# Patient Record
Sex: FEMALE | Race: WHITE | NOT HISPANIC OR LATINO
[De-identification: names, ages, dates, MRNs, and addresses within clinical notes are randomized per-mention and may not be internally consistent; named-entity substitution may affect disease eponyms.]

---

## 2018-08-16 ENCOUNTER — RESULT REVIEW (OUTPATIENT)
Age: 73
End: 2018-08-16

## 2018-08-29 ENCOUNTER — EMERGENCY (EMERGENCY)
Facility: HOSPITAL | Age: 73
LOS: 1 days | Discharge: HOME | End: 2018-08-29
Attending: EMERGENCY MEDICINE | Admitting: EMERGENCY MEDICINE
Payer: MEDICARE

## 2018-08-29 VITALS
TEMPERATURE: 98 F | SYSTOLIC BLOOD PRESSURE: 122 MMHG | RESPIRATION RATE: 18 BRPM | HEART RATE: 80 BPM | DIASTOLIC BLOOD PRESSURE: 62 MMHG | OXYGEN SATURATION: 97 %

## 2018-08-29 VITALS
WEIGHT: 149.91 LBS | HEART RATE: 97 BPM | SYSTOLIC BLOOD PRESSURE: 151 MMHG | RESPIRATION RATE: 20 BRPM | OXYGEN SATURATION: 98 % | HEIGHT: 63 IN | TEMPERATURE: 99 F | DIASTOLIC BLOOD PRESSURE: 87 MMHG

## 2018-08-29 DIAGNOSIS — F20.9 SCHIZOPHRENIA, UNSPECIFIED: ICD-10-CM

## 2018-08-29 LAB
ANION GAP SERPL CALC-SCNC: 10 MMOL/L — SIGNIFICANT CHANGE UP (ref 5–17)
APAP SERPL-MCNC: <1 UG/ML — LOW (ref 10–30)
BASOPHILS # BLD AUTO: 0.07 K/UL — SIGNIFICANT CHANGE UP (ref 0–0.2)
BASOPHILS NFR BLD AUTO: 0.7 % — SIGNIFICANT CHANGE UP (ref 0–2)
BUN SERPL-MCNC: 21 MG/DL — SIGNIFICANT CHANGE UP (ref 7–23)
CALCIUM SERPL-MCNC: 9 MG/DL — SIGNIFICANT CHANGE UP (ref 8.4–10.5)
CHLORIDE SERPL-SCNC: 104 MMOL/L — SIGNIFICANT CHANGE UP (ref 96–108)
CO2 SERPL-SCNC: 27 MMOL/L — SIGNIFICANT CHANGE UP (ref 22–31)
CREAT SERPL-MCNC: 0.99 MG/DL — SIGNIFICANT CHANGE UP (ref 0.5–1.3)
EOSINOPHIL # BLD AUTO: 0.04 K/UL — SIGNIFICANT CHANGE UP (ref 0–0.5)
EOSINOPHIL NFR BLD AUTO: 0.4 % — SIGNIFICANT CHANGE UP (ref 0–6)
ETHANOL SERPL-MCNC: <3 MG/DL — SIGNIFICANT CHANGE UP (ref 0–3)
GLUCOSE SERPL-MCNC: 104 MG/DL — HIGH (ref 70–99)
HCT VFR BLD CALC: 35.2 % — SIGNIFICANT CHANGE UP (ref 34.5–45)
HGB BLD-MCNC: 11.6 G/DL — SIGNIFICANT CHANGE UP (ref 11.5–15.5)
IMM GRANULOCYTES NFR BLD AUTO: 0.3 % — SIGNIFICANT CHANGE UP (ref 0–1.5)
LYMPHOCYTES # BLD AUTO: 3.06 K/UL — SIGNIFICANT CHANGE UP (ref 1–3.3)
LYMPHOCYTES # BLD AUTO: 31.8 % — SIGNIFICANT CHANGE UP (ref 13–44)
MCHC RBC-ENTMCNC: 30.4 PG — SIGNIFICANT CHANGE UP (ref 27–34)
MCHC RBC-ENTMCNC: 33 GM/DL — SIGNIFICANT CHANGE UP (ref 32–36)
MCV RBC AUTO: 92.4 FL — SIGNIFICANT CHANGE UP (ref 80–100)
MONOCYTES # BLD AUTO: 0.65 K/UL — SIGNIFICANT CHANGE UP (ref 0–0.9)
MONOCYTES NFR BLD AUTO: 6.8 % — SIGNIFICANT CHANGE UP (ref 2–14)
NEUTROPHILS # BLD AUTO: 5.77 K/UL — SIGNIFICANT CHANGE UP (ref 1.8–7.4)
NEUTROPHILS NFR BLD AUTO: 60 % — SIGNIFICANT CHANGE UP (ref 43–77)
PCP SPEC-MCNC: SIGNIFICANT CHANGE UP
PLATELET # BLD AUTO: 353 K/UL — SIGNIFICANT CHANGE UP (ref 150–400)
POTASSIUM SERPL-MCNC: 3.7 MMOL/L — SIGNIFICANT CHANGE UP (ref 3.5–5.3)
POTASSIUM SERPL-SCNC: 3.7 MMOL/L — SIGNIFICANT CHANGE UP (ref 3.5–5.3)
RBC # BLD: 3.81 M/UL — SIGNIFICANT CHANGE UP (ref 3.8–5.2)
RBC # FLD: 12.4 % — SIGNIFICANT CHANGE UP (ref 10.3–14.5)
SALICYLATES SERPL-MCNC: 1.2 MG/DL — LOW (ref 2.8–20)
SODIUM SERPL-SCNC: 141 MMOL/L — SIGNIFICANT CHANGE UP (ref 135–145)
WBC # BLD: 9.62 K/UL — SIGNIFICANT CHANGE UP (ref 3.8–10.5)
WBC # FLD AUTO: 9.62 K/UL — SIGNIFICANT CHANGE UP (ref 3.8–10.5)

## 2018-08-29 PROCEDURE — 99285 EMERGENCY DEPT VISIT HI MDM: CPT | Mod: 25

## 2018-08-29 PROCEDURE — 85027 COMPLETE CBC AUTOMATED: CPT

## 2018-08-29 PROCEDURE — 99285 EMERGENCY DEPT VISIT HI MDM: CPT

## 2018-08-29 PROCEDURE — 71045 X-RAY EXAM CHEST 1 VIEW: CPT | Mod: 26

## 2018-08-29 PROCEDURE — 36415 COLL VENOUS BLD VENIPUNCTURE: CPT

## 2018-08-29 PROCEDURE — 71045 X-RAY EXAM CHEST 1 VIEW: CPT

## 2018-08-29 PROCEDURE — 80307 DRUG TEST PRSMV CHEM ANLYZR: CPT

## 2018-08-29 PROCEDURE — 90792 PSYCH DIAG EVAL W/MED SRVCS: CPT | Mod: GT

## 2018-08-29 PROCEDURE — 80048 BASIC METABOLIC PNL TOTAL CA: CPT

## 2018-08-29 RX ADMIN — Medication 1 MILLIGRAM(S): at 19:45

## 2018-08-29 NOTE — ED PROVIDER NOTE - OBJECTIVE STATEMENT
72yo female who presents with denia for the last 2 weeks. pt was taken off her klonipin because her psychiatrist took her off her meds because he wanted her to sleep and over the last few days pt has been manic, unable to sleep no suicidal or homocidal thoughts, pt has no hallucinations
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
## 2018-08-29 NOTE — ED ADULT NURSE NOTE - OBJECTIVE STATEMENT
Patient brought in by family for increasing manic mood. Klonopin was discontinued 2 weeks ago by kenyon Kwok and as of about 2-3 days sister reports incheased agitation. Patient pacing in room. Cooperative

## 2018-08-29 NOTE — ED BEHAVIORAL HEALTH ASSESSMENT NOTE - DESCRIPTION
calm, cooperative recent medical admission for PNA at another facility living with sister and brother in law, states she can't afford to live alone. living there x 1 month since she just had a breakup of a relationship.  x 10 years. As per primary ED nurse, patient initially presented to ED with some restlessness and rapid speech but was able to interact appropriately with staff and family at bedside. Patient was not agitated or violent but made complaints of increased anxiety for past 2 days. Patient responded well to Ativan 1 mg po and cooperated with all labs and vitals. Patient appeared well dressed and groomed. living with sister and brother in law, states she can't afford to live alone. living there x 1 month since she just had a breakup of a relationship.  x 10 years. has three children.

## 2018-08-29 NOTE — ED ADULT NURSE NOTE - NSIMPLEMENTINTERV_GEN_ALL_ED
Implemented All Fall with Harm Risk Interventions:  Smith to call system. Call bell, personal items and telephone within reach. Instruct patient to call for assistance. Room bathroom lighting operational. Non-slip footwear when patient is off stretcher. Physically safe environment: no spills, clutter or unnecessary equipment. Stretcher in lowest position, wheels locked, appropriate side rails in place. Provide visual cue, wrist band, yellow gown, etc. Monitor gait and stability. Monitor for mental status changes and reorient to person, place, and time. Review medications for side effects contributing to fall risk. Reinforce activity limits and safety measures with patient and family. Provide visual clues: red socks.

## 2018-08-29 NOTE — ED BEHAVIORAL HEALTH ASSESSMENT NOTE - DETAILS
patient cites "allergy" but is not able to provide offending agent or nature of adverse reaction sister "manic depressive" family, psychiatrist

## 2018-08-29 NOTE — ED ADULT NURSE NOTE - HPI (INCLUDE ILLNESS QUALITY, SEVERITY, DURATION, TIMING, CONTEXT, MODIFYING FACTORS, ASSOCIATED SIGNS AND SYMPTOMS)
History of   Schizophrenia since young with multiple inpatient admissions over the years. Patient denies suicidal thoughts or homicidal ideations. No plan to hurt self or others. Daughter and sister both report patient has never verbalized suicidal thoughts or plan. No history of homicidal thoughts.

## 2018-08-29 NOTE — ED ADULT TRIAGE NOTE - CHIEF COMPLAINT QUOTE
" She had Pneumonia 2 weeks ago, ( July 17 ) She is very manic, talks a lot " Pt hx of Paranoia schizophrenia

## 2018-08-29 NOTE — ED ADULT NURSE REASSESSMENT NOTE - NS ED NURSE REASSESS COMMENT FT1
Patient no longer manic. Had interview with Tele psych. Calm, speech well paced and clear. 1:1 in place.

## 2018-08-29 NOTE — ED BEHAVIORAL HEALTH ASSESSMENT NOTE - OTHER PAST PSYCHIATRIC HISTORY (INCLUDE DETAILS REGARDING ONSET, COURSE OF ILLNESS, INPATIENT/OUTPATIENT TREATMENT)
has been hospitalized multiple times, none x 10 years. denies hx of suicide attempts. currently in outpatient tx.

## 2018-08-29 NOTE — ED BEHAVIORAL HEALTH ASSESSMENT NOTE - SUMMARY
Patient is a 72 y/o F with reported hx of schizophrenia  with prior hospitalizations, no prior suicide attempts, no known chronic medical issues, who was admitted to medicine last month for mgmt of PNA, brought in by her family because of concern over rapid speech.     Patient presents with normal speech volume, rate, and rhythm (though of note she has received one dose of bzd in ER). She denies symptoms of denia and of depression. Collateral from sister reveals main concern is rapid speech all day, but that patient is sleeping at least 10 hours per night, and has no history of dangerousness. Also patient and sister report that patient adheres to medications and to appointments and can follow up with Dr. Kwok (no currently scheduled appt but plan to call tomorrow, MD also called and left msg for Dr. Kwok).

## 2018-08-29 NOTE — ED PROVIDER NOTE - PROGRESS NOTE DETAILS
spoke with dr tate, pt is cleared to be d/c home follow up with outptpsych, return if any symtposm worsen

## 2018-09-28 PROBLEM — F32.9 MAJOR DEPRESSIVE DISORDER, SINGLE EPISODE, UNSPECIFIED: Chronic | Status: ACTIVE | Noted: 2018-08-29

## 2018-09-28 PROBLEM — F25.0 SCHIZOAFFECTIVE DISORDER, BIPOLAR TYPE: Chronic | Status: ACTIVE | Noted: 2018-08-29

## 2019-08-20 ENCOUNTER — APPOINTMENT (OUTPATIENT)
Dept: OBGYN | Facility: CLINIC | Age: 74
End: 2019-08-20
Payer: MEDICARE

## 2019-08-20 ENCOUNTER — RECORD ABSTRACTING (OUTPATIENT)
Age: 74
End: 2019-08-20

## 2019-08-20 VITALS
DIASTOLIC BLOOD PRESSURE: 70 MMHG | BODY MASS INDEX: 25.21 KG/M2 | WEIGHT: 137 LBS | SYSTOLIC BLOOD PRESSURE: 120 MMHG | HEIGHT: 62 IN

## 2019-08-20 DIAGNOSIS — Z83.3 FAMILY HISTORY OF DIABETES MELLITUS: ICD-10-CM

## 2019-08-20 DIAGNOSIS — Z12.39 ENCOUNTER FOR OTHER SCREENING FOR MALIGNANT NEOPLASM OF BREAST: ICD-10-CM

## 2019-08-20 DIAGNOSIS — Z63.4 DISAPPEARANCE AND DEATH OF FAMILY MEMBER: ICD-10-CM

## 2019-08-20 DIAGNOSIS — Z82.49 FAMILY HISTORY OF ISCHEMIC HEART DISEASE AND OTHER DISEASES OF THE CIRCULATORY SYSTEM: ICD-10-CM

## 2019-08-20 DIAGNOSIS — R35.0 FREQUENCY OF MICTURITION: ICD-10-CM

## 2019-08-20 DIAGNOSIS — Z12.11 ENCOUNTER FOR SCREENING FOR MALIGNANT NEOPLASM OF COLON: ICD-10-CM

## 2019-08-20 DIAGNOSIS — Z01.419 ENCOUNTER FOR GYNECOLOGICAL EXAMINATION (GENERAL) (ROUTINE) W/OUT ABNORMAL FINDINGS: ICD-10-CM

## 2019-08-20 DIAGNOSIS — Z72.3 LACK OF PHYSICAL EXERCISE: ICD-10-CM

## 2019-08-20 DIAGNOSIS — F20.9 SCHIZOPHRENIA, UNSPECIFIED: ICD-10-CM

## 2019-08-20 DIAGNOSIS — E78.5 HYPERLIPIDEMIA, UNSPECIFIED: ICD-10-CM

## 2019-08-20 DIAGNOSIS — F17.200 NICOTINE DEPENDENCE, UNSPECIFIED, UNCOMPLICATED: ICD-10-CM

## 2019-08-20 PROBLEM — Z00.00 ENCOUNTER FOR PREVENTIVE HEALTH EXAMINATION: Status: ACTIVE | Noted: 2019-08-20

## 2019-08-20 LAB
CYTOLOGY CVX/VAG DOC THIN PREP: NORMAL
DATE COLLECTED: NORMAL
HEMOCCULT SP1 STL QL: NEGATIVE
QUALITY CONTROL: YES

## 2019-08-20 PROCEDURE — 99397 PER PM REEVAL EST PAT 65+ YR: CPT | Mod: GY

## 2019-08-20 PROCEDURE — 82270 OCCULT BLOOD FECES: CPT

## 2019-08-20 PROCEDURE — G0101: CPT

## 2019-08-20 RX ORDER — HYDROXYZINE HYDROCHLORIDE 10 MG/5ML
10 SOLUTION ORAL
Refills: 0 | Status: DISCONTINUED | COMMUNITY
End: 2019-08-20

## 2019-08-20 RX ORDER — TRAZODONE HYDROCHLORIDE 150 MG/1
150 TABLET ORAL
Refills: 0 | Status: ACTIVE | COMMUNITY

## 2019-08-20 RX ORDER — PALIPERIDONE 9 MG/1
9 TABLET, EXTENDED RELEASE ORAL
Refills: 0 | Status: ACTIVE | COMMUNITY

## 2019-08-20 RX ORDER — DONEPEZIL HYDROCHLORIDE 10 MG/1
10 TABLET ORAL
Refills: 0 | Status: ACTIVE | COMMUNITY

## 2019-08-20 RX ORDER — CLONAZEPAM 2 MG/1
TABLET ORAL
Refills: 0 | Status: ACTIVE | COMMUNITY

## 2019-08-20 SDOH — SOCIAL STABILITY - SOCIAL INSECURITY: DISSAPEARANCE AND DEATH OF FAMILY MEMBER: Z63.4

## 2019-08-20 NOTE — PHYSICAL EXAM
[Awake] : awake [Alert] : alert [Examination Of The Breasts] : a normal appearance [No Discharge] : no discharge [No Masses] : no breast masses were palpable [Soft] : soft [Oriented x3] : oriented to person, place, and time [Labia Majora] : labia major [Labia Minora] : labia minora [Normal] : clitoris [Atrophy] : atrophy [Dry Mucosa] : dry mucosa [No Bleeding] : there was no active vaginal bleeding [Uterine Adnexae] : were not tender and not enlarged [No Tenderness] : no rectal tenderness [Nl Sphincter Tone] : normal sphincter tone [Acute Distress] : no acute distress [Mass] : no breast mass [Nipple Discharge] : no nipple discharge [Axillary LAD] : no axillary lymphadenopathy [Tender] : non tender [Depressed Mood] : not depressed [Flat Affect] : affect not flat [Occult Blood] : occult blood test from digital rectal exam was negative

## 2019-08-20 NOTE — END OF VISIT
[FreeTextEntry3] : I, Alisha Quinn, acted solely as a scribe for Dr. Sweeney on this date [8/20/19].\par \par All medical record entries made by the Scribe were at my, Dr. Sweeney's direction and personally dictated by me on [8/20/19]. I have reviewed the chart and agree that the record accurately reflects my personal performance of the history, physical exam, assessment and plan. I have also personally directed, reviewed, and agreed with the chart.\par

## 2019-08-20 NOTE — REVIEW OF SYSTEMS
[Chills] : chills [Sleep Disturbances] : sleep disturbances [Anxiety] : anxiety [Depression] : depression [Nl] : Integumentary

## 2019-08-20 NOTE — HISTORY OF PRESENT ILLNESS
[1 Year Ago] : 1 year ago [Good] : being in good health [Healthy Diet] : a healthy diet [Regular Exercise] : regular exercise [Last Bone Density ___] : Last bone density studies [unfilled] [Last Mammogram ___] : Last Mammogram was [unfilled] [Last Pap ___] : Last cervical pap smear was [unfilled] [unknown] : the patient is unsure of the date of her LMP [Menarche Age: ____] : age at menarche was [unfilled] [Postmenopausal] : is postmenopausal [FreeTextEntry8] : Vaginal Dryness [Sexually Active] : is not sexually active

## 2019-12-17 ENCOUNTER — TRANSCRIPTION ENCOUNTER (OUTPATIENT)
Age: 74
End: 2019-12-17

## 2019-12-18 ENCOUNTER — OUTPATIENT (OUTPATIENT)
Dept: OUTPATIENT SERVICES | Facility: HOSPITAL | Age: 74
LOS: 1 days | End: 2019-12-18
Payer: MEDICARE

## 2019-12-18 ENCOUNTER — RESULT REVIEW (OUTPATIENT)
Age: 74
End: 2019-12-18

## 2019-12-18 DIAGNOSIS — Z12.11 ENCOUNTER FOR SCREENING FOR MALIGNANT NEOPLASM OF COLON: ICD-10-CM

## 2019-12-18 DIAGNOSIS — D64.9 ANEMIA, UNSPECIFIED: ICD-10-CM

## 2019-12-18 PROCEDURE — G0121: CPT

## 2019-12-18 PROCEDURE — 88305 TISSUE EXAM BY PATHOLOGIST: CPT

## 2019-12-18 PROCEDURE — 88313 SPECIAL STAINS GROUP 2: CPT

## 2019-12-18 PROCEDURE — 88313 SPECIAL STAINS GROUP 2: CPT | Mod: 26

## 2019-12-18 PROCEDURE — 88312 SPECIAL STAINS GROUP 1: CPT

## 2019-12-18 PROCEDURE — 43239 EGD BIOPSY SINGLE/MULTIPLE: CPT

## 2019-12-18 PROCEDURE — 88312 SPECIAL STAINS GROUP 1: CPT | Mod: 26

## 2019-12-18 PROCEDURE — 88305 TISSUE EXAM BY PATHOLOGIST: CPT | Mod: 26

## 2019-12-20 LAB — SURGICAL PATHOLOGY STUDY: SIGNIFICANT CHANGE UP

## 2020-02-27 ENCOUNTER — EMERGENCY (EMERGENCY)
Facility: HOSPITAL | Age: 75
LOS: 1 days | Discharge: HOME | End: 2020-02-27
Attending: EMERGENCY MEDICINE | Admitting: EMERGENCY MEDICINE
Payer: MEDICARE

## 2020-02-27 VITALS
OXYGEN SATURATION: 99 % | SYSTOLIC BLOOD PRESSURE: 126 MMHG | WEIGHT: 130.07 LBS | RESPIRATION RATE: 18 BRPM | TEMPERATURE: 98 F | HEART RATE: 78 BPM | DIASTOLIC BLOOD PRESSURE: 72 MMHG | HEIGHT: 63 IN

## 2020-02-27 VITALS
TEMPERATURE: 98 F | DIASTOLIC BLOOD PRESSURE: 80 MMHG | OXYGEN SATURATION: 97 % | HEART RATE: 82 BPM | RESPIRATION RATE: 16 BRPM | SYSTOLIC BLOOD PRESSURE: 146 MMHG

## 2020-02-27 DIAGNOSIS — F25.0 SCHIZOAFFECTIVE DISORDER, BIPOLAR TYPE: ICD-10-CM

## 2020-02-27 LAB
ALBUMIN SERPL ELPH-MCNC: 3.6 G/DL — SIGNIFICANT CHANGE UP (ref 3.3–5)
ALP SERPL-CCNC: 85 U/L — SIGNIFICANT CHANGE UP (ref 30–120)
ALT FLD-CCNC: 24 U/L DA — SIGNIFICANT CHANGE UP (ref 10–60)
AMPHET UR-MCNC: NEGATIVE — SIGNIFICANT CHANGE UP
ANION GAP SERPL CALC-SCNC: 9 MMOL/L — SIGNIFICANT CHANGE UP (ref 5–17)
APAP SERPL-MCNC: <1 UG/ML — LOW (ref 10–30)
APPEARANCE UR: CLEAR — SIGNIFICANT CHANGE UP
AST SERPL-CCNC: 20 U/L — SIGNIFICANT CHANGE UP (ref 10–40)
BARBITURATES UR SCN-MCNC: NEGATIVE — SIGNIFICANT CHANGE UP
BASOPHILS # BLD AUTO: 0.04 K/UL — SIGNIFICANT CHANGE UP (ref 0–0.2)
BASOPHILS NFR BLD AUTO: 0.5 % — SIGNIFICANT CHANGE UP (ref 0–2)
BENZODIAZ UR-MCNC: NEGATIVE — SIGNIFICANT CHANGE UP
BILIRUB SERPL-MCNC: 0.2 MG/DL — SIGNIFICANT CHANGE UP (ref 0.2–1.2)
BILIRUB UR-MCNC: NEGATIVE — SIGNIFICANT CHANGE UP
BUN SERPL-MCNC: 18 MG/DL — SIGNIFICANT CHANGE UP (ref 7–23)
CALCIUM SERPL-MCNC: 9 MG/DL — SIGNIFICANT CHANGE UP (ref 8.4–10.5)
CHLORIDE SERPL-SCNC: 106 MMOL/L — SIGNIFICANT CHANGE UP (ref 96–108)
CO2 SERPL-SCNC: 28 MMOL/L — SIGNIFICANT CHANGE UP (ref 22–31)
COCAINE METAB.OTHER UR-MCNC: NEGATIVE — SIGNIFICANT CHANGE UP
COLOR SPEC: YELLOW — SIGNIFICANT CHANGE UP
CREAT SERPL-MCNC: 1.01 MG/DL — SIGNIFICANT CHANGE UP (ref 0.5–1.3)
DIFF PNL FLD: NEGATIVE — SIGNIFICANT CHANGE UP
EOSINOPHIL # BLD AUTO: 0.07 K/UL — SIGNIFICANT CHANGE UP (ref 0–0.5)
EOSINOPHIL NFR BLD AUTO: 0.9 % — SIGNIFICANT CHANGE UP (ref 0–6)
ETHANOL SERPL-MCNC: <3 MG/DL — SIGNIFICANT CHANGE UP (ref 0–3)
GLUCOSE SERPL-MCNC: 93 MG/DL — SIGNIFICANT CHANGE UP (ref 70–99)
GLUCOSE UR QL: NEGATIVE MG/DL — SIGNIFICANT CHANGE UP
HCT VFR BLD CALC: 34.1 % — LOW (ref 34.5–45)
HGB BLD-MCNC: 11.1 G/DL — LOW (ref 11.5–15.5)
IMM GRANULOCYTES NFR BLD AUTO: 0.4 % — SIGNIFICANT CHANGE UP (ref 0–1.5)
KETONES UR-MCNC: NEGATIVE — SIGNIFICANT CHANGE UP
LEUKOCYTE ESTERASE UR-ACNC: ABNORMAL
LYMPHOCYTES # BLD AUTO: 2.1 K/UL — SIGNIFICANT CHANGE UP (ref 1–3.3)
LYMPHOCYTES # BLD AUTO: 26.7 % — SIGNIFICANT CHANGE UP (ref 13–44)
MCHC RBC-ENTMCNC: 30.7 PG — SIGNIFICANT CHANGE UP (ref 27–34)
MCHC RBC-ENTMCNC: 32.6 GM/DL — SIGNIFICANT CHANGE UP (ref 32–36)
MCV RBC AUTO: 94.5 FL — SIGNIFICANT CHANGE UP (ref 80–100)
METHADONE UR-MCNC: NEGATIVE — SIGNIFICANT CHANGE UP
MONOCYTES # BLD AUTO: 0.57 K/UL — SIGNIFICANT CHANGE UP (ref 0–0.9)
MONOCYTES NFR BLD AUTO: 7.3 % — SIGNIFICANT CHANGE UP (ref 2–14)
NEUTROPHILS # BLD AUTO: 5.05 K/UL — SIGNIFICANT CHANGE UP (ref 1.8–7.4)
NEUTROPHILS NFR BLD AUTO: 64.2 % — SIGNIFICANT CHANGE UP (ref 43–77)
NITRITE UR-MCNC: NEGATIVE — SIGNIFICANT CHANGE UP
NRBC # BLD: 0 /100 WBCS — SIGNIFICANT CHANGE UP (ref 0–0)
OPIATES UR-MCNC: NEGATIVE — SIGNIFICANT CHANGE UP
PCP SPEC-MCNC: SIGNIFICANT CHANGE UP
PCP UR-MCNC: NEGATIVE — SIGNIFICANT CHANGE UP
PH UR: 8 — SIGNIFICANT CHANGE UP (ref 5–8)
PLATELET # BLD AUTO: 283 K/UL — SIGNIFICANT CHANGE UP (ref 150–400)
POTASSIUM SERPL-MCNC: 4.1 MMOL/L — SIGNIFICANT CHANGE UP (ref 3.5–5.3)
POTASSIUM SERPL-SCNC: 4.1 MMOL/L — SIGNIFICANT CHANGE UP (ref 3.5–5.3)
PROT SERPL-MCNC: 6.9 G/DL — SIGNIFICANT CHANGE UP (ref 6–8.3)
PROT UR-MCNC: NEGATIVE MG/DL — SIGNIFICANT CHANGE UP
RBC # BLD: 3.61 M/UL — LOW (ref 3.8–5.2)
RBC # FLD: 12.3 % — SIGNIFICANT CHANGE UP (ref 10.3–14.5)
SALICYLATES SERPL-MCNC: 0.7 MG/DL — LOW (ref 2.8–20)
SODIUM SERPL-SCNC: 143 MMOL/L — SIGNIFICANT CHANGE UP (ref 135–145)
SP GR SPEC: 1.01 — SIGNIFICANT CHANGE UP (ref 1.01–1.02)
THC UR QL: NEGATIVE — SIGNIFICANT CHANGE UP
UROBILINOGEN FLD QL: NEGATIVE MG/DL — SIGNIFICANT CHANGE UP
WBC # BLD: 7.86 K/UL — SIGNIFICANT CHANGE UP (ref 3.8–10.5)
WBC # FLD AUTO: 7.86 K/UL — SIGNIFICANT CHANGE UP (ref 3.8–10.5)

## 2020-02-27 PROCEDURE — 81001 URINALYSIS AUTO W/SCOPE: CPT

## 2020-02-27 PROCEDURE — 36415 COLL VENOUS BLD VENIPUNCTURE: CPT

## 2020-02-27 PROCEDURE — 99285 EMERGENCY DEPT VISIT HI MDM: CPT | Mod: 25

## 2020-02-27 PROCEDURE — 93010 ELECTROCARDIOGRAM REPORT: CPT

## 2020-02-27 PROCEDURE — 80307 DRUG TEST PRSMV CHEM ANLYZR: CPT

## 2020-02-27 PROCEDURE — 85027 COMPLETE CBC AUTOMATED: CPT

## 2020-02-27 PROCEDURE — 80053 COMPREHEN METABOLIC PANEL: CPT

## 2020-02-27 PROCEDURE — 90792 PSYCH DIAG EVAL W/MED SRVCS: CPT | Mod: GT

## 2020-02-27 PROCEDURE — 93005 ELECTROCARDIOGRAM TRACING: CPT

## 2020-02-27 PROCEDURE — 99285 EMERGENCY DEPT VISIT HI MDM: CPT

## 2020-02-27 RX ORDER — ESCITALOPRAM OXALATE 10 MG/1
1 TABLET, FILM COATED ORAL
Qty: 0 | Refills: 0 | DISCHARGE

## 2020-02-27 RX ORDER — PALIPERIDONE 1.5 MG/1
1 TABLET, EXTENDED RELEASE ORAL
Qty: 0 | Refills: 0 | DISCHARGE

## 2020-02-27 RX ORDER — BENZTROPINE MESYLATE 1 MG
1 TABLET ORAL
Qty: 0 | Refills: 0 | DISCHARGE

## 2020-02-27 RX ORDER — FERROUS SULFATE 325(65) MG
1 TABLET ORAL
Qty: 0 | Refills: 0 | DISCHARGE

## 2020-02-27 RX ORDER — HYDROXYZINE HCL 10 MG
1 TABLET ORAL
Qty: 0 | Refills: 0 | DISCHARGE

## 2020-02-27 NOTE — ED PROVIDER NOTE - NSFOLLOWUPINSTRUCTIONS_ED_ALL_ED_FT
Followup with your psychiatrist and primary care doctor. Return for any worsening condition. RETURN TO ER FOR ANY SUICIDAL THOUGHTS OR INTENT TO HARM. Continue your psych medications.

## 2020-02-27 NOTE — ED BEHAVIORAL HEALTH ASSESSMENT NOTE - CURRENT MEDICATION
patient says she doesn't know. Per medical ED note on Lexapro and Invega. Patient relays taking Invega, Klonopin, Lexapro and Trazodone. Does not recall doses

## 2020-02-27 NOTE — ED ADULT NURSE NOTE - OBJECTIVE STATEMENT
pt went to md with sister to get an Abilify injection but md not there pt states mandated by  due to non compliance also sees  for DUI arrest in past but denies drinking now lives with sister and her  and states sleeps well eating well seems a little hyper claims to have slept with sister's  in past but not now and claims sister aware denies wanting to hurt self or others
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
## 2020-02-27 NOTE — ED ADULT TRIAGE NOTE - NS_BH TRG QUESTION7_ED_ALL_ED
Depression (without Suicidality or Psychosis)/Celestina (includes Bipolar Disorder)/Anxiety (includes Panic, OCD)/Other

## 2020-02-27 NOTE — ED PROVIDER NOTE - PHYSICAL EXAMINATION
Constitutional: Awake, Alert, non-toxic. NAD. Well appearing, well nourished.   HEAD: Normocephalic, atraumatic.   EYES: EOM intact, conjunctiva and sclera are clear bilaterally. No raccoon eyes.   ENT: No rhinorrhea, mucous membranes pink/moist  NECK: Supple, non-tender; no cervical LAD, no JVD, no goiter.  CARDIOVASCULAR: Normal S1, S2; regular rate and rhythm.  RESPIRATORY: Normal respiratory effort; breath sounds CTAB, no wheezes, rhonchi, or rales. Speaking in full sentences. No accessory muscle use.   ABDOMEN: Soft; non-tender, non-distended.   EXTREMITIES: Full passive and active ROM in all extremities; non-tender to palpation; distal pulses palpable and symmetric, no edema, no crepitus or step off  SKIN: Warm, dry; good skin turgor, no apparent lesions or rashes, no ecchymosis, brisk capillary refill.  NEURO: A&O x3. Sensory and motor functions are grossly intact. Speech is normal. Appearance and judgement seem appropriate for gender and age. No neurological deficits. Neurovascular sensation intact motor function 5/5 of upper and lower extremities, CN II-XII grossly intact, intact cerebellar function. Speech clear, without articulation or word-finding difficulties. Eyes- PERRL bilaterally. EOMs in tact. No nystagmus. No facial droop. Constitutional: Awake, Alert, non-toxic. NAD. Well appearing, well nourished. (+) Dramatic, manic appearing.  HEAD: Normocephalic, atraumatic.   EYES: EOM intact, conjunctiva and sclera are clear bilaterally. No raccoon eyes.   ENT: No rhinorrhea, mucous membranes pink/moist  NECK: Supple, non-tender; no cervical LAD, no JVD, no goiter.  CARDIOVASCULAR: Normal S1, S2; regular rate and rhythm.  RESPIRATORY: Normal respiratory effort; breath sounds CTAB, no wheezes, rhonchi, or rales. Speaking in full sentences. No accessory muscle use.   ABDOMEN: Soft; non-tender, non-distended.   EXTREMITIES: Full passive and active ROM in all extremities; non-tender to palpation; distal pulses palpable and symmetric, no edema, no crepitus or step off  SKIN: Warm, dry; good skin turgor, no apparent lesions or rashes, no ecchymosis, brisk capillary refill.  NEURO: A&O x3. Sensory and motor functions are grossly intact. Speech is normal. Appearance and judgement seem appropriate for gender and age. No neurological deficits. Neurovascular sensation intact motor function 5/5 of upper and lower extremities, CN II-XII grossly intact, intact cerebellar function. Speech clear, without articulation or word-finding difficulties. Eyes- PERRL bilaterally. EOMs in tact. No nystagmus. No facial droop.
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
## 2020-02-27 NOTE — ED BEHAVIORAL HEALTH ASSESSMENT NOTE - DETAILS
non dependents patient cites "allergy" but is not able to provide offending agent or nature of adverse reaction sister "manic depressive" family, psychiatrist As above sister is "manic depressive" HTN relays being in halfway /legal issue was "traumatic" could not reach sister
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
## 2020-02-27 NOTE — ED BEHAVIORAL HEALTH ASSESSMENT NOTE - RISK ASSESSMENT
low risk (no hx of dangerousness, no hospitalization in ten years, adherent to meds, in outpatient treatment, no substance abuse) RF include reported hx of bipolar disorder and stressor of living with sister after breakup risks include prior psychiatric illness history and presentation after a triggering event with tense home environment, otherwise no hx of SA or NSSI, no hospitalization in ten years, adherent to meds, in outpatient treatment, no substance abuse). Low Acute Suicide Risk

## 2020-02-27 NOTE — ED BEHAVIORAL HEALTH ASSESSMENT NOTE - SUMMARY
Patient is a 74 y/o F with reported hx of schizophrenia  with prior hospitalizations, no prior suicide attempts, no known chronic medical issues, who was admitted to medicine last month for mgmt of PNA, brought in by her family because of concern over rapid speech.     Patient presents with normal speech volume, rate, and rhythm (though of note she has received one dose of bzd in ER). She denies symptoms of denia and of depression. Collateral from sister reveals main concern is rapid speech all day, but that patient is sleeping at least 10 hours per night, and has no history of dangerousness. Also patient and sister report that patient adheres to medications and to appointments and can follow up with Dr. Kwok (no currently scheduled appt but plan to call tomorrow, MD also called and left msg for Dr. Kwok). Patient is a 72 y/o , retired female who lives with her sister and brother in law, past psychiatric history of schizophrenia vs bipolar disorder, prior hospitalizations (last >10 years ago), no prior suicide attempts, and past medical history of hyperlipidemia who was brought in by ambulance after she initially called 911. She presents with baseline symptoms that are most consistent with schizoaffective disorder, bipolar type (prior history of schizophrenia but notably also been considered "manic depressive" and baseline is somewhat hypomanic). Assessment and collateral review does not evidence any suicidality, homicidality, denia or jose miguel psychosis. She appropriately expresses and advocates for herself, is forward thinking and desires to return home where she plans to avoid further altercations with her sister. She does not meet criteria for inpatient level of psychiatric care and is connected with a long time outpatient provider and relays compliance with outpatient medications. Her acute and chronic suicide/safety risks are low and there is no acute psychiatric contraindication to discharge.

## 2020-02-27 NOTE — ED PROVIDER NOTE - NSFOLLOWUPCLINICS_GEN_ALL_ED_FT
[facility]  Psychiatry  [address] [place] [address]  Phone: [phone]  Fax:   Follow Up Time: 1-3 Days Initial
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
## 2020-02-27 NOTE — ED PROVIDER NOTE - PATIENT PORTAL LINK FT
You can access the FollowMyHealth Patient Portal offered by [facility] by registering at the following website: [email or web address]. By joining [employer]’s FollowMyHealth portal, you will also be able to view your health information using other applications (apps) compatible with our system.

## 2020-02-27 NOTE — ED BEHAVIORAL HEALTH ASSESSMENT NOTE - SUICIDE PROTECTIVE FACTORS
Identifies reasons for living/Has future plans/Supportive social network of family or friends Identifies reasons for living/Has future plans/Supportive social network of family or friends/Positive therapeutic relationships/Responsibility to family and others
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
## 2020-02-27 NOTE — ED BEHAVIORAL HEALTH ASSESSMENT NOTE - OTHER PAST PSYCHIATRIC HISTORY (INCLUDE DETAILS REGARDING ONSET, COURSE OF ILLNESS, INPATIENT/OUTPATIENT TREATMENT)
has been hospitalized multiple times, none x 10 years. denies hx of suicide attempts. currently in outpatient tx. has been hospitalized multiple times, none x 10 years. denies hx of suicide attempts. currently in outpatient tx with Dr. Chela Kwok in [place]

## 2020-02-27 NOTE — ED BEHAVIORAL HEALTH ASSESSMENT NOTE - PRIMARY DX
Schizophrenia in partial remission with history of multiple episodes Schizoaffective disorder, bipolar type

## 2020-12-21 PROBLEM — Z01.419 ENCOUNTER FOR ANNUAL ROUTINE GYNECOLOGICAL EXAMINATION: Status: RESOLVED | Noted: 2019-08-20 | Resolved: 2020-12-21

## 2021-01-28 ENCOUNTER — EMERGENCY (EMERGENCY)
Facility: HOSPITAL | Age: 76
LOS: 1 days | Discharge: HOME | End: 2021-01-28
Attending: EMERGENCY MEDICINE | Admitting: EMERGENCY MEDICINE
Payer: SELF-PAY

## 2021-01-28 VITALS
TEMPERATURE: 98 F | HEART RATE: 113 BPM | SYSTOLIC BLOOD PRESSURE: 125 MMHG | WEIGHT: 139.99 LBS | RESPIRATION RATE: 17 BRPM | DIASTOLIC BLOOD PRESSURE: 63 MMHG | HEIGHT: 63 IN | OXYGEN SATURATION: 97 %

## 2021-01-28 VITALS
RESPIRATION RATE: 18 BRPM | OXYGEN SATURATION: 97 % | DIASTOLIC BLOOD PRESSURE: 71 MMHG | TEMPERATURE: 98 F | HEART RATE: 111 BPM | SYSTOLIC BLOOD PRESSURE: 123 MMHG

## 2021-01-28 LAB
ALBUMIN SERPL ELPH-MCNC: 3.8 G/DL — SIGNIFICANT CHANGE UP (ref 3.3–5)
ALP SERPL-CCNC: 84 U/L — SIGNIFICANT CHANGE UP (ref 40–120)
ALT FLD-CCNC: 19 U/L — SIGNIFICANT CHANGE UP (ref 10–45)
ANION GAP SERPL CALC-SCNC: 13 MMOL/L — SIGNIFICANT CHANGE UP (ref 5–17)
APPEARANCE UR: CLEAR — SIGNIFICANT CHANGE UP
APTT BLD: 32.4 SEC — SIGNIFICANT CHANGE UP (ref 27.5–35.5)
AST SERPL-CCNC: 21 U/L — SIGNIFICANT CHANGE UP (ref 10–40)
BASOPHILS # BLD AUTO: 0.01 K/UL — SIGNIFICANT CHANGE UP (ref 0–0.2)
BASOPHILS NFR BLD AUTO: 0.2 % — SIGNIFICANT CHANGE UP (ref 0–2)
BILIRUB SERPL-MCNC: 0.3 MG/DL — SIGNIFICANT CHANGE UP (ref 0.2–1.2)
BILIRUB UR-MCNC: NEGATIVE — SIGNIFICANT CHANGE UP
BUN SERPL-MCNC: 11 MG/DL — SIGNIFICANT CHANGE UP (ref 7–23)
CALCIUM SERPL-MCNC: 9.2 MG/DL — SIGNIFICANT CHANGE UP (ref 8.4–10.5)
CHLORIDE SERPL-SCNC: 100 MMOL/L — SIGNIFICANT CHANGE UP (ref 96–108)
CO2 SERPL-SCNC: 22 MMOL/L — SIGNIFICANT CHANGE UP (ref 22–31)
COLOR SPEC: YELLOW — SIGNIFICANT CHANGE UP
CREAT SERPL-MCNC: 0.92 MG/DL — SIGNIFICANT CHANGE UP (ref 0.5–1.3)
DIFF PNL FLD: NEGATIVE — SIGNIFICANT CHANGE UP
EOSINOPHIL # BLD AUTO: 0 K/UL — SIGNIFICANT CHANGE UP (ref 0–0.5)
EOSINOPHIL NFR BLD AUTO: 0 % — SIGNIFICANT CHANGE UP (ref 0–6)
FLU A RESULT: SIGNIFICANT CHANGE UP
FLU A RESULT: SIGNIFICANT CHANGE UP
FLUAV AG NPH QL: SIGNIFICANT CHANGE UP
FLUBV AG NPH QL: SIGNIFICANT CHANGE UP
GLUCOSE SERPL-MCNC: 123 MG/DL — HIGH (ref 70–99)
GLUCOSE UR QL: NEGATIVE — SIGNIFICANT CHANGE UP
HCT VFR BLD CALC: 27.5 % — LOW (ref 34.5–45)
HGB BLD-MCNC: 9.2 G/DL — LOW (ref 11.5–15.5)
IMM GRANULOCYTES NFR BLD AUTO: 0.8 % — SIGNIFICANT CHANGE UP (ref 0–1.5)
INR BLD: 0.96 RATIO — SIGNIFICANT CHANGE UP (ref 0.88–1.16)
KETONES UR-MCNC: NEGATIVE — SIGNIFICANT CHANGE UP
LACTATE SERPL-SCNC: 1.3 MMOL/L — SIGNIFICANT CHANGE UP (ref 0.7–2)
LACTATE SERPL-SCNC: 2.2 MMOL/L — HIGH (ref 0.7–2)
LEUKOCYTE ESTERASE UR-ACNC: NEGATIVE — SIGNIFICANT CHANGE UP
LYMPHOCYTES # BLD AUTO: 0.54 K/UL — LOW (ref 1–3.3)
LYMPHOCYTES # BLD AUTO: 8.2 % — LOW (ref 13–44)
MCHC RBC-ENTMCNC: 31.9 PG — SIGNIFICANT CHANGE UP (ref 27–34)
MCHC RBC-ENTMCNC: 33.5 GM/DL — SIGNIFICANT CHANGE UP (ref 32–36)
MCV RBC AUTO: 95.5 FL — SIGNIFICANT CHANGE UP (ref 80–100)
MONOCYTES # BLD AUTO: 0.59 K/UL — SIGNIFICANT CHANGE UP (ref 0–0.9)
MONOCYTES NFR BLD AUTO: 9 % — SIGNIFICANT CHANGE UP (ref 2–14)
NEUTROPHILS # BLD AUTO: 5.37 K/UL — SIGNIFICANT CHANGE UP (ref 1.8–7.4)
NEUTROPHILS NFR BLD AUTO: 81.8 % — HIGH (ref 43–77)
NITRITE UR-MCNC: NEGATIVE — SIGNIFICANT CHANGE UP
NRBC # BLD: 0 /100 WBCS — SIGNIFICANT CHANGE UP (ref 0–0)
OB PNL STL: NEGATIVE — SIGNIFICANT CHANGE UP
PH UR: 7 — SIGNIFICANT CHANGE UP (ref 5–8)
PLATELET # BLD AUTO: 192 K/UL — SIGNIFICANT CHANGE UP (ref 150–400)
POTASSIUM SERPL-MCNC: 3.7 MMOL/L — SIGNIFICANT CHANGE UP (ref 3.5–5.3)
POTASSIUM SERPL-SCNC: 3.7 MMOL/L — SIGNIFICANT CHANGE UP (ref 3.5–5.3)
PROT SERPL-MCNC: 6.9 G/DL — SIGNIFICANT CHANGE UP (ref 6–8.3)
PROT UR-MCNC: NEGATIVE — SIGNIFICANT CHANGE UP
PROTHROM AB SERPL-ACNC: 11.6 SEC — SIGNIFICANT CHANGE UP (ref 10.6–13.6)
RBC # BLD: 2.88 M/UL — LOW (ref 3.8–5.2)
RBC # FLD: 11.9 % — SIGNIFICANT CHANGE UP (ref 10.3–14.5)
RSV RESULT: SIGNIFICANT CHANGE UP
RSV RNA RESP QL NAA+PROBE: SIGNIFICANT CHANGE UP
SARS-COV-2 RNA SPEC QL NAA+PROBE: SIGNIFICANT CHANGE UP
SODIUM SERPL-SCNC: 135 MMOL/L — SIGNIFICANT CHANGE UP (ref 135–145)
SP GR SPEC: 1 — LOW (ref 1.01–1.02)
UROBILINOGEN FLD QL: NEGATIVE — SIGNIFICANT CHANGE UP
WBC # BLD: 6.56 K/UL — SIGNIFICANT CHANGE UP (ref 3.8–10.5)
WBC # FLD AUTO: 6.56 K/UL — SIGNIFICANT CHANGE UP (ref 3.8–10.5)

## 2021-01-28 PROCEDURE — U0003: CPT

## 2021-01-28 PROCEDURE — 87631 RESP VIRUS 3-5 TARGETS: CPT

## 2021-01-28 PROCEDURE — 87040 BLOOD CULTURE FOR BACTERIA: CPT

## 2021-01-28 PROCEDURE — 93005 ELECTROCARDIOGRAM TRACING: CPT

## 2021-01-28 PROCEDURE — U0005: CPT

## 2021-01-28 PROCEDURE — 80053 COMPREHEN METABOLIC PANEL: CPT

## 2021-01-28 PROCEDURE — 85730 THROMBOPLASTIN TIME PARTIAL: CPT

## 2021-01-28 PROCEDURE — 72170 X-RAY EXAM OF PELVIS: CPT

## 2021-01-28 PROCEDURE — 70450 CT HEAD/BRAIN W/O DYE: CPT

## 2021-01-28 PROCEDURE — 96365 THER/PROPH/DIAG IV INF INIT: CPT

## 2021-01-28 PROCEDURE — 85610 PROTHROMBIN TIME: CPT

## 2021-01-28 PROCEDURE — 93010 ELECTROCARDIOGRAM REPORT: CPT

## 2021-01-28 PROCEDURE — 71045 X-RAY EXAM CHEST 1 VIEW: CPT | Mod: 26

## 2021-01-28 PROCEDURE — 36415 COLL VENOUS BLD VENIPUNCTURE: CPT

## 2021-01-28 PROCEDURE — 71045 X-RAY EXAM CHEST 1 VIEW: CPT

## 2021-01-28 PROCEDURE — 99285 EMERGENCY DEPT VISIT HI MDM: CPT

## 2021-01-28 PROCEDURE — 87086 URINE CULTURE/COLONY COUNT: CPT

## 2021-01-28 PROCEDURE — 82272 OCCULT BLD FECES 1-3 TESTS: CPT

## 2021-01-28 PROCEDURE — 83605 ASSAY OF LACTIC ACID: CPT

## 2021-01-28 PROCEDURE — 72170 X-RAY EXAM OF PELVIS: CPT | Mod: 26

## 2021-01-28 PROCEDURE — 99284 EMERGENCY DEPT VISIT MOD MDM: CPT | Mod: 25

## 2021-01-28 PROCEDURE — 70450 CT HEAD/BRAIN W/O DYE: CPT | Mod: 26,MA

## 2021-01-28 PROCEDURE — 85025 COMPLETE CBC W/AUTO DIFF WBC: CPT

## 2021-01-28 RX ORDER — SODIUM CHLORIDE 9 MG/ML
1950 INJECTION INTRAMUSCULAR; INTRAVENOUS; SUBCUTANEOUS ONCE
Refills: 0 | Status: COMPLETED | OUTPATIENT
Start: 2021-01-28 | End: 2021-01-28

## 2021-01-28 RX ORDER — CEFTRIAXONE 500 MG/1
1000 INJECTION, POWDER, FOR SOLUTION INTRAMUSCULAR; INTRAVENOUS ONCE
Refills: 0 | Status: COMPLETED | OUTPATIENT
Start: 2021-01-28 | End: 2021-01-28

## 2021-01-28 RX ORDER — IBUPROFEN 200 MG
400 TABLET ORAL ONCE
Refills: 0 | Status: COMPLETED | OUTPATIENT
Start: 2021-01-28 | End: 2021-01-28

## 2021-01-28 RX ORDER — CEPHALEXIN 500 MG
1 CAPSULE ORAL
Qty: 14 | Refills: 0
Start: 2021-01-28 | End: 2021-02-03

## 2021-01-28 RX ORDER — ACETAMINOPHEN 500 MG
650 TABLET ORAL ONCE
Refills: 0 | Status: COMPLETED | OUTPATIENT
Start: 2021-01-28 | End: 2021-01-28

## 2021-01-28 RX ADMIN — Medication 650 MILLIGRAM(S): at 10:34

## 2021-01-28 RX ADMIN — SODIUM CHLORIDE 1950 MILLILITER(S): 9 INJECTION INTRAMUSCULAR; INTRAVENOUS; SUBCUTANEOUS at 11:30

## 2021-01-28 RX ADMIN — Medication 400 MILLIGRAM(S): at 12:27

## 2021-01-28 RX ADMIN — SODIUM CHLORIDE 1950 MILLILITER(S): 9 INJECTION INTRAMUSCULAR; INTRAVENOUS; SUBCUTANEOUS at 10:22

## 2021-01-28 RX ADMIN — CEFTRIAXONE 100 MILLIGRAM(S): 500 INJECTION, POWDER, FOR SOLUTION INTRAMUSCULAR; INTRAVENOUS at 10:35

## 2021-01-28 RX ADMIN — CEFTRIAXONE 1000 MILLIGRAM(S): 500 INJECTION, POWDER, FOR SOLUTION INTRAMUSCULAR; INTRAVENOUS at 11:05
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
## 2021-01-28 NOTE — ED PROVIDER NOTE - CARE PLAN
Principal Discharge DX:	Acute cystitis without hematuria   Principal Discharge DX:	Acute cystitis without hematuria  Secondary Diagnosis:	Anemia, unspecified type
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
## 2021-01-28 NOTE — ED PROVIDER NOTE - ATTENDING CONTRIBUTION TO CARE
Dr. Daniel: I performed a face to face bedside interview with patient regarding history of present illness, review of symptoms and past medical history. I completed an independent physical exam.  I have discussed patient's plan of care with PA.   I agree with note as stated above, having amended the EMR as needed to reflect my findings.   This includes HISTORY OF PRESENT ILLNESS, HIV, PAST MEDICAL/SURGICAL/FAMILY/SOCIAL HISTORY, ALLERGIES AND HOME MEDICATIONS, REVIEW OF SYSTEMS, PHYSICAL EXAM, and any PROGRESS NOTES during the time I functioned as the attending physician for this patient.    Dr. Daniel: This H&P has been written by myself in its entirety

## 2021-01-28 NOTE — ED PROVIDER NOTE - MUSCULOSKELETAL, MLM
Spine appears normal, range of motion is not limited, no muscle or joint tenderness. Pelvis stable, no pain with ROM of bilateral hips

## 2021-01-28 NOTE — ED PROVIDER NOTE - PATIENT PORTAL LINK FT
You can access the FollowMyHealth Patient Portal offered by [facility] by registering at the following website: [email or web address]. By joining [facility]’s FollowMyHealth portal, you will also be able to view your health information using other applications (apps) compatible with our system.
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
## 2021-01-28 NOTE — ED PROVIDER NOTE - NSFOLLOWUPINSTRUCTIONS_ED_ALL_ED_FT
1. Keflex twice a day for 5 days  2. Your blood counts are low. Please follow up with your doctor about this  3. Return to the ED for worsening symptoms, fevers, chills or any concerns  ***    Urinary Tract Infection    A urinary tract infection (UTI) is an infection of any part of the urinary tract, which includes the kidneys, ureters, bladder, and urethra. Risk factors include ignoring your need to urinate, wiping back to front if female, being an uncircumcised male, and having diabetes or a weak immune system. Symptoms include frequent urination, pain or burning with urination, foul smelling urine, cloudy urine, pain in the lower abdomen, blood in the urine, and fever. If you were prescribed an antibiotic medicine, take it as told by your health care provider. Do not stop taking the antibiotic even if you start to feel better.    SEEK IMMEDIATE MEDICAL CARE IF YOU HAVE ANY OF THE FOLLOWING SYMPTOMS: severe back or abdominal pain, fever, inability to keep fluids or medicine down, dizziness/lightheadedness, or a change in mental status.  ***    Anemia    WHAT YOU NEED TO KNOW:    Anemia is a low number of red blood cells or a low amount of hemoglobin in your red blood cells. Hemoglobin is a protein that helps carry oxygen throughout your body. Red blood cells use iron to create hemoglobin. Anemia may develop if your body does not have enough iron. It may also develop if your body does not make enough red blood cells or they die faster than your body can make them.     DISCHARGE INSTRUCTIONS:    Call 911 or have someone call 911 for any of the following:   •You lose consciousness.      •You have severe chest pain.      Seek care immediately if:   •You have dark or bloody bowel movements.          Contact your healthcare provider if:   •Your symptoms are worse, even after treatment.      •You have questions or concerns about your condition or care.      Medicines:   •Iron or folic acid supplements help increase your red blood cell and hemoglobin levels.       •Vitamin B12 injections may help boost your red blood cell level and decrease your symptoms. Ask your healthcare provider how to inject B12.      •Take your medicine as directed. Contact your healthcare provider if you think your medicine is not helping or if you have side effects. Tell him of her if you are allergic to any medicine. Keep a list of the medicines, vitamins, and herbs you take. Include the amounts, and when and why you take them. Bring the list or the pill bottles to follow-up visits. Carry your medicine list with you in case of an emergency.      Prevent anemia: Eat healthy foods rich in iron and vitamin C. Nuts, meat, dark leafy green vegetables, and beans are high in iron and protein. Vitamin C helps your body absorb iron. Foods rich in vitamin C include oranges and other citrus fruits. Ask your healthcare provider for a list of other foods that are high in iron or vitamin C. Ask if you need to be on a special diet.     Follow up with your healthcare provider as directed: Write down your questions so you remember to ask them during your visits. 1. Keflex twice a day for 7 days  2. Your blood counts are low. Please follow up with your doctor about this  3. Return to the ED for worsening symptoms, fevers, chills or any concerns  ***    Urinary Tract Infection    A urinary tract infection (UTI) is an infection of any part of the urinary tract, which includes the kidneys, ureters, bladder, and urethra. Risk factors include ignoring your need to urinate, wiping back to front if female, being an uncircumcised male, and having diabetes or a weak immune system. Symptoms include frequent urination, pain or burning with urination, foul smelling urine, cloudy urine, pain in the lower abdomen, blood in the urine, and fever. If you were prescribed an antibiotic medicine, take it as told by your health care provider. Do not stop taking the antibiotic even if you start to feel better.    SEEK IMMEDIATE MEDICAL CARE IF YOU HAVE ANY OF THE FOLLOWING SYMPTOMS: severe back or abdominal pain, fever, inability to keep fluids or medicine down, dizziness/lightheadedness, or a change in mental status.  ***    Anemia    WHAT YOU NEED TO KNOW:    Anemia is a low number of red blood cells or a low amount of hemoglobin in your red blood cells. Hemoglobin is a protein that helps carry oxygen throughout your body. Red blood cells use iron to create hemoglobin. Anemia may develop if your body does not have enough iron. It may also develop if your body does not make enough red blood cells or they die faster than your body can make them.     DISCHARGE INSTRUCTIONS:    Call 911 or have someone call 911 for any of the following:   •You lose consciousness.      •You have severe chest pain.      Seek care immediately if:   •You have dark or bloody bowel movements.          Contact your healthcare provider if:   •Your symptoms are worse, even after treatment.      •You have questions or concerns about your condition or care.      Medicines:   •Iron or folic acid supplements help increase your red blood cell and hemoglobin levels.       •Vitamin B12 injections may help boost your red blood cell level and decrease your symptoms. Ask your healthcare provider how to inject B12.      •Take your medicine as directed. Contact your healthcare provider if you think your medicine is not helping or if you have side effects. Tell him of her if you are allergic to any medicine. Keep a list of the medicines, vitamins, and herbs you take. Include the amounts, and when and why you take them. Bring the list or the pill bottles to follow-up visits. Carry your medicine list with you in case of an emergency.      Prevent anemia: Eat healthy foods rich in iron and vitamin C. Nuts, meat, dark leafy green vegetables, and beans are high in iron and protein. Vitamin C helps your body absorb iron. Foods rich in vitamin C include oranges and other citrus fruits. Ask your healthcare provider for a list of other foods that are high in iron or vitamin C. Ask if you need to be on a special diet.     Follow up with your healthcare provider as directed: Write down your questions so you remember to ask them during your visits.

## 2021-01-29 LAB
CULTURE RESULTS: SIGNIFICANT CHANGE UP
SPECIMEN SOURCE: SIGNIFICANT CHANGE UP

## 2021-02-02 LAB
CULTURE RESULTS: SIGNIFICANT CHANGE UP
CULTURE RESULTS: SIGNIFICANT CHANGE UP
SPECIMEN SOURCE: SIGNIFICANT CHANGE UP
SPECIMEN SOURCE: SIGNIFICANT CHANGE UP

## 2021-05-05 ENCOUNTER — EMERGENCY (EMERGENCY)
Facility: HOSPITAL | Age: 76
LOS: 1 days | Discharge: HOME | End: 2021-05-05
Attending: EMERGENCY MEDICINE | Admitting: EMERGENCY MEDICINE
Payer: MEDICARE

## 2021-05-05 VITALS
HEART RATE: 109 BPM | DIASTOLIC BLOOD PRESSURE: 86 MMHG | SYSTOLIC BLOOD PRESSURE: 132 MMHG | HEIGHT: 63 IN | WEIGHT: 136.03 LBS | OXYGEN SATURATION: 98 % | RESPIRATION RATE: 18 BRPM | TEMPERATURE: 98 F

## 2021-05-05 VITALS
DIASTOLIC BLOOD PRESSURE: 78 MMHG | OXYGEN SATURATION: 98 % | RESPIRATION RATE: 16 BRPM | HEART RATE: 96 BPM | SYSTOLIC BLOOD PRESSURE: 117 MMHG

## 2021-05-05 LAB
ALBUMIN SERPL ELPH-MCNC: 4.1 G/DL — SIGNIFICANT CHANGE UP (ref 3.3–5)
ALP SERPL-CCNC: 93 U/L — SIGNIFICANT CHANGE UP (ref 40–120)
ALT FLD-CCNC: 19 U/L — SIGNIFICANT CHANGE UP (ref 10–45)
ANION GAP SERPL CALC-SCNC: 12 MMOL/L — SIGNIFICANT CHANGE UP (ref 5–17)
APPEARANCE UR: CLEAR — SIGNIFICANT CHANGE UP
AST SERPL-CCNC: 24 U/L — SIGNIFICANT CHANGE UP (ref 10–40)
BACTERIA # UR AUTO: ABNORMAL /HPF
BASOPHILS # BLD AUTO: 0.03 K/UL — SIGNIFICANT CHANGE UP (ref 0–0.2)
BASOPHILS NFR BLD AUTO: 0.4 % — SIGNIFICANT CHANGE UP (ref 0–2)
BILIRUB SERPL-MCNC: 0.3 MG/DL — SIGNIFICANT CHANGE UP (ref 0.2–1.2)
BILIRUB UR-MCNC: NEGATIVE — SIGNIFICANT CHANGE UP
BUN SERPL-MCNC: 14 MG/DL — SIGNIFICANT CHANGE UP (ref 7–23)
CALCIUM SERPL-MCNC: 9.6 MG/DL — SIGNIFICANT CHANGE UP (ref 8.4–10.5)
CHLORIDE SERPL-SCNC: 103 MMOL/L — SIGNIFICANT CHANGE UP (ref 96–108)
CO2 SERPL-SCNC: 25 MMOL/L — SIGNIFICANT CHANGE UP (ref 22–31)
COLOR SPEC: YELLOW — SIGNIFICANT CHANGE UP
CREAT SERPL-MCNC: 0.84 MG/DL — SIGNIFICANT CHANGE UP (ref 0.5–1.3)
DIFF PNL FLD: NEGATIVE — SIGNIFICANT CHANGE UP
EOSINOPHIL # BLD AUTO: 0.01 K/UL — SIGNIFICANT CHANGE UP (ref 0–0.5)
EOSINOPHIL NFR BLD AUTO: 0.1 % — SIGNIFICANT CHANGE UP (ref 0–6)
EPI CELLS # UR: SIGNIFICANT CHANGE UP
GLUCOSE SERPL-MCNC: 116 MG/DL — HIGH (ref 70–99)
GLUCOSE UR QL: NEGATIVE — SIGNIFICANT CHANGE UP
HCT VFR BLD CALC: 34.5 % — SIGNIFICANT CHANGE UP (ref 34.5–45)
HGB BLD-MCNC: 11.5 G/DL — SIGNIFICANT CHANGE UP (ref 11.5–15.5)
IMM GRANULOCYTES NFR BLD AUTO: 0.3 % — SIGNIFICANT CHANGE UP (ref 0–1.5)
KETONES UR-MCNC: NEGATIVE — SIGNIFICANT CHANGE UP
LEUKOCYTE ESTERASE UR-ACNC: ABNORMAL
LIDOCAIN IGE QN: 192 U/L — SIGNIFICANT CHANGE UP (ref 73–393)
LYMPHOCYTES # BLD AUTO: 1.97 K/UL — SIGNIFICANT CHANGE UP (ref 1–3.3)
LYMPHOCYTES # BLD AUTO: 26.5 % — SIGNIFICANT CHANGE UP (ref 13–44)
MCHC RBC-ENTMCNC: 32.1 PG — SIGNIFICANT CHANGE UP (ref 27–34)
MCHC RBC-ENTMCNC: 33.3 GM/DL — SIGNIFICANT CHANGE UP (ref 32–36)
MCV RBC AUTO: 96.4 FL — SIGNIFICANT CHANGE UP (ref 80–100)
MONOCYTES # BLD AUTO: 0.62 K/UL — SIGNIFICANT CHANGE UP (ref 0–0.9)
MONOCYTES NFR BLD AUTO: 8.4 % — SIGNIFICANT CHANGE UP (ref 2–14)
NEUTROPHILS # BLD AUTO: 4.77 K/UL — SIGNIFICANT CHANGE UP (ref 1.8–7.4)
NEUTROPHILS NFR BLD AUTO: 64.3 % — SIGNIFICANT CHANGE UP (ref 43–77)
NITRITE UR-MCNC: NEGATIVE — SIGNIFICANT CHANGE UP
NRBC # BLD: 0 /100 WBCS — SIGNIFICANT CHANGE UP (ref 0–0)
PH UR: 6 — SIGNIFICANT CHANGE UP (ref 5–8)
PLATELET # BLD AUTO: 338 K/UL — SIGNIFICANT CHANGE UP (ref 150–400)
POTASSIUM SERPL-MCNC: 4 MMOL/L — SIGNIFICANT CHANGE UP (ref 3.5–5.3)
POTASSIUM SERPL-SCNC: 4 MMOL/L — SIGNIFICANT CHANGE UP (ref 3.5–5.3)
PROT SERPL-MCNC: 7.7 G/DL — SIGNIFICANT CHANGE UP (ref 6–8.3)
PROT UR-MCNC: 15
RBC # BLD: 3.58 M/UL — LOW (ref 3.8–5.2)
RBC # FLD: 12.4 % — SIGNIFICANT CHANGE UP (ref 10.3–14.5)
RBC CASTS # UR COMP ASSIST: NEGATIVE /HPF — SIGNIFICANT CHANGE UP (ref 0–4)
SARS-COV-2 RNA SPEC QL NAA+PROBE: SIGNIFICANT CHANGE UP
SODIUM SERPL-SCNC: 140 MMOL/L — SIGNIFICANT CHANGE UP (ref 135–145)
SP GR SPEC: 1.02 — SIGNIFICANT CHANGE UP (ref 1.01–1.02)
UROBILINOGEN FLD QL: NEGATIVE — SIGNIFICANT CHANGE UP
WBC # BLD: 7.42 K/UL — SIGNIFICANT CHANGE UP (ref 3.8–10.5)
WBC # FLD AUTO: 7.42 K/UL — SIGNIFICANT CHANGE UP (ref 3.8–10.5)
WBC UR QL: SIGNIFICANT CHANGE UP /HPF (ref 0–5)

## 2021-05-05 PROCEDURE — 83690 ASSAY OF LIPASE: CPT

## 2021-05-05 PROCEDURE — 87635 SARS-COV-2 COVID-19 AMP PRB: CPT

## 2021-05-05 PROCEDURE — 81001 URINALYSIS AUTO W/SCOPE: CPT

## 2021-05-05 PROCEDURE — 36415 COLL VENOUS BLD VENIPUNCTURE: CPT

## 2021-05-05 PROCEDURE — 80053 COMPREHEN METABOLIC PANEL: CPT

## 2021-05-05 PROCEDURE — 85025 COMPLETE CBC W/AUTO DIFF WBC: CPT

## 2021-05-05 PROCEDURE — 99283 EMERGENCY DEPT VISIT LOW MDM: CPT

## 2021-05-05 PROCEDURE — 99284 EMERGENCY DEPT VISIT MOD MDM: CPT

## 2021-05-05 RX ORDER — QUETIAPINE FUMARATE 200 MG/1
200 TABLET, FILM COATED ORAL ONCE
Refills: 0 | Status: COMPLETED | OUTPATIENT
Start: 2021-05-05 | End: 2021-05-05

## 2021-05-05 RX ORDER — DIVALPROEX SODIUM 500 MG/1
500 TABLET, DELAYED RELEASE ORAL ONCE
Refills: 0 | Status: COMPLETED | OUTPATIENT
Start: 2021-05-05 | End: 2021-05-05

## 2021-05-05 RX ORDER — TRAZODONE HCL 50 MG
100 TABLET ORAL ONCE
Refills: 0 | Status: COMPLETED | OUTPATIENT
Start: 2021-05-05 | End: 2021-05-05

## 2021-05-05 RX ORDER — CLONAZEPAM 1 MG
0.5 TABLET ORAL ONCE
Refills: 0 | Status: DISCONTINUED | OUTPATIENT
Start: 2021-05-05 | End: 2021-05-05

## 2021-05-05 RX ORDER — IBUPROFEN 200 MG
400 TABLET ORAL ONCE
Refills: 0 | Status: COMPLETED | OUTPATIENT
Start: 2021-05-05 | End: 2021-05-05

## 2021-05-05 RX ADMIN — QUETIAPINE FUMARATE 200 MILLIGRAM(S): 200 TABLET, FILM COATED ORAL at 21:03

## 2021-05-05 RX ADMIN — Medication 0.5 MILLIGRAM(S): at 21:03

## 2021-05-05 RX ADMIN — Medication 100 MILLIGRAM(S): at 21:03

## 2021-05-05 RX ADMIN — Medication 400 MILLIGRAM(S): at 21:03

## 2021-05-05 RX ADMIN — DIVALPROEX SODIUM 500 MILLIGRAM(S): 500 TABLET, DELAYED RELEASE ORAL at 21:04

## 2021-05-05 NOTE — ED PROVIDER NOTE - OBJECTIVE STATEMENT
75 yo female, hx bipolar disorder comes from [facility] for evaluation for anxiety.   Pt states she didn't sleep well last night so as the day went on she was feeling more anxious over the situation so she mentioned it to her nurse who sent her to the ED for evaluation.    At this time denies feeling anxious. Admits to mentioning to EMS that she had a little headache last night  and stomach ache today with a little frequency on urination, which have all improved.   Denies any fevers, chills, headaches, dizziness, n/v .   or any other complaints.    Denies any suicidal thoughts, thoughts of harming herself or anyone else.

## 2021-05-05 NOTE — ED ADULT NURSE NOTE - OBJECTIVE STATEMENT
76 yr old female ambulatory to ED A&Ox4 presents with inability to sleep, frequent urination, lower abd pain, and diarrhea.  Pt states that she hasn't been able to sleep since Tuesday.  Also reports feeling anxious.  Pt denies any fevers or chills. No SOB. No n/v.  No acute resp distress noted. Resp even and unlabored. Abd soft, NT, ND. +BS. +PP. PUTNAM. Skin warm, dry and intact.  20 G IV placed to LT AC. Bloods obtained and sent.  Safety maintained.

## 2021-05-05 NOTE — ED PROVIDER NOTE - PATIENT PORTAL LINK FT
You can access the FollowMyHealth Patient Portal offered by [facility] by registering at the following website: [email or web address]. By joining [facility]’s FollowMyHealth portal, you will also be able to view your health information using other applications (apps) compatible with our system.

## 2021-05-05 NOTE — ED PROVIDER NOTE - ATTENDING CONTRIBUTION TO CARE
pt from assisted living c/o anxiety, moderate, today. thinks it's due to not sleeping well last night. no si/hi/hallucinations.  has mild headache today with occasional stomachache. no vomiting/diarrhea. no fever/chills.     exam:   General: well appearing, NAD.   HEENT: eyes perrl, nose normal, OP no erythema/exudate/swelling.   cor: RRR, s1s2, 2+rad pulses.   lungs: ctabl, no resp distress.   abd: soft, ntnd.   neuro: a&ox3, cn2-12 intact, PUTNAM, 5/5 strength c nl sensation all extremities, nl coordination.   MSK: no extremity swelling.  Skin: normal, no rash  psych: coherent, no si/hi/hallucinations. calm.     AP: 77 yo F here for anxiety and headache, abd pain.  well appearing . no si/hi/hallucinations. no acute psychosis. abd nontender. normal neuro exam.  will check labs, covid19 pcr. will give her usual evening psych / anxiety meds. reassess

## 2021-05-05 NOTE — ED PROVIDER NOTE - NSFOLLOWUPINSTRUCTIONS_ED_ALL_ED_FT
Follow up with your primary physician  for a post hospital visit within 48 hours,  taking all results form the ER to be reviewed. if any recurrent symptoms., any worsening, concerning or new signs or symptoms return to the ER

## 2021-05-05 NOTE — ED ADULT TRIAGE NOTE - CHIEF COMPLAINT QUOTE
BIBA from [facility], EMS reports Psych eval however pt reports nausea,  and diarrhea for 2 days. I have been taking a lot of medications for constipation. ISAR positive

## 2021-05-05 NOTE — ED ADULT NURSE REASSESSMENT NOTE - NS ED NURSE REASSESS COMMENT FT1
Pt to be discharged, awaiting ambulate back to [place].  A&Ox4. No acute distress noted.  Safety  maintained.

## 2021-05-05 NOTE — ED PROVIDER NOTE - CLINICAL SUMMARY MEDICAL DECISION MAKING FREE TEXT BOX
75 yo female, hx bipolar disorder comes from [facility] for evaluation for anxiety.   Pt states she didn't sleep well last night so as the day went on she was feeling more anxious over the situation so she mentioned it to her nurse who sent her to the ED for evaluation.    At this time denies feeling anxious. Admits to mentioning to EMS that she had a little headache last night  and stomach ache today with a little frequency on urination, which have all improved.   Denies any fevers, chills, headaches, dizziness, n/v .   or any other complaints.    Denies any suicidal thoughts, thoughts of harming herself or anyone else.  exam wnl, check labs, urine and re-eval 75 yo female, hx bipolar disorder comes from [facility] for evaluation for anxiety.   Pt states she didn't sleep well last night so as the day went on she was feeling more anxious over the situation so she mentioned it to her nurse who sent her to the ED for evaluation.    At this time denies feeling anxious. Admits to mentioning to EMS that she had a little headache last night  and stomach ache today with a little frequency on urination, which have all improved.   Denies any fevers, chills, headaches, dizziness, n/v .   or any other complaints.    Denies any suicidal thoughts, thoughts of harming herself or anyone else.  exam wnl, check labs, urine and re-eval    2200: pt well appearing. no headache or abd pain.  calm. no si/hi/hallucinations. labs unremarkable. covid pcr neg.  will dc to assisted living

## 2021-08-30 NOTE — ED BEHAVIORAL HEALTH ASSESSMENT NOTE - NS ED BHA TELEPSYCH PATIENT LOCATION
Detail Level: Simple
Additional Notes: Patient consent was obtained to proceed with the visit and recommended plan of care after discussion of all risks and benefits, including the risks of COVID-19 exposure.
[place]
all other ROS negative except as per HPI

## 2023-01-20 ENCOUNTER — INPATIENT (INPATIENT)
Facility: HOSPITAL | Age: 78
LOS: 6 days | Discharge: HOME | DRG: 177 | End: 2023-01-27
Attending: INTERNAL MEDICINE | Admitting: INTERNAL MEDICINE
Payer: MEDICARE

## 2023-01-20 VITALS
SYSTOLIC BLOOD PRESSURE: 132 MMHG | RESPIRATION RATE: 18 BRPM | TEMPERATURE: 98 F | OXYGEN SATURATION: 95 % | WEIGHT: 139.99 LBS | DIASTOLIC BLOOD PRESSURE: 81 MMHG | HEART RATE: 110 BPM | HEIGHT: 63 IN

## 2023-01-20 LAB
ALBUMIN SERPL ELPH-MCNC: 3.7 G/DL — SIGNIFICANT CHANGE UP (ref 3.3–5)
ALP SERPL-CCNC: 88 U/L — SIGNIFICANT CHANGE UP (ref 40–120)
ALT FLD-CCNC: 14 U/L — SIGNIFICANT CHANGE UP (ref 10–45)
ANION GAP SERPL CALC-SCNC: 9 MMOL/L — SIGNIFICANT CHANGE UP (ref 5–17)
APPEARANCE UR: CLEAR — SIGNIFICANT CHANGE UP
AST SERPL-CCNC: 17 U/L — SIGNIFICANT CHANGE UP (ref 10–40)
BASOPHILS # BLD AUTO: 0.03 K/UL — SIGNIFICANT CHANGE UP (ref 0–0.2)
BASOPHILS NFR BLD AUTO: 0.4 % — SIGNIFICANT CHANGE UP (ref 0–2)
BILIRUB SERPL-MCNC: 0.3 MG/DL — SIGNIFICANT CHANGE UP (ref 0.2–1.2)
BILIRUB UR-MCNC: NEGATIVE — SIGNIFICANT CHANGE UP
BUN SERPL-MCNC: 14 MG/DL — SIGNIFICANT CHANGE UP (ref 7–23)
CALCIUM SERPL-MCNC: 9.2 MG/DL — SIGNIFICANT CHANGE UP (ref 8.4–10.5)
CHLORIDE SERPL-SCNC: 101 MMOL/L — SIGNIFICANT CHANGE UP (ref 96–108)
CO2 SERPL-SCNC: 27 MMOL/L — SIGNIFICANT CHANGE UP (ref 22–31)
COLOR SPEC: YELLOW — SIGNIFICANT CHANGE UP
CREAT SERPL-MCNC: 0.94 MG/DL — SIGNIFICANT CHANGE UP (ref 0.5–1.3)
DIFF PNL FLD: NEGATIVE — SIGNIFICANT CHANGE UP
EGFR: 63 ML/MIN/1.73M2 — SIGNIFICANT CHANGE UP
EOSINOPHIL # BLD AUTO: 0.01 K/UL — SIGNIFICANT CHANGE UP (ref 0–0.5)
EOSINOPHIL NFR BLD AUTO: 0.1 % — SIGNIFICANT CHANGE UP (ref 0–6)
GLUCOSE SERPL-MCNC: 112 MG/DL — HIGH (ref 70–99)
GLUCOSE UR QL: NEGATIVE — SIGNIFICANT CHANGE UP
HCT VFR BLD CALC: 34.6 % — SIGNIFICANT CHANGE UP (ref 34.5–45)
HGB BLD-MCNC: 11.6 G/DL — SIGNIFICANT CHANGE UP (ref 11.5–15.5)
IMM GRANULOCYTES NFR BLD AUTO: 0.4 % — SIGNIFICANT CHANGE UP (ref 0–0.9)
KETONES UR-MCNC: NEGATIVE — SIGNIFICANT CHANGE UP
LEUKOCYTE ESTERASE UR-ACNC: NEGATIVE — SIGNIFICANT CHANGE UP
LYMPHOCYTES # BLD AUTO: 1.37 K/UL — SIGNIFICANT CHANGE UP (ref 1–3.3)
LYMPHOCYTES # BLD AUTO: 18 % — SIGNIFICANT CHANGE UP (ref 13–44)
MCHC RBC-ENTMCNC: 31.2 PG — SIGNIFICANT CHANGE UP (ref 27–34)
MCHC RBC-ENTMCNC: 33.5 GM/DL — SIGNIFICANT CHANGE UP (ref 32–36)
MCV RBC AUTO: 93 FL — SIGNIFICANT CHANGE UP (ref 80–100)
MONOCYTES # BLD AUTO: 0.91 K/UL — HIGH (ref 0–0.9)
MONOCYTES NFR BLD AUTO: 12 % — SIGNIFICANT CHANGE UP (ref 2–14)
NEUTROPHILS # BLD AUTO: 5.26 K/UL — SIGNIFICANT CHANGE UP (ref 1.8–7.4)
NEUTROPHILS NFR BLD AUTO: 69.1 % — SIGNIFICANT CHANGE UP (ref 43–77)
NITRITE UR-MCNC: NEGATIVE — SIGNIFICANT CHANGE UP
NRBC # BLD: 0 /100 WBCS — SIGNIFICANT CHANGE UP (ref 0–0)
PH UR: 8 — SIGNIFICANT CHANGE UP (ref 5–8)
PLATELET # BLD AUTO: 226 K/UL — SIGNIFICANT CHANGE UP (ref 150–400)
POTASSIUM SERPL-MCNC: 4.2 MMOL/L — SIGNIFICANT CHANGE UP (ref 3.5–5.3)
POTASSIUM SERPL-SCNC: 4.2 MMOL/L — SIGNIFICANT CHANGE UP (ref 3.5–5.3)
PROT SERPL-MCNC: 6.9 G/DL — SIGNIFICANT CHANGE UP (ref 6–8.3)
PROT UR-MCNC: NEGATIVE — SIGNIFICANT CHANGE UP
RBC # BLD: 3.72 M/UL — LOW (ref 3.8–5.2)
RBC # FLD: 13.2 % — SIGNIFICANT CHANGE UP (ref 10.3–14.5)
SARS-COV-2 RNA SPEC QL NAA+PROBE: DETECTED
SODIUM SERPL-SCNC: 137 MMOL/L — SIGNIFICANT CHANGE UP (ref 135–145)
SP GR SPEC: 1.01 — SIGNIFICANT CHANGE UP (ref 1.01–1.02)
UROBILINOGEN FLD QL: NEGATIVE — SIGNIFICANT CHANGE UP
WBC # BLD: 7.61 K/UL — SIGNIFICANT CHANGE UP (ref 3.8–10.5)
WBC # FLD AUTO: 7.61 K/UL — SIGNIFICANT CHANGE UP (ref 3.8–10.5)

## 2023-01-20 PROCEDURE — 99285 EMERGENCY DEPT VISIT HI MDM: CPT

## 2023-01-20 PROCEDURE — 71045 X-RAY EXAM CHEST 1 VIEW: CPT | Mod: 26

## 2023-01-20 PROCEDURE — 70450 CT HEAD/BRAIN W/O DYE: CPT | Mod: 26,MA

## 2023-01-20 RX ORDER — SODIUM CHLORIDE 9 MG/ML
1000 INJECTION INTRAMUSCULAR; INTRAVENOUS; SUBCUTANEOUS ONCE
Refills: 0 | Status: COMPLETED | OUTPATIENT
Start: 2023-01-20 | End: 2023-01-20

## 2023-01-20 RX ADMIN — SODIUM CHLORIDE 1000 MILLILITER(S): 9 INJECTION INTRAMUSCULAR; INTRAVENOUS; SUBCUTANEOUS at 21:49

## 2023-01-20 NOTE — ED PROVIDER NOTE - OBJECTIVE STATEMENT
77-year-old female from local nursing home brought in by her daughter from the nursing home with a chief complaint of losing dexterity of the hands tremors and the patient reported occasionally forgot the support of her daughter which is not unusual patient also reported urgency and frequency of urination patient was able to ambulate normally to today without any support but according to the patient the gait has become slightly worse patient is on Klonopin Depakote donezepil gabapentin Seroquel and trazodone patient also reported that the rainout of Seroquel which the nursing home people thought may have caused this issue but Seroquel was eventually given in the patient's is much calmer now denies any fever

## 2023-01-20 NOTE — ED ADULT NURSE NOTE - OBJECTIVE STATEMENT
Pt is alert, brought to the ER by daughter due to feeling week. Pt states that she did not take her Seroquel for a week because she ran out. Started taking it today. Denies any headache or chest pain or SOB. Denies drinking alcohol.

## 2023-01-20 NOTE — ED PROVIDER NOTE - NSICDXPASTMEDICALHX_GEN_ALL_CORE_FT
PAST MEDICAL HISTORY:  Dementia     Depression     Schizo affective schizophrenia     Schizophrenia

## 2023-01-20 NOTE — ED PROVIDER NOTE - PROGRESS NOTE DETAILS
On reassessment, patient is significantly tremulous, fatigued, and is unsteady on her feet, which is a change from her baseline according to her daughter at the bedside.  At baseline patient is active and performs ADLs without assistance, but over the last 2 days has progressively become incontinent to the point that she is wearing diapers, and requiring significantly more assistance with daily tasks, the family's concern for her functional decline, does not feel that they are able to care for her at home.  In addition patient is on multiple medications that may be contributing to her weakness, alongside testing positive for COVID-19 in the ED.  Patient appears too weak to ambulate at this time, and the family does not feel that they are able to safely care for her in the home.  Plan for inpatient admission.

## 2023-01-20 NOTE — ED PROVIDER NOTE - CLINICAL SUMMARY MEDICAL DECISION MAKING FREE TEXT BOX
77-year-old female from local nursing home brought in by her daughter from the nursing home with a chief complaint of losing dexterity of the hands tremors and the patient reported occasionally forgot the support of her daughter which is not unusual patient also reported urgency and frequency of urination patient was able to ambulate normally to today without any support but according to the patient the gait has become slightly worse patient is on Klonopin Depakote donezepil gabapentin Seroquel and trazodone patient also reported that the rainout of Seroquel which the nursing home people thought may have caused this issue but Seroquel was eventually given in the patient's is much calmer now denies any fever  Patient's labs CBC CMP within normal limits head CT performed report pending UA urine culture sent the report for UA is pending IV hydration was ordered patient was signed out to the night ED team for follow-up of the results and disposition  dr hellen chase

## 2023-01-20 NOTE — ED PROVIDER NOTE - PHYSICAL EXAMINATION
General:     NAD, well-nourished, well-appearing  Head:     NC/AT, EOMI, oral mucosa moist  Neck:     trachea midline  Lungs:     CTA b/l, no w/r/r  CVS:     S1S2, RRR, no m/g/r  Abd:     +BS, s/nt/nd, no organomegaly  Ext:    2+ radial and pedal pulses, no c/c/e  Neuro: AAOx3, no sensory/ + upper limb tremors

## 2023-01-21 DIAGNOSIS — R25.1 TREMOR, UNSPECIFIED: ICD-10-CM

## 2023-01-21 LAB
ALBUMIN SERPL ELPH-MCNC: 3.7 G/DL — SIGNIFICANT CHANGE UP (ref 3.3–5)
ALP SERPL-CCNC: 84 U/L — SIGNIFICANT CHANGE UP (ref 40–120)
ALT FLD-CCNC: 10 U/L — SIGNIFICANT CHANGE UP (ref 10–45)
ANION GAP SERPL CALC-SCNC: 10 MMOL/L — SIGNIFICANT CHANGE UP (ref 5–17)
AST SERPL-CCNC: 18 U/L — SIGNIFICANT CHANGE UP (ref 10–40)
BASOPHILS # BLD AUTO: 0.03 K/UL — SIGNIFICANT CHANGE UP (ref 0–0.2)
BASOPHILS NFR BLD AUTO: 0.4 % — SIGNIFICANT CHANGE UP (ref 0–2)
BILIRUB DIRECT SERPL-MCNC: 0.1 MG/DL — SIGNIFICANT CHANGE UP (ref 0–0.3)
BILIRUB INDIRECT FLD-MCNC: 0.3 MG/DL — SIGNIFICANT CHANGE UP (ref 0.2–1)
BILIRUB SERPL-MCNC: 0.4 MG/DL — SIGNIFICANT CHANGE UP (ref 0.2–1.2)
BUN SERPL-MCNC: 11 MG/DL — SIGNIFICANT CHANGE UP (ref 7–23)
CALCIUM SERPL-MCNC: 8.9 MG/DL — SIGNIFICANT CHANGE UP (ref 8.4–10.5)
CHLORIDE SERPL-SCNC: 104 MMOL/L — SIGNIFICANT CHANGE UP (ref 96–108)
CO2 SERPL-SCNC: 25 MMOL/L — SIGNIFICANT CHANGE UP (ref 22–31)
CREAT SERPL-MCNC: 0.71 MG/DL — SIGNIFICANT CHANGE UP (ref 0.5–1.3)
CREAT SERPL-MCNC: 0.74 MG/DL — SIGNIFICANT CHANGE UP (ref 0.5–1.3)
EGFR: 85 ML/MIN/1.73M2 — SIGNIFICANT CHANGE UP
EGFR: 88 ML/MIN/1.73M2 — SIGNIFICANT CHANGE UP
EOSINOPHIL # BLD AUTO: 0.01 K/UL — SIGNIFICANT CHANGE UP (ref 0–0.5)
EOSINOPHIL NFR BLD AUTO: 0.1 % — SIGNIFICANT CHANGE UP (ref 0–6)
GLUCOSE SERPL-MCNC: 94 MG/DL — SIGNIFICANT CHANGE UP (ref 70–99)
HCT VFR BLD CALC: 35.9 % — SIGNIFICANT CHANGE UP (ref 34.5–45)
HCV AB S/CO SERPL IA: 0.07 S/CO — SIGNIFICANT CHANGE UP (ref 0–0.99)
HCV AB SERPL-IMP: SIGNIFICANT CHANGE UP
HGB BLD-MCNC: 11.7 G/DL — SIGNIFICANT CHANGE UP (ref 11.5–15.5)
IMM GRANULOCYTES NFR BLD AUTO: 0.3 % — SIGNIFICANT CHANGE UP (ref 0–0.9)
INR BLD: 1 RATIO — SIGNIFICANT CHANGE UP (ref 0.88–1.16)
LYMPHOCYTES # BLD AUTO: 1.58 K/UL — SIGNIFICANT CHANGE UP (ref 1–3.3)
LYMPHOCYTES # BLD AUTO: 23.1 % — SIGNIFICANT CHANGE UP (ref 13–44)
MCHC RBC-ENTMCNC: 30.5 PG — SIGNIFICANT CHANGE UP (ref 27–34)
MCHC RBC-ENTMCNC: 32.6 GM/DL — SIGNIFICANT CHANGE UP (ref 32–36)
MCV RBC AUTO: 93.7 FL — SIGNIFICANT CHANGE UP (ref 80–100)
MONOCYTES # BLD AUTO: 0.84 K/UL — SIGNIFICANT CHANGE UP (ref 0–0.9)
MONOCYTES NFR BLD AUTO: 12.3 % — SIGNIFICANT CHANGE UP (ref 2–14)
NEUTROPHILS # BLD AUTO: 4.35 K/UL — SIGNIFICANT CHANGE UP (ref 1.8–7.4)
NEUTROPHILS NFR BLD AUTO: 63.8 % — SIGNIFICANT CHANGE UP (ref 43–77)
NRBC # BLD: 0 /100 WBCS — SIGNIFICANT CHANGE UP (ref 0–0)
PLATELET # BLD AUTO: 230 K/UL — SIGNIFICANT CHANGE UP (ref 150–400)
POTASSIUM SERPL-MCNC: 3.9 MMOL/L — SIGNIFICANT CHANGE UP (ref 3.5–5.3)
POTASSIUM SERPL-SCNC: 3.9 MMOL/L — SIGNIFICANT CHANGE UP (ref 3.5–5.3)
PROT SERPL-MCNC: 6.7 G/DL — SIGNIFICANT CHANGE UP (ref 6–8.3)
PROTHROM AB SERPL-ACNC: 11.6 SEC — SIGNIFICANT CHANGE UP (ref 10.5–13.4)
RBC # BLD: 3.83 M/UL — SIGNIFICANT CHANGE UP (ref 3.8–5.2)
RBC # FLD: 13.3 % — SIGNIFICANT CHANGE UP (ref 10.3–14.5)
SODIUM SERPL-SCNC: 139 MMOL/L — SIGNIFICANT CHANGE UP (ref 135–145)
WBC # BLD: 6.83 K/UL — SIGNIFICANT CHANGE UP (ref 3.8–10.5)
WBC # FLD AUTO: 6.83 K/UL — SIGNIFICANT CHANGE UP (ref 3.8–10.5)

## 2023-01-21 PROCEDURE — 12345: CPT | Mod: NC

## 2023-01-21 PROCEDURE — 99223 1ST HOSP IP/OBS HIGH 75: CPT

## 2023-01-21 RX ORDER — DONEPEZIL HYDROCHLORIDE 10 MG/1
5 TABLET, FILM COATED ORAL AT BEDTIME
Refills: 0 | Status: DISCONTINUED | OUTPATIENT
Start: 2023-01-21 | End: 2023-01-27

## 2023-01-21 RX ORDER — DIVALPROEX SODIUM 500 MG/1
2 TABLET, DELAYED RELEASE ORAL
Qty: 0 | Refills: 0 | DISCHARGE

## 2023-01-21 RX ORDER — DIVALPROEX SODIUM 500 MG/1
1000 TABLET, DELAYED RELEASE ORAL AT BEDTIME
Refills: 0 | Status: DISCONTINUED | OUTPATIENT
Start: 2023-01-21 | End: 2023-01-21

## 2023-01-21 RX ORDER — CLONAZEPAM 1 MG
0.5 TABLET ORAL AT BEDTIME
Refills: 0 | Status: DISCONTINUED | OUTPATIENT
Start: 2023-01-21 | End: 2023-01-23

## 2023-01-21 RX ORDER — REMDESIVIR 5 MG/ML
100 INJECTION INTRAVENOUS EVERY 24 HOURS
Refills: 0 | Status: DISCONTINUED | OUTPATIENT
Start: 2023-01-22 | End: 2023-01-25

## 2023-01-21 RX ORDER — ONDANSETRON 8 MG/1
4 TABLET, FILM COATED ORAL EVERY 8 HOURS
Refills: 0 | Status: DISCONTINUED | OUTPATIENT
Start: 2023-01-21 | End: 2023-01-27

## 2023-01-21 RX ORDER — TRAZODONE HCL 50 MG
100 TABLET ORAL AT BEDTIME
Refills: 0 | Status: DISCONTINUED | OUTPATIENT
Start: 2023-01-21 | End: 2023-01-27

## 2023-01-21 RX ORDER — REMDESIVIR 5 MG/ML
INJECTION INTRAVENOUS
Refills: 0 | Status: DISCONTINUED | OUTPATIENT
Start: 2023-01-21 | End: 2023-01-25

## 2023-01-21 RX ORDER — QUETIAPINE FUMARATE 200 MG/1
250 TABLET, FILM COATED ORAL AT BEDTIME
Refills: 0 | Status: DISCONTINUED | OUTPATIENT
Start: 2023-01-21 | End: 2023-01-27

## 2023-01-21 RX ORDER — TRAZODONE HCL 50 MG
1 TABLET ORAL
Qty: 0 | Refills: 0 | DISCHARGE

## 2023-01-21 RX ORDER — TRAZODONE HCL 50 MG
0 TABLET ORAL
Qty: 0 | Refills: 0 | DISCHARGE

## 2023-01-21 RX ORDER — CLONAZEPAM 1 MG
1 TABLET ORAL
Qty: 0 | Refills: 0 | DISCHARGE

## 2023-01-21 RX ORDER — PALIPERIDONE 1.5 MG/1
0 TABLET, EXTENDED RELEASE ORAL
Qty: 0 | Refills: 0 | DISCHARGE

## 2023-01-21 RX ORDER — DOCUSATE SODIUM 100 MG
2 CAPSULE ORAL
Qty: 0 | Refills: 0 | DISCHARGE

## 2023-01-21 RX ORDER — PALIPERIDONE 1.5 MG/1
6 TABLET, EXTENDED RELEASE ORAL DAILY
Refills: 0 | Status: DISCONTINUED | OUTPATIENT
Start: 2023-01-21 | End: 2023-01-27

## 2023-01-21 RX ORDER — LANOLIN ALCOHOL/MO/W.PET/CERES
3 CREAM (GRAM) TOPICAL AT BEDTIME
Refills: 0 | Status: DISCONTINUED | OUTPATIENT
Start: 2023-01-21 | End: 2023-01-27

## 2023-01-21 RX ORDER — CLONAZEPAM 1 MG
0 TABLET ORAL
Qty: 0 | Refills: 0 | DISCHARGE

## 2023-01-21 RX ORDER — DONEPEZIL HYDROCHLORIDE 10 MG/1
1 TABLET, FILM COATED ORAL
Qty: 0 | Refills: 0 | DISCHARGE

## 2023-01-21 RX ORDER — QUETIAPINE FUMARATE 200 MG/1
1 TABLET, FILM COATED ORAL
Qty: 0 | Refills: 0 | DISCHARGE

## 2023-01-21 RX ORDER — ENOXAPARIN SODIUM 100 MG/ML
40 INJECTION SUBCUTANEOUS EVERY 24 HOURS
Refills: 0 | Status: DISCONTINUED | OUTPATIENT
Start: 2023-01-21 | End: 2023-01-23

## 2023-01-21 RX ORDER — GABAPENTIN 400 MG/1
1 CAPSULE ORAL
Qty: 0 | Refills: 0 | DISCHARGE

## 2023-01-21 RX ORDER — CHOLECALCIFEROL (VITAMIN D3) 125 MCG
2000 CAPSULE ORAL DAILY
Refills: 0 | Status: DISCONTINUED | OUTPATIENT
Start: 2023-01-21 | End: 2023-01-27

## 2023-01-21 RX ORDER — SODIUM CHLORIDE 9 MG/ML
1000 INJECTION, SOLUTION INTRAVENOUS
Refills: 0 | Status: DISCONTINUED | OUTPATIENT
Start: 2023-01-21 | End: 2023-01-21

## 2023-01-21 RX ORDER — PALIPERIDONE 1.5 MG/1
1 TABLET, EXTENDED RELEASE ORAL
Qty: 0 | Refills: 0 | DISCHARGE

## 2023-01-21 RX ORDER — MELOXICAM 15 MG/1
1 TABLET ORAL
Qty: 0 | Refills: 0 | DISCHARGE

## 2023-01-21 RX ORDER — ACETAMINOPHEN 500 MG
650 TABLET ORAL EVERY 6 HOURS
Refills: 0 | Status: DISCONTINUED | OUTPATIENT
Start: 2023-01-21 | End: 2023-01-27

## 2023-01-21 RX ORDER — DIVALPROEX SODIUM 500 MG/1
500 TABLET, DELAYED RELEASE ORAL AT BEDTIME
Refills: 0 | Status: DISCONTINUED | OUTPATIENT
Start: 2023-01-21 | End: 2023-01-27

## 2023-01-21 RX ORDER — INFLUENZA VIRUS VACCINE 15; 15; 15; 15 UG/.5ML; UG/.5ML; UG/.5ML; UG/.5ML
0.7 SUSPENSION INTRAMUSCULAR ONCE
Refills: 0 | Status: DISCONTINUED | OUTPATIENT
Start: 2023-01-21 | End: 2023-01-27

## 2023-01-21 RX ORDER — DONEPEZIL HYDROCHLORIDE 10 MG/1
0 TABLET, FILM COATED ORAL
Qty: 0 | Refills: 0 | DISCHARGE

## 2023-01-21 RX ORDER — REMDESIVIR 5 MG/ML
200 INJECTION INTRAVENOUS EVERY 24 HOURS
Refills: 0 | Status: COMPLETED | OUTPATIENT
Start: 2023-01-21 | End: 2023-01-21

## 2023-01-21 RX ORDER — GABAPENTIN 400 MG/1
300 CAPSULE ORAL
Refills: 0 | Status: DISCONTINUED | OUTPATIENT
Start: 2023-01-21 | End: 2023-01-27

## 2023-01-21 RX ORDER — ZINC SULFATE TAB 220 MG (50 MG ZINC EQUIVALENT) 220 (50 ZN) MG
220 TAB ORAL DAILY
Refills: 0 | Status: DISCONTINUED | OUTPATIENT
Start: 2023-01-21 | End: 2023-01-27

## 2023-01-21 RX ORDER — POLYETHYLENE GLYCOL 3350 17 G/17G
1 POWDER, FOR SOLUTION ORAL
Qty: 0 | Refills: 0 | DISCHARGE

## 2023-01-21 RX ORDER — DIVALPROEX SODIUM 500 MG/1
1 TABLET, DELAYED RELEASE ORAL
Qty: 0 | Refills: 0 | DISCHARGE

## 2023-01-21 RX ORDER — ACETAMINOPHEN 500 MG
2 TABLET ORAL
Qty: 0 | Refills: 0 | DISCHARGE

## 2023-01-21 RX ADMIN — Medication 100 MILLIGRAM(S): at 22:17

## 2023-01-21 RX ADMIN — Medication 650 MILLIGRAM(S): at 22:17

## 2023-01-21 RX ADMIN — SODIUM CHLORIDE 100 MILLILITER(S): 9 INJECTION, SOLUTION INTRAVENOUS at 12:13

## 2023-01-21 RX ADMIN — REMDESIVIR 200 MILLIGRAM(S): 5 INJECTION INTRAVENOUS at 09:29

## 2023-01-21 RX ADMIN — QUETIAPINE FUMARATE 250 MILLIGRAM(S): 200 TABLET, FILM COATED ORAL at 22:17

## 2023-01-21 RX ADMIN — DIVALPROEX SODIUM 500 MILLIGRAM(S): 500 TABLET, DELAYED RELEASE ORAL at 22:16

## 2023-01-21 RX ADMIN — Medication 650 MILLIGRAM(S): at 23:00

## 2023-01-21 RX ADMIN — Medication 0.5 MILLIGRAM(S): at 22:18

## 2023-01-21 RX ADMIN — Medication 10 MILLIGRAM(S): at 17:30

## 2023-01-21 RX ADMIN — GABAPENTIN 300 MILLIGRAM(S): 400 CAPSULE ORAL at 13:19

## 2023-01-21 RX ADMIN — DONEPEZIL HYDROCHLORIDE 5 MILLIGRAM(S): 10 TABLET, FILM COATED ORAL at 22:17

## 2023-01-21 RX ADMIN — PALIPERIDONE 6 MILLIGRAM(S): 1.5 TABLET, EXTENDED RELEASE ORAL at 11:32

## 2023-01-21 NOTE — CHART NOTE - NSCHARTNOTEFT_GEN_A_CORE
Chart reviewed  Patient admitted this AM for COVID PNA    Med recon reviewed; ordered gabapentin 300mg BID  Depakote noted to be 500mg qhs; changed order    C/w remdesivir, no need for decadron as patient is not hypoxic  Will d/c iVF and encourage oral intake  DISP: to [facility] once stable

## 2023-01-21 NOTE — PATIENT PROFILE ADULT - FALL HARM RISK - HARM RISK INTERVENTIONS
Communicate Risk of Fall with Harm to all staff/Reinforce activity limits and safety measures with patient and family/Review medications for side effects contributing to fall risk/Tailored Fall Risk Interventions/Visual Cue: Yellow wristband and red socks/Bed in lowest position, wheels locked, appropriate side rails in place/Call bell, personal items and telephone in reach/Instruct patient to call for assistance before getting out of bed or chair/Non-slip footwear when patient is out of bed/[place] to call system/Physically safe environment - no spills, clutter or unnecessary equipment/Purposeful Proactive Rounding/Room/bathroom lighting operational, light cord in reach

## 2023-01-21 NOTE — H&P ADULT - NSHPLABSRESULTS_GEN_ALL_CORE
11.6                 137  | 27   | 14           7.61  >-----------< 226     ------------------------< 112                   34.6                 4.2  | 101  | 0.94                                         Ca 9.2   Mg x     Ph x      Urinalysis Basic - ( 2023 22:15 )    Color: Yellow / Appearance: Clear / S.015 / pH: x  Gluc: x / Ketone: Negative  / Bili: Negative / Urobili: Negative   Blood: x / Protein: Negative / Nitrite: Negative   Leuk Esterase: Negative / RBC: x / WBC x   Sq Epi: x / Non Sq Epi: x / Bacteria: x      old chats reviewed    labs reviewed:     CXR personally reviewed: ?infiltrate    ECG reviewed and interpreted: 11.6                 137  | 27   | 14           7.61  >-----------< 226     ------------------------< 112                   34.6                 4.2  | 101  | 0.94                                         Ca 9.2   Mg x     Ph x      Urinalysis Basic - ( 2023 22:15 )    Color: Yellow / Appearance: Clear / S.015 / pH: x  Gluc: x / Ketone: Negative  / Bili: Negative / Urobili: Negative   Blood: x / Protein: Negative / Nitrite: Negative   Leuk Esterase: Negative / RBC: x / WBC x   Sq Epi: x / Non Sq Epi: x / Bacteria: x      old chats reviewed    labs reviewed:     CXR personally reviewed: ?infiltrate    ECG reviewed and interpreted: sinus 80

## 2023-01-21 NOTE — H&P ADULT - NSHPPHYSICALEXAM_GEN_ALL_CORE
Vital Signs Last 24 Hrs  T(C): 36.6 (20 Jan 2023 20:23), Max: 36.6 (20 Jan 2023 20:23)  T(F): 97.8 (20 Jan 2023 20:23), Max: 97.8 (20 Jan 2023 20:23)  HR: 110 (20 Jan 2023 20:23) (110 - 110)  BP: 132/81 (20 Jan 2023 20:23) (132/81 - 132/81)  BP(mean): --  RR: 18 (20 Jan 2023 20:23) (18 - 18)  SpO2: 95% (20 Jan 2023 20:23) (95% - 95%)    Parameters below as of 20 Jan 2023 20:23  Patient On (Oxygen Delivery Method): room air Vital Signs Last 24 Hrs  T(C): 36.6 (20 Jan 2023 20:23), Max: 36.6 (20 Jan 2023 20:23)  T(F): 97.8 (20 Jan 2023 20:23), Max: 97.8 (20 Jan 2023 20:23)  HR: 110 (20 Jan 2023 20:23) (110 - 110)  BP: 132/81 (20 Jan 2023 20:23) (132/81 - 132/81)  BP(mean): --  RR: 18 (20 Jan 2023 20:23) (18 - 18)  SpO2: 95% (20 Jan 2023 20:23) (95% - 95%)    Parameters below as of 20 Jan 2023 20:23  Patient On (Oxygen Delivery Method): room air      GENERAL- NAD  EAR/NOSE/MOUTH/THROAT - no pharyngeal exudates, no oral lesions,  MMM  EYES- ADÁN, conjunctiva and Sclera clear  NECK- supple  RESPIRATORY-  clear to auscultation bilaterally, non laboured breathing  CARDIOVASCULAR - SIS2, RRR  GI - soft NT BS present  EXTREMITIES- no pedal edema  NEUROLOGY- no gross focal deficits  SKIN- no rashes, warm to touch  PSYCHIATRY- AAO X 3  MUSCULOSKELETAL- ROM normal

## 2023-01-21 NOTE — H&P ADULT - HISTORY OF PRESENT ILLNESS
75F h/o mild dementia, lives in [facility], comes in c/o weakness, lethargy, unsteady gait, incontinent of urine  x 2 days unable to perform ADL'S.  baseline is independent.   in ED- noted to be covid 19 positive, head ct negative 75F h/o mild dementia, lives in [facility], comes in c/o x 2 days of cough, weakness, lethargy, unsteady gait, incontinent of urine  x 2 days unable to perform ADL'S.  baseline is independent.   in ED- noted to be covid 19 positive, head ct negative

## 2023-01-21 NOTE — H&P ADULT - NSICDXFAMILYHX_GEN_ALL_CORE_FT
FAMILY HISTORY:  Father  Still living? Unknown  FH: type 2 diabetes, Age at diagnosis: Age Unknown    Mother  Still living? No  FH: type 2 diabetes, Age at diagnosis: Age Unknown

## 2023-01-22 LAB
ALBUMIN SERPL ELPH-MCNC: 3.3 G/DL — SIGNIFICANT CHANGE UP (ref 3.3–5)
ALP SERPL-CCNC: 80 U/L — SIGNIFICANT CHANGE UP (ref 40–120)
ALT FLD-CCNC: 14 U/L — SIGNIFICANT CHANGE UP (ref 10–45)
ANION GAP SERPL CALC-SCNC: 8 MMOL/L — SIGNIFICANT CHANGE UP (ref 5–17)
AST SERPL-CCNC: 16 U/L — SIGNIFICANT CHANGE UP (ref 10–40)
BILIRUB DIRECT SERPL-MCNC: 0.1 MG/DL — SIGNIFICANT CHANGE UP (ref 0–0.3)
BILIRUB INDIRECT FLD-MCNC: 0.3 MG/DL — SIGNIFICANT CHANGE UP (ref 0.2–1)
BILIRUB SERPL-MCNC: 0.4 MG/DL — SIGNIFICANT CHANGE UP (ref 0.2–1.2)
BUN SERPL-MCNC: 13 MG/DL — SIGNIFICANT CHANGE UP (ref 7–23)
CALCIUM SERPL-MCNC: 8.9 MG/DL — SIGNIFICANT CHANGE UP (ref 8.4–10.5)
CHLORIDE SERPL-SCNC: 103 MMOL/L — SIGNIFICANT CHANGE UP (ref 96–108)
CO2 SERPL-SCNC: 28 MMOL/L — SIGNIFICANT CHANGE UP (ref 22–31)
CREAT SERPL-MCNC: 0.76 MG/DL — SIGNIFICANT CHANGE UP (ref 0.5–1.3)
CREAT SERPL-MCNC: 0.76 MG/DL — SIGNIFICANT CHANGE UP (ref 0.5–1.3)
CRP SERPL-MCNC: 50 MG/L — HIGH
CULTURE RESULTS: SIGNIFICANT CHANGE UP
D DIMER BLD IA.RAPID-MCNC: 544 NG/ML DDU — HIGH
EGFR: 81 ML/MIN/1.73M2 — SIGNIFICANT CHANGE UP
EGFR: 82 ML/MIN/1.73M2 — SIGNIFICANT CHANGE UP
FERRITIN SERPL-MCNC: 66 NG/ML — SIGNIFICANT CHANGE UP (ref 15–150)
GLUCOSE SERPL-MCNC: 112 MG/DL — HIGH (ref 70–99)
HCT VFR BLD CALC: 34.2 % — LOW (ref 34.5–45)
HGB BLD-MCNC: 11.3 G/DL — LOW (ref 11.5–15.5)
INR BLD: 1.04 RATIO — SIGNIFICANT CHANGE UP (ref 0.88–1.16)
LDH SERPL L TO P-CCNC: 232 U/L — SIGNIFICANT CHANGE UP (ref 50–242)
MCHC RBC-ENTMCNC: 31 PG — SIGNIFICANT CHANGE UP (ref 27–34)
MCHC RBC-ENTMCNC: 33 GM/DL — SIGNIFICANT CHANGE UP (ref 32–36)
MCV RBC AUTO: 94 FL — SIGNIFICANT CHANGE UP (ref 80–100)
NRBC # BLD: 0 /100 WBCS — SIGNIFICANT CHANGE UP (ref 0–0)
PLATELET # BLD AUTO: 208 K/UL — SIGNIFICANT CHANGE UP (ref 150–400)
POTASSIUM SERPL-MCNC: 3.8 MMOL/L — SIGNIFICANT CHANGE UP (ref 3.5–5.3)
POTASSIUM SERPL-SCNC: 3.8 MMOL/L — SIGNIFICANT CHANGE UP (ref 3.5–5.3)
PROT SERPL-MCNC: 6.4 G/DL — SIGNIFICANT CHANGE UP (ref 6–8.3)
PROTHROM AB SERPL-ACNC: 12.1 SEC — SIGNIFICANT CHANGE UP (ref 10.5–13.4)
RBC # BLD: 3.64 M/UL — LOW (ref 3.8–5.2)
RBC # FLD: 13.5 % — SIGNIFICANT CHANGE UP (ref 10.3–14.5)
SODIUM SERPL-SCNC: 139 MMOL/L — SIGNIFICANT CHANGE UP (ref 135–145)
SPECIMEN SOURCE: SIGNIFICANT CHANGE UP
TROPONIN I, HIGH SENSITIVITY RESULT: 6 NG/L — SIGNIFICANT CHANGE UP
WBC # BLD: 8.75 K/UL — SIGNIFICANT CHANGE UP (ref 3.8–10.5)
WBC # FLD AUTO: 8.75 K/UL — SIGNIFICANT CHANGE UP (ref 3.8–10.5)

## 2023-01-22 PROCEDURE — 99233 SBSQ HOSP IP/OBS HIGH 50: CPT

## 2023-01-22 RX ORDER — POLYETHYLENE GLYCOL 3350 17 G/17G
17 POWDER, FOR SOLUTION ORAL DAILY
Refills: 0 | Status: DISCONTINUED | OUTPATIENT
Start: 2023-01-22 | End: 2023-01-27

## 2023-01-22 RX ADMIN — GABAPENTIN 300 MILLIGRAM(S): 400 CAPSULE ORAL at 17:44

## 2023-01-22 RX ADMIN — QUETIAPINE FUMARATE 250 MILLIGRAM(S): 200 TABLET, FILM COATED ORAL at 21:25

## 2023-01-22 RX ADMIN — Medication 2000 UNIT(S): at 13:03

## 2023-01-22 RX ADMIN — Medication 0.5 MILLIGRAM(S): at 21:25

## 2023-01-22 RX ADMIN — GABAPENTIN 300 MILLIGRAM(S): 400 CAPSULE ORAL at 06:19

## 2023-01-22 RX ADMIN — Medication 650 MILLIGRAM(S): at 17:44

## 2023-01-22 RX ADMIN — DONEPEZIL HYDROCHLORIDE 5 MILLIGRAM(S): 10 TABLET, FILM COATED ORAL at 21:26

## 2023-01-22 RX ADMIN — REMDESIVIR 200 MILLIGRAM(S): 5 INJECTION INTRAVENOUS at 08:00

## 2023-01-22 RX ADMIN — DIVALPROEX SODIUM 500 MILLIGRAM(S): 500 TABLET, DELAYED RELEASE ORAL at 21:26

## 2023-01-22 RX ADMIN — Medication 100 MILLIGRAM(S): at 21:25

## 2023-01-22 RX ADMIN — ZINC SULFATE TAB 220 MG (50 MG ZINC EQUIVALENT) 220 MILLIGRAM(S): 220 (50 ZN) TAB at 13:03

## 2023-01-22 RX ADMIN — ENOXAPARIN SODIUM 40 MILLIGRAM(S): 100 INJECTION SUBCUTANEOUS at 06:19

## 2023-01-22 RX ADMIN — PALIPERIDONE 6 MILLIGRAM(S): 1.5 TABLET, EXTENDED RELEASE ORAL at 13:02

## 2023-01-22 NOTE — PROGRESS NOTE ADULT - ASSESSMENT
75 female with mild dementia, from [facility] presents with cough, weakness, lethargy, unsteady gait, incontinent of urine x 2 days unable to perform ADLs (baseline independent) found with Covid 19.    Acute Covid 19 Infection  Functional Decline likely due to above  Covid PCR positive on 1/20/23, D [facility] 544  CT head in ED negative  Continue remdesivir  Not hypoxic, stable respiratory status on room air  Supportive care  Airborne Precautions  Fall Precautions  PT evaluation    Anxiety  Continue klonopin    Depression  Continue depakote, neurontin, paliperidone, seroquel    Insomnia  Continue trazodone    Dementia  Continue Aricept    Prophylactic Measure  lovenox    spoke to and provided an update for micheal crandall [phone], all questions answered

## 2023-01-22 NOTE — PROGRESS NOTE ADULT - SUBJECTIVE AND OBJECTIVE BOX
Patient is a 77y old  Female who presents with a chief complaint of weakness, cough (2023 01:39)    Patient seen and examined at bedside.  No acute events overnight    ALLERGIES:  No Known Allergies        Vital Signs Last 24 Hrs  T(F): 97.9 (2023 06:14), Max: 99.6 (2023 21:55)  HR: 81 (2023 06:14) (81 - 103)  BP: 121/76 (2023 06:14) (121/76 - 148/78)  RR: 18 (2023 06:14) (17 - 19)  SpO2: 95% (2023 06:14) (95% - 99%)  I&O's Summary    MEDICATIONS:  acetaminophen     Tablet .. 650 milliGRAM(s) Oral every 6 hours PRN  aluminum hydroxide/magnesium hydroxide/simethicone Suspension 30 milliLiter(s) Oral every 4 hours PRN  cholecalciferol 2000 Unit(s) Oral daily  clonazePAM  Tablet 0.5 milliGRAM(s) Oral at bedtime  divalproex  milliGRAM(s) Oral at bedtime  donepezil 5 milliGRAM(s) Oral at bedtime  enoxaparin Injectable 40 milliGRAM(s) SubCutaneous every 24 hours  gabapentin 300 milliGRAM(s) Oral two times a day  influenza  Vaccine (HIGH DOSE) 0.7 milliLiter(s) IntraMuscular once  melatonin 3 milliGRAM(s) Oral at bedtime PRN  ondansetron Injectable 4 milliGRAM(s) IV Push every 8 hours PRN  paliperidone ER. 6 milliGRAM(s) Oral daily  QUEtiapine 250 milliGRAM(s) Oral at bedtime  remdesivir  IVPB 100 milliGRAM(s) IV Intermittent every 24 hours  remdesivir  IVPB   IV Intermittent   traZODone 100 milliGRAM(s) Oral at bedtime  zinc sulfate 220 milliGRAM(s) Oral daily      PHYSICAL EXAM:  General: NAD, Well nourished, Well appearing  ENT: MMM, no thrush  Neck: Supple, No JVD  Lungs: Clear to auscultation bilaterally, non labored, bilateral air entry  Cardio: RRR, S1/S2, No murmurs  Abdomen: Soft, Nontender, Nondistended; Bowel sounds present  Extremities: No cyanosis, No edema    LABS:                        11.3   8.75  )-----------( 208      ( 2023 06:10 )             34.2         139  |  103  |  13  ----------------------------<  112  3.8   |  28  |  0.76    Ca    8.9      2023 06:10    TPro  6.4  /  Alb  3.3  /  TBili  0.4  /  DBili  0.1  /  AST  16  /  ALT  14  /  AlkPhos  80        PT/INR - ( 2023 06:10 )   PT: 12.1 sec;   INR: 1.04 ratio             CARDIAC MARKERS ( 2023 06:10 )  x     / 6.0 ng/L / x     / x     / x                            Urinalysis Basic - ( 2023 22:15 )    Color: Yellow / Appearance: Clear / S.015 / pH: x  Gluc: x / Ketone: Negative  / Bili: Negative / Urobili: Negative   Blood: x / Protein: Negative / Nitrite: Negative   Leuk Esterase: Negative / RBC: x / WBC x   Sq Epi: x / Non Sq Epi: x / Bacteria: x        COVID-19 PCR: Detected (23 @ 22:00)      RADIOLOGY & ADDITIONAL TESTS:    Care Discussed with Consultants/Other Providers:

## 2023-01-23 LAB
ALBUMIN SERPL ELPH-MCNC: 3 G/DL — LOW (ref 3.3–5)
ALP SERPL-CCNC: 78 U/L — SIGNIFICANT CHANGE UP (ref 40–120)
ALT FLD-CCNC: 13 U/L — SIGNIFICANT CHANGE UP (ref 10–45)
ANION GAP SERPL CALC-SCNC: 11 MMOL/L — SIGNIFICANT CHANGE UP (ref 5–17)
AST SERPL-CCNC: 21 U/L — SIGNIFICANT CHANGE UP (ref 10–40)
BILIRUB DIRECT SERPL-MCNC: 0 MG/DL — SIGNIFICANT CHANGE UP (ref 0–0.3)
BILIRUB INDIRECT FLD-MCNC: 0.3 MG/DL — SIGNIFICANT CHANGE UP (ref 0.2–1)
BILIRUB SERPL-MCNC: 0.3 MG/DL — SIGNIFICANT CHANGE UP (ref 0.2–1.2)
BUN SERPL-MCNC: 25 MG/DL — HIGH (ref 7–23)
CALCIUM SERPL-MCNC: 8.6 MG/DL — SIGNIFICANT CHANGE UP (ref 8.4–10.5)
CHLORIDE SERPL-SCNC: 101 MMOL/L — SIGNIFICANT CHANGE UP (ref 96–108)
CO2 SERPL-SCNC: 26 MMOL/L — SIGNIFICANT CHANGE UP (ref 22–31)
CREAT SERPL-MCNC: 1.02 MG/DL — SIGNIFICANT CHANGE UP (ref 0.5–1.3)
CREAT SERPL-MCNC: 1.07 MG/DL — SIGNIFICANT CHANGE UP (ref 0.5–1.3)
EGFR: 54 ML/MIN/1.73M2 — LOW
EGFR: 58 ML/MIN/1.73M2 — LOW
GLUCOSE SERPL-MCNC: 103 MG/DL — HIGH (ref 70–99)
HCT VFR BLD CALC: 34.4 % — LOW (ref 34.5–45)
HGB BLD-MCNC: 11.2 G/DL — LOW (ref 11.5–15.5)
INR BLD: 1.1 RATIO — SIGNIFICANT CHANGE UP (ref 0.88–1.16)
MCHC RBC-ENTMCNC: 30.4 PG — SIGNIFICANT CHANGE UP (ref 27–34)
MCHC RBC-ENTMCNC: 32.6 GM/DL — SIGNIFICANT CHANGE UP (ref 32–36)
MCV RBC AUTO: 93.2 FL — SIGNIFICANT CHANGE UP (ref 80–100)
NRBC # BLD: 0 /100 WBCS — SIGNIFICANT CHANGE UP (ref 0–0)
PLATELET # BLD AUTO: 225 K/UL — SIGNIFICANT CHANGE UP (ref 150–400)
POTASSIUM SERPL-MCNC: 4.2 MMOL/L — SIGNIFICANT CHANGE UP (ref 3.5–5.3)
POTASSIUM SERPL-SCNC: 4.2 MMOL/L — SIGNIFICANT CHANGE UP (ref 3.5–5.3)
PROT SERPL-MCNC: 6.3 G/DL — SIGNIFICANT CHANGE UP (ref 6–8.3)
PROTHROM AB SERPL-ACNC: 12.8 SEC — SIGNIFICANT CHANGE UP (ref 10.5–13.4)
RBC # BLD: 3.69 M/UL — LOW (ref 3.8–5.2)
RBC # FLD: 13.1 % — SIGNIFICANT CHANGE UP (ref 10.3–14.5)
SODIUM SERPL-SCNC: 138 MMOL/L — SIGNIFICANT CHANGE UP (ref 135–145)
WBC # BLD: 6.97 K/UL — SIGNIFICANT CHANGE UP (ref 3.8–10.5)
WBC # FLD AUTO: 6.97 K/UL — SIGNIFICANT CHANGE UP (ref 3.8–10.5)

## 2023-01-23 PROCEDURE — 99233 SBSQ HOSP IP/OBS HIGH 50: CPT

## 2023-01-23 PROCEDURE — 93970 EXTREMITY STUDY: CPT | Mod: 26

## 2023-01-23 RX ORDER — ENOXAPARIN SODIUM 100 MG/ML
60 INJECTION SUBCUTANEOUS EVERY 12 HOURS
Refills: 0 | Status: DISCONTINUED | OUTPATIENT
Start: 2023-01-23 | End: 2023-01-26

## 2023-01-23 RX ORDER — PANTOPRAZOLE SODIUM 20 MG/1
40 TABLET, DELAYED RELEASE ORAL
Refills: 0 | Status: DISCONTINUED | OUTPATIENT
Start: 2023-01-23 | End: 2023-01-27

## 2023-01-23 RX ORDER — CLONAZEPAM 1 MG
0.5 TABLET ORAL AT BEDTIME
Refills: 0 | Status: DISCONTINUED | OUTPATIENT
Start: 2023-01-23 | End: 2023-01-27

## 2023-01-23 RX ADMIN — ENOXAPARIN SODIUM 40 MILLIGRAM(S): 100 INJECTION SUBCUTANEOUS at 06:38

## 2023-01-23 RX ADMIN — POLYETHYLENE GLYCOL 3350 17 GRAM(S): 17 POWDER, FOR SOLUTION ORAL at 11:44

## 2023-01-23 RX ADMIN — ENOXAPARIN SODIUM 60 MILLIGRAM(S): 100 INJECTION SUBCUTANEOUS at 17:19

## 2023-01-23 RX ADMIN — DIVALPROEX SODIUM 500 MILLIGRAM(S): 500 TABLET, DELAYED RELEASE ORAL at 21:43

## 2023-01-23 RX ADMIN — Medication 650 MILLIGRAM(S): at 12:05

## 2023-01-23 RX ADMIN — Medication 100 MILLIGRAM(S): at 21:47

## 2023-01-23 RX ADMIN — Medication 2000 UNIT(S): at 11:43

## 2023-01-23 RX ADMIN — Medication 650 MILLIGRAM(S): at 11:45

## 2023-01-23 RX ADMIN — ZINC SULFATE TAB 220 MG (50 MG ZINC EQUIVALENT) 220 MILLIGRAM(S): 220 (50 ZN) TAB at 11:43

## 2023-01-23 RX ADMIN — GABAPENTIN 300 MILLIGRAM(S): 400 CAPSULE ORAL at 06:38

## 2023-01-23 RX ADMIN — DONEPEZIL HYDROCHLORIDE 5 MILLIGRAM(S): 10 TABLET, FILM COATED ORAL at 22:23

## 2023-01-23 RX ADMIN — GABAPENTIN 300 MILLIGRAM(S): 400 CAPSULE ORAL at 17:19

## 2023-01-23 RX ADMIN — QUETIAPINE FUMARATE 250 MILLIGRAM(S): 200 TABLET, FILM COATED ORAL at 21:50

## 2023-01-23 RX ADMIN — REMDESIVIR 200 MILLIGRAM(S): 5 INJECTION INTRAVENOUS at 08:24

## 2023-01-23 RX ADMIN — PALIPERIDONE 6 MILLIGRAM(S): 1.5 TABLET, EXTENDED RELEASE ORAL at 11:44

## 2023-01-23 NOTE — PROGRESS NOTE ADULT - NS ATTEND AMEND GEN_ALL_CORE FT
Seen and examined. Chart reviewed.   patient is improving clinically.   Agree with above a/p  Edited where ever is necessary    reported b/l buttock pain going down to b/l LE. h/o pinched nerve  Duplex neg for DVT. showed b/l baker;s cyst. needs ortho f/u OP  no complaints. walked with PT> o2 sat 98% on ambulation on RA. no skilled PT Need.   c/w current rx  dispo: likely on Jan 25 as [facility] not accept until 5 days of covid+ Seen and examined. Chart reviewed.   patient is improving clinically.   Agree with above a/p  Edited where ever is necessary    reported b/l buttock pain going down to b/l LE. h/o pinched nerve  Duplex neg for DVT. showed b/l baker;s cyst. needs ortho f/u OP  no complaints. walked with PT> o2 sat 98% on ambulation on RA. no skilled PT Need.   c/w current rx  dispo: likely on Jan 26 as [facility] not accept until 5 days of covid+ Seen and examined. Chart reviewed.   patient is improving clinically.   Agree with above a/p  Edited where ever is necessary    reported b/l buttock pain going down to b/l LE. h/o pinched nerve  Duplex neg for DVT. showed b/l baker;s cyst. needs ortho f/u OP  no complaints. walked with PT> o2 sat 98% on ambulation on RA. no skilled PT Need.   c/w current rx  pharmacy suggested to increase lovenox to full dose as ddimer >500 and covid+  h/h slightly dropped compared to previous. monitor  low albumin  encouraged PO  nutrition eval    dispo: likely on Jan 26 as [facility] not accept until 5 days of covid+

## 2023-01-23 NOTE — PROGRESS NOTE ADULT - SUBJECTIVE AND OBJECTIVE BOX
Patient is a 77y old  Female who presents with a chief complaint of weakness, cough (2023 11:56)      Patient seen and examined at bedside. No overnight events reported. Patient states she feels better than when she first came in. Denies SOB, chest pain, nausea, vomiting.     ALLERGIES:  No Known Allergies    MEDICATIONS  (STANDING):  cholecalciferol 2000 Unit(s) Oral daily  clonazePAM  Tablet 0.5 milliGRAM(s) Oral at bedtime  divalproex  milliGRAM(s) Oral at bedtime  donepezil 5 milliGRAM(s) Oral at bedtime  enoxaparin Injectable 40 milliGRAM(s) SubCutaneous every 24 hours  gabapentin 300 milliGRAM(s) Oral two times a day  influenza  Vaccine (HIGH DOSE) 0.7 milliLiter(s) IntraMuscular once  paliperidone ER. 6 milliGRAM(s) Oral daily  polyethylene glycol 3350 17 Gram(s) Oral daily  QUEtiapine 250 milliGRAM(s) Oral at bedtime  remdesivir  IVPB 100 milliGRAM(s) IV Intermittent every 24 hours  remdesivir  IVPB   IV Intermittent   traZODone 100 milliGRAM(s) Oral at bedtime  zinc sulfate 220 milliGRAM(s) Oral daily    MEDICATIONS  (PRN):  acetaminophen     Tablet .. 650 milliGRAM(s) Oral every 6 hours PRN Temp greater or equal to 38C (100.4F), Mild Pain (1 - 3)  aluminum hydroxide/magnesium hydroxide/simethicone Suspension 30 milliLiter(s) Oral every 4 hours PRN Dyspepsia  melatonin 3 milliGRAM(s) Oral at bedtime PRN Insomnia  ondansetron Injectable 4 milliGRAM(s) IV Push every 8 hours PRN Nausea and/or Vomiting    Vital Signs Last 24 Hrs  T(F): 99.2 (2023 05:41), Max: 99.5 (2023 20:27)  HR: 88 (2023 05:41) (85 - 89)  BP: 121/53 (2023 05:41) (103/50 - 121/61)  RR: 18 (2023 05:41) (17 - 18)  SpO2: 95% (2023 05:41) (95% - 99%)  I&O's Summary    PHYSICAL EXAM:  General: NAD, well developed, alert and awake  ENT: No gross hearing impairment, Moist mucous membranes, no thrush  Neck: Supple, No JVD  Lungs: Clear to auscultation bilaterally, good air entry, non-labored breathing  Cardio: RRR, S1/S2, No murmur  Abdomen: Soft, Nontender, Nondistended; Bowel sounds present  Extremities: No calf tenderness, No cyanosis, No pitting edema  Psych: Appropriate mood and affect    LABS:                        11.2   6.97  )-----------( 225      ( 2023 06:47 )             34.4         138  |  101  |  25  ----------------------------<  103  4.2   |  26  |  1.02    Ca    8.6      2023 06:47    TPro  6.3  /  Alb  3.0  /  TBili  0.3  /  DBili  0.0  /  AST  21  /  ALT  13  /  AlkPhos  78            PT/INR - ( 2023 06:47 )   PT: 12.8 sec;   INR: 1.10 ratio               CARDIAC MARKERS ( 2023 06:10 )  x     / 6.0 ng/L / x     / x     / x                            Urinalysis Basic - ( 2023 22:15 )    Color: Yellow / Appearance: Clear / S.015 / pH: x  Gluc: x / Ketone: Negative  / Bili: Negative / Urobili: Negative   Blood: x / Protein: Negative / Nitrite: Negative   Leuk Esterase: Negative / RBC: x / WBC x   Sq Epi: x / Non Sq Epi: x / Bacteria: x        Culture - Urine (collected 2023 22:15)  Source: Clean Catch Clean Catch (Midstream)  Final Report (2023 18:08):    <10,000 CFU/mL Normal Urogenital Desiree      COVID-19 PCR: Detected (23 @ 22:00)    RADIOLOGY & ADDITIONAL TESTS:    Care Discussed with Consultants/Other Providers:    Patient is a 77y old  Female who presents with a chief complaint of weakness, cough (2023 11:56)      Patient seen and examined at bedside. No overnight events reported. Patient states she feels better than when she first came in. States she is having some pain in her thighs. Denies SOB, chest pain, nausea, vomiting.     ALLERGIES:  No Known Allergies    MEDICATIONS  (STANDING):  cholecalciferol 2000 Unit(s) Oral daily  clonazePAM  Tablet 0.5 milliGRAM(s) Oral at bedtime  divalproex  milliGRAM(s) Oral at bedtime  donepezil 5 milliGRAM(s) Oral at bedtime  enoxaparin Injectable 40 milliGRAM(s) SubCutaneous every 24 hours  gabapentin 300 milliGRAM(s) Oral two times a day  influenza  Vaccine (HIGH DOSE) 0.7 milliLiter(s) IntraMuscular once  paliperidone ER. 6 milliGRAM(s) Oral daily  polyethylene glycol 3350 17 Gram(s) Oral daily  QUEtiapine 250 milliGRAM(s) Oral at bedtime  remdesivir  IVPB 100 milliGRAM(s) IV Intermittent every 24 hours  remdesivir  IVPB   IV Intermittent   traZODone 100 milliGRAM(s) Oral at bedtime  zinc sulfate 220 milliGRAM(s) Oral daily    MEDICATIONS  (PRN):  acetaminophen     Tablet .. 650 milliGRAM(s) Oral every 6 hours PRN Temp greater or equal to 38C (100.4F), Mild Pain (1 - 3)  aluminum hydroxide/magnesium hydroxide/simethicone Suspension 30 milliLiter(s) Oral every 4 hours PRN Dyspepsia  melatonin 3 milliGRAM(s) Oral at bedtime PRN Insomnia  ondansetron Injectable 4 milliGRAM(s) IV Push every 8 hours PRN Nausea and/or Vomiting    Vital Signs Last 24 Hrs  T(F): 99.2 (2023 05:41), Max: 99.5 (2023 20:27)  HR: 88 (2023 05:41) (85 - 89)  BP: 121/53 (2023 05:41) (103/50 - 121/61)  RR: 18 (2023 05:41) (17 - 18)  SpO2: 95% (2023 05:41) (95% - 99%)  I&O's Summary    PHYSICAL EXAM:  General: NAD, well developed, alert and awake  ENT: No gross hearing impairment, Moist mucous membranes, no thrush  Neck: Supple, No JVD  Lungs: Clear to auscultation bilaterally, good air entry, non-labored breathing  Cardio: RRR, S1/S2, No murmur  Abdomen: Soft, Nontender, Nondistended; Bowel sounds present  Extremities: No calf tenderness, No cyanosis, No pitting edema  Psych: Appropriate mood and affect    LABS:                        11.2   6.97  )-----------( 225      ( 2023 06:47 )             34.4         138  |  101  |  25  ----------------------------<  103  4.2   |  26  |  1.02    Ca    8.6      2023 06:47    TPro  6.3  /  Alb  3.0  /  TBili  0.3  /  DBili  0.0  /  AST  21  /  ALT  13  /  AlkPhos  78            PT/INR - ( 2023 06:47 )   PT: 12.8 sec;   INR: 1.10 ratio               CARDIAC MARKERS ( 2023 06:10 )  x     / 6.0 ng/L / x     / x     / x                            Urinalysis Basic - ( 2023 22:15 )    Color: Yellow / Appearance: Clear / S.015 / pH: x  Gluc: x / Ketone: Negative  / Bili: Negative / Urobili: Negative   Blood: x / Protein: Negative / Nitrite: Negative   Leuk Esterase: Negative / RBC: x / WBC x   Sq Epi: x / Non Sq Epi: x / Bacteria: x        Culture - Urine (collected 2023 22:15)  Source: Clean Catch Clean Catch (Midstream)  Final Report (2023 18:08):    <10,000 CFU/mL Normal Urogenital Desiree      COVID-19 PCR: Detected (23 @ 22:00)    RADIOLOGY & ADDITIONAL TESTS:    Care Discussed with Consultants/Other Providers:    Patient is a 77y old  Female who presents with a chief complaint of weakness, cough (2023 11:56)      Patient seen and examined at bedside. No overnight events reported. Patient states she feels better than when she first came in. States she is having some pain in her thighs. Denies SOB, chest pain, nausea, vomiting.     ALLERGIES:  No Known Allergies    MEDICATIONS  (STANDING):  cholecalciferol 2000 Unit(s) Oral daily  clonazePAM  Tablet 0.5 milliGRAM(s) Oral at bedtime  divalproex  milliGRAM(s) Oral at bedtime  donepezil 5 milliGRAM(s) Oral at bedtime  enoxaparin Injectable 40 milliGRAM(s) SubCutaneous every 24 hours  gabapentin 300 milliGRAM(s) Oral two times a day  influenza  Vaccine (HIGH DOSE) 0.7 milliLiter(s) IntraMuscular once  paliperidone ER. 6 milliGRAM(s) Oral daily  polyethylene glycol 3350 17 Gram(s) Oral daily  QUEtiapine 250 milliGRAM(s) Oral at bedtime  remdesivir  IVPB 100 milliGRAM(s) IV Intermittent every 24 hours  remdesivir  IVPB   IV Intermittent   traZODone 100 milliGRAM(s) Oral at bedtime  zinc sulfate 220 milliGRAM(s) Oral daily    MEDICATIONS  (PRN):  acetaminophen     Tablet .. 650 milliGRAM(s) Oral every 6 hours PRN Temp greater or equal to 38C (100.4F), Mild Pain (1 - 3)  aluminum hydroxide/magnesium hydroxide/simethicone Suspension 30 milliLiter(s) Oral every 4 hours PRN Dyspepsia  melatonin 3 milliGRAM(s) Oral at bedtime PRN Insomnia  ondansetron Injectable 4 milliGRAM(s) IV Push every 8 hours PRN Nausea and/or Vomiting    Vital Signs Last 24 Hrs  T(F): 99.2 (2023 05:41), Max: 99.5 (2023 20:27)  HR: 88 (2023 05:41) (85 - 89)  BP: 121/53 (2023 05:41) (103/50 - 121/61)  RR: 18 (2023 05:41) (17 - 18)  SpO2: 95% (2023 05:41) (95% - 99%)  I&O's Summary    PHYSICAL EXAM:  General: NAD, well developed, alert and awake  ENT: No gross hearing impairment, Moist mucous membranes, no thrush  Neck: Supple, No JVD  Lungs: Clear to auscultation bilaterally, good air entry, non-labored breathing  Cardio: RRR, S1/S2, No murmur  Abdomen: Soft, Nontender, Nondistended; Bowel sounds present  Extremities: No calf tenderness, No cyanosis, No pitting edema  Psych: Appropriate mood and affect    LABS:                        11.2   6.97  )-----------( 225      ( 2023 06:47 )             34.4         138  |  101  |  25  ----------------------------<  103  4.2   |  26  |  1.02    Ca    8.6      2023 06:47    TPro  6.3  /  Alb  3.0  /  TBili  0.3  /  DBili  0.0  /  AST  21  /  ALT  13  /  AlkPhos  78            PT/INR - ( 2023 06:47 )   PT: 12.8 sec;   INR: 1.10 ratio               CARDIAC MARKERS ( 2023 06:10 )  x     / 6.0 ng/L / x     / x     / x                            Urinalysis Basic - ( 2023 22:15 )    Color: Yellow / Appearance: Clear / S.015 / pH: x  Gluc: x / Ketone: Negative  / Bili: Negative / Urobili: Negative   Blood: x / Protein: Negative / Nitrite: Negative   Leuk Esterase: Negative / RBC: x / WBC x   Sq Epi: x / Non Sq Epi: x / Bacteria: x        Culture - Urine (collected 2023 22:15)  Source: Clean Catch Clean Catch (Midstream)  Final Report (2023 18:08):    <10,000 CFU/mL Normal Urogenital Desiree      COVID-19 PCR: Detected (23 @ 22:00)    RADIOLOGY & ADDITIONAL TESTS:< from: US Duplex Venous Lower Ext Complete, Bilateral (23 @ 14:03) >    IMPRESSION:  No evidence of deep venous thrombosis in either lower extremity.  The calf veins were not imaged as per Covid protocol.    Bilateral complex popliteal Baker's cysts.    < end of copied text >      Care Discussed with Consultants/Other Providers:

## 2023-01-23 NOTE — PHYSICAL THERAPY INITIAL EVALUATION ADULT - PERTINENT HX OF CURRENT PROBLEM, REHAB EVAL
pt is a 75 yr old female with mild dementia, from [facility] presents with cough, weakness, lethargy, unsteady gait, incontinent of urine x 2 days unable to perform ADLs (baseline independent) found with Covid 19

## 2023-01-23 NOTE — PROGRESS NOTE ADULT - ASSESSMENT
75 female with mild dementia, from [facility] presents with cough, weakness, lethargy, unsteady gait, incontinent of urine x 2 days unable to perform ADLs (baseline independent) found with Covid 19.    Acute Covid 19 Infection  Functional Decline likely due to above  Covid PCR positive on 1/20/23, D Dimer 544  CT head in ED negative  Continue remdesivir (day 3)  Not hypoxic, stable respiratory status on room air  Supportive care  Airborne Precautions  Fall Precautions  PT evaluation    Anxiety  Continue klonopin    Depression  Continue depakote, neurontin, paliperidone, seroquel    Insomnia  Continue trazodone    Dementia  Continue Aricept    Prophylactic Measure  lovenox    spoke to and provided an update for micheal crandall [phone], all questions answered 75 female with mild dementia, from [facility] presents with cough, weakness, lethargy, unsteady gait, incontinent of urine x 2 days unable to perform ADLs (baseline independent) found with Covid 19.    Acute Covid 19 Infection  Functional Decline likely due to above  Covid PCR positive on 1/20/23, D Dimer 544  CT head in ED negative  Continue remdesivir (day 3)  Not hypoxic, stable respiratory status on room air and with ambulation  Supportive care  Airborne Precautions  Fall Precautions  PT evaluation  F/U Doppler b/l LE as patient complaining of thigh pain and elevated D-dimer    Anxiety  Continue klonopin    Depression  Continue depakote, neurontin, paliperidone, seroquel    Insomnia  Continue trazodone    Dementia  Continue Aricept    Prophylactic Measure  lovenox    spoke to and provided an update for micheal crandall [phone], all questions answered 75 female with mild dementia, from [facility] presents with cough, weakness, lethargy, unsteady gait, incontinent of urine x 2 days unable to perform ADLs (baseline independent) found with Covid 19.    Acute Covid 19 Infection  Functional Decline likely due to above  Covid PCR positive on 1/20/23, D Dimer 544  CT head in ED negative  Continue remdesivir (day 2)  Not hypoxic, stable respiratory status on room air and with ambulation  Supportive care  Airborne Precautions  Fall Precautions  PT evaluation  F/U Doppler b/l LE as patient complaining of thigh pain and elevated D-dimer    Anxiety  Continue klonopin    Depression  Continue depakote, neurontin, paliperidone, seroquel    Insomnia  Continue trazodone    Dementia  Continue Aricept    Prophylactic Measure  lovenox    spoke to and provided an update for micheal crandall [phone], all questions answered 75 female with mild dementia, from [facility] presents with cough, weakness, lethargy, unsteady gait, incontinent of urine x 2 days unable to perform ADLs (baseline independent) found with Covid 19.    Acute Covid 19 Infection  Functional Decline likely due to above  Covid PCR positive on 1/20/23, D Dimer 544  CT head in ED negative  Continue remdesivir (day 2)  Not hypoxic, stable respiratory status on room air and with ambulation  Supportive care  Airborne Precautions  Fall Precautions  PT evaluation  F/U Doppler b/l LE as patient complaining of thigh pain and elevated D-dimer    Anxiety  Continue klonopin    Depression  Continue depakote, neurontin, paliperidone, seroquel    Insomnia  Continue trazodone    Dementia  Continue Aricept    Prophylactic Measure  lovenox    Needs 5 days of isolation before returning to [place]    Called to provide an update for micheal crandall [phone], voicemail box full 75 female with mild dementia, from [facility] presents with cough, weakness, lethargy, unsteady gait, incontinent of urine x 2 days unable to perform ADLs (baseline independent) found with Covid 19.    Acute Covid 19 Infection  Functional Decline likely due to above  Covid PCR positive on 1/20/23, D Dimer 544  CT head in ED negative  Continue remdesivir (day 2)  Not hypoxic, stable respiratory status on room air and with ambulation  Supportive care  Airborne Precautions  Fall Precautions  PT evaluation  Doppler b/l LE negative for DVT, b/l complex baker cysts    Anxiety  Continue klonopin prn    Depression  Continue depakote, neurontin, paliperidone, seroquel    Insomnia  Continue trazodone    Dementia  Continue Aricept    Prophylactic Measure  lovenox    Needs 5 days of isolation before returning to [facility]    Called to provide an update for micheal crandall [phone], voicemail box full

## 2023-01-24 LAB
ALBUMIN SERPL ELPH-MCNC: 3.3 G/DL — SIGNIFICANT CHANGE UP (ref 3.3–5)
ALP SERPL-CCNC: 72 U/L — SIGNIFICANT CHANGE UP (ref 40–120)
ALT FLD-CCNC: 7 U/L — LOW (ref 10–45)
AST SERPL-CCNC: 12 U/L — SIGNIFICANT CHANGE UP (ref 10–40)
BILIRUB DIRECT SERPL-MCNC: 0.1 MG/DL — SIGNIFICANT CHANGE UP (ref 0–0.3)
BILIRUB INDIRECT FLD-MCNC: 0.2 MG/DL — SIGNIFICANT CHANGE UP (ref 0.2–1)
BILIRUB SERPL-MCNC: 0.3 MG/DL — SIGNIFICANT CHANGE UP (ref 0.2–1.2)
CREAT SERPL-MCNC: 0.74 MG/DL — SIGNIFICANT CHANGE UP (ref 0.5–1.3)
CRP SERPL-MCNC: 50 MG/L — HIGH
D DIMER BLD IA.RAPID-MCNC: 346 NG/ML DDU — HIGH
EGFR: 84 ML/MIN/1.73M2 — SIGNIFICANT CHANGE UP
FERRITIN SERPL-MCNC: 62 NG/ML — SIGNIFICANT CHANGE UP (ref 15–150)
HCT VFR BLD CALC: 32.8 % — LOW (ref 34.5–45)
HGB BLD-MCNC: 10.8 G/DL — LOW (ref 11.5–15.5)
INR BLD: 1.1 RATIO — SIGNIFICANT CHANGE UP (ref 0.88–1.16)
LDH SERPL L TO P-CCNC: 220 U/L — SIGNIFICANT CHANGE UP (ref 50–242)
MCHC RBC-ENTMCNC: 30.6 PG — SIGNIFICANT CHANGE UP (ref 27–34)
MCHC RBC-ENTMCNC: 32.9 GM/DL — SIGNIFICANT CHANGE UP (ref 32–36)
MCV RBC AUTO: 92.9 FL — SIGNIFICANT CHANGE UP (ref 80–100)
NRBC # BLD: 0 /100 WBCS — SIGNIFICANT CHANGE UP (ref 0–0)
PLATELET # BLD AUTO: 243 K/UL — SIGNIFICANT CHANGE UP (ref 150–400)
PROT SERPL-MCNC: 6.2 G/DL — SIGNIFICANT CHANGE UP (ref 6–8.3)
PROTHROM AB SERPL-ACNC: 12.8 SEC — SIGNIFICANT CHANGE UP (ref 10.5–13.4)
RBC # BLD: 3.53 M/UL — LOW (ref 3.8–5.2)
RBC # FLD: 13.2 % — SIGNIFICANT CHANGE UP (ref 10.3–14.5)
WBC # BLD: 7.31 K/UL — SIGNIFICANT CHANGE UP (ref 3.8–10.5)
WBC # FLD AUTO: 7.31 K/UL — SIGNIFICANT CHANGE UP (ref 3.8–10.5)

## 2023-01-24 PROCEDURE — 99232 SBSQ HOSP IP/OBS MODERATE 35: CPT

## 2023-01-24 RX ADMIN — POLYETHYLENE GLYCOL 3350 17 GRAM(S): 17 POWDER, FOR SOLUTION ORAL at 12:24

## 2023-01-24 RX ADMIN — PANTOPRAZOLE SODIUM 40 MILLIGRAM(S): 20 TABLET, DELAYED RELEASE ORAL at 06:36

## 2023-01-24 RX ADMIN — REMDESIVIR 200 MILLIGRAM(S): 5 INJECTION INTRAVENOUS at 12:21

## 2023-01-24 RX ADMIN — ENOXAPARIN SODIUM 60 MILLIGRAM(S): 100 INJECTION SUBCUTANEOUS at 18:13

## 2023-01-24 RX ADMIN — PALIPERIDONE 6 MILLIGRAM(S): 1.5 TABLET, EXTENDED RELEASE ORAL at 14:27

## 2023-01-24 RX ADMIN — QUETIAPINE FUMARATE 250 MILLIGRAM(S): 200 TABLET, FILM COATED ORAL at 20:43

## 2023-01-24 RX ADMIN — ZINC SULFATE TAB 220 MG (50 MG ZINC EQUIVALENT) 220 MILLIGRAM(S): 220 (50 ZN) TAB at 12:24

## 2023-01-24 RX ADMIN — DIVALPROEX SODIUM 500 MILLIGRAM(S): 500 TABLET, DELAYED RELEASE ORAL at 20:43

## 2023-01-24 RX ADMIN — GABAPENTIN 300 MILLIGRAM(S): 400 CAPSULE ORAL at 18:12

## 2023-01-24 RX ADMIN — Medication 100 MILLIGRAM(S): at 20:43

## 2023-01-24 RX ADMIN — DONEPEZIL HYDROCHLORIDE 5 MILLIGRAM(S): 10 TABLET, FILM COATED ORAL at 20:43

## 2023-01-24 RX ADMIN — Medication 2000 UNIT(S): at 12:24

## 2023-01-24 RX ADMIN — GABAPENTIN 300 MILLIGRAM(S): 400 CAPSULE ORAL at 06:35

## 2023-01-24 RX ADMIN — ENOXAPARIN SODIUM 60 MILLIGRAM(S): 100 INJECTION SUBCUTANEOUS at 06:36

## 2023-01-24 NOTE — DIETITIAN INITIAL EVALUATION ADULT - OTHER INFO
Patient with fair-adequate oral intakes at this time, consuming % of meals per nursing flow sheets. Patient reports good appetite at this time and enjoys meals she is receiving. Last BM noted on 1/22/23 however patient c/o some issues with constipation. Patient receptive to receiving additional food items that are high in fiber to promote BM regularity (updated and noted in patient diet kardex). No issues with chewing or swallowing. Skin intact. No edema noted. Education provided on DASH/TLC diet.

## 2023-01-24 NOTE — DIETITIAN INITIAL EVALUATION ADULT - PERTINENT MEDS FT
MEDICATIONS  (STANDING):  cholecalciferol 2000 Unit(s) Oral daily  divalproex  milliGRAM(s) Oral at bedtime  donepezil 5 milliGRAM(s) Oral at bedtime  enoxaparin Injectable 60 milliGRAM(s) SubCutaneous every 12 hours  gabapentin 300 milliGRAM(s) Oral two times a day  influenza  Vaccine (HIGH DOSE) 0.7 milliLiter(s) IntraMuscular once  paliperidone ER. 6 milliGRAM(s) Oral daily  pantoprazole    Tablet 40 milliGRAM(s) Oral before breakfast  polyethylene glycol 3350 17 Gram(s) Oral daily  QUEtiapine 250 milliGRAM(s) Oral at bedtime  remdesivir  IVPB   IV Intermittent   remdesivir  IVPB 100 milliGRAM(s) IV Intermittent every 24 hours  traZODone 100 milliGRAM(s) Oral at bedtime  zinc sulfate 220 milliGRAM(s) Oral daily    MEDICATIONS  (PRN):  acetaminophen     Tablet .. 650 milliGRAM(s) Oral every 6 hours PRN Temp greater or equal to 38C (100.4F), Mild Pain (1 - 3)  aluminum hydroxide/magnesium hydroxide/simethicone Suspension 30 milliLiter(s) Oral every 4 hours PRN Dyspepsia  clonazePAM  Tablet 0.5 milliGRAM(s) Oral at bedtime PRN anxiety  melatonin 3 milliGRAM(s) Oral at bedtime PRN Insomnia  ondansetron Injectable 4 milliGRAM(s) IV Push every 8 hours PRN Nausea and/or Vomiting

## 2023-01-24 NOTE — DIETITIAN INITIAL EVALUATION ADULT - REASON
Rituxan Counseling:  I discussed with the patient the risks of Rituxan infusions. Side effects can include infusion reactions, severe drug rashes including mucocutaneous reactions, reactivation of latent hepatitis and other infections and rarely progressive multifocal leukoencephalopathy.  All of the patient's questions and concerns were addressed. Nutrition physical assessment not warranted - no overt visual signs of muscle depletion or subcutaneous fat loss

## 2023-01-24 NOTE — DIETITIAN INITIAL EVALUATION ADULT - ADD RECOMMEND
called and scheduled a post op appt   1. Recommend continue current nutrition plan of care as tolerated and honor patients daily food preferences as feasible with prescribed diet   2. Provide ongoing diet education as needed

## 2023-01-24 NOTE — PROGRESS NOTE ADULT - NS ATTEND AMEND GEN_ALL_CORE FT
Seen and examined. Chart reviewed.   patient is improving clinically.   Agree with above a/p  Edited where ever is necessary    reported b/l buttock pain going down to b/l LE. h/o pinched nerve  Duplex neg for DVT. showed b/l baker;s cyst. needs ortho f/u OP  no complaints. walked with PT> o2 sat 98% on ambulation on RA. no skilled PT Need.   c/w current rx  pharmacy suggested to increase lovenox to full dose as ddimer >500 and covid+  h/h slightly dropped compared to previous. monitor  low albumin  encouraged PO  nutrition eval    dispo: likely on Jan 26 as [facility] not accept until 5 days of covid+.

## 2023-01-24 NOTE — DIETITIAN INITIAL EVALUATION ADULT - ORAL INTAKE PTA/DIET HISTORY
Patient reports good appetite prior to admission. Did not follow any therapeutic diet. No known food allergies/intolerances or dietary restrictions. UBW reported to be about 145 lbs.

## 2023-01-24 NOTE — CDI QUERY NOTE - NSCDIOTHERTXTBX_GEN_ALL_CORE_HH
Presents with  cough, weakness, lethargy, unsteady gait, incontinent of urine x 2 days unable to perform ADLs (baseline independent) found with Covid 19.    H&P - encephalopathy, acute change in mental status, lethargy, cough, severe functional decline  likely de to covid 19 infection.    Please clarify specificity of Encephalopathy    - Toxic metabolic encephalopathy due to Covid infection  - Metabolic encephalopathy due to Covid infection  - Other(specify)    Thank you

## 2023-01-24 NOTE — DIETITIAN INITIAL EVALUATION ADULT - PERTINENT LABORATORY DATA
01-24    x   |  x   |  x   ----------------------------<  x   x    |  x   |  0.74    Ca    8.6      23 Jan 2023 06:47    TPro  6.2  /  Alb  3.3  /  TBili  0.3  /  DBili  0.1  /  AST  12  /  ALT  7<L>  /  AlkPhos  72  01-24

## 2023-01-24 NOTE — PROGRESS NOTE ADULT - SUBJECTIVE AND OBJECTIVE BOX
Patient is a 77y old  Female who presents with a chief complaint of weakness, cough (23 Jan 2023 09:27)      Patient seen and examined at bedside. No overnight events reported. Patient states she feels well this morning.     ALLERGIES:  No Known Allergies    MEDICATIONS  (STANDING):  cholecalciferol 2000 Unit(s) Oral daily  divalproex  milliGRAM(s) Oral at bedtime  donepezil 5 milliGRAM(s) Oral at bedtime  enoxaparin Injectable 60 milliGRAM(s) SubCutaneous every 12 hours  gabapentin 300 milliGRAM(s) Oral two times a day  influenza  Vaccine (HIGH DOSE) 0.7 milliLiter(s) IntraMuscular once  paliperidone ER. 6 milliGRAM(s) Oral daily  pantoprazole    Tablet 40 milliGRAM(s) Oral before breakfast  polyethylene glycol 3350 17 Gram(s) Oral daily  QUEtiapine 250 milliGRAM(s) Oral at bedtime  remdesivir  IVPB 100 milliGRAM(s) IV Intermittent every 24 hours  remdesivir  IVPB   IV Intermittent   traZODone 100 milliGRAM(s) Oral at bedtime  zinc sulfate 220 milliGRAM(s) Oral daily    MEDICATIONS  (PRN):  acetaminophen     Tablet .. 650 milliGRAM(s) Oral every 6 hours PRN Temp greater or equal to 38C (100.4F), Mild Pain (1 - 3)  aluminum hydroxide/magnesium hydroxide/simethicone Suspension 30 milliLiter(s) Oral every 4 hours PRN Dyspepsia  clonazePAM  Tablet 0.5 milliGRAM(s) Oral at bedtime PRN anxiety  melatonin 3 milliGRAM(s) Oral at bedtime PRN Insomnia  ondansetron Injectable 4 milliGRAM(s) IV Push every 8 hours PRN Nausea and/or Vomiting    Vital Signs Last 24 Hrs  T(F): 98.3 (24 Jan 2023 05:43), Max: 98.4 (23 Jan 2023 20:21)  HR: 85 (24 Jan 2023 05:43) (78 - 94)  BP: 119/64 (24 Jan 2023 05:43) (96/58 - 139/74)  RR: 18 (24 Jan 2023 05:43) (17 - 18)  SpO2: 95% (24 Jan 2023 05:43) (95% - 98%)  I&O's Summary    23 Jan 2023 07:01 - 24 Jan 2023 07:00  --------------------------------------------------------  IN: 820 mL / OUT: 0 mL / NET: 820 mL      PHYSICAL EXAM:  General: NAD, Alert, walking around her room  ENT: No gross hearing impairment, Moist mucous membranes, no thrush  Neck: Supple, No JVD  Lungs: Clear to auscultation bilaterally, good air entry, non-labored breathing  Cardio: RRR, S1/S2, No murmur  Abdomen: Soft, Nontender, Nondistended; Bowel sounds present  Extremities: No calf tenderness, No cyanosis, No pitting edema  Psych: Appropriate mood and affect    LABS:                        10.8   7.31  )-----------( 243      ( 24 Jan 2023 07:10 )             32.8     01-24    x   |  x   |  x   ----------------------------<  x   x    |  x   |  0.74    Ca    8.6      23 Jan 2023 06:47    TPro  6.2  /  Alb  3.3  /  TBili  0.3  /  DBili  0.1  /  AST  12  /  ALT  7   /  AlkPhos  72  01-24          PT/INR - ( 24 Jan 2023 07:10 )   PT: 12.8 sec;   INR: 1.10 ratio               CARDIAC MARKERS ( 22 Jan 2023 06:10 )  x     / 6.0 ng/L / x     / x     / x                                Culture - Urine (collected 20 Jan 2023 22:15)  Source: Clean Catch Clean Catch (Midstream)  Final Report (22 Jan 2023 18:08):    <10,000 CFU/mL Normal Urogenital Desiree      COVID-19 PCR: Detected (01-20-23 @ 22:00)    RADIOLOGY & ADDITIONAL TESTS:    Care Discussed with Consultants/Other Providers:

## 2023-01-24 NOTE — PROGRESS NOTE ADULT - ASSESSMENT
75 female with mild dementia, from [facility] presents with cough, weakness, lethargy, unsteady gait, incontinent of urine x 2 days unable to perform ADLs (baseline independent) found with Covid 19.    Acute Covid 19 Infection  Functional Decline likely due to above  Covid PCR positive on 1/20/23, D Dimer 544  CT head in ED negative  Continue remdesivir (day 3)  Not hypoxic, stable respiratory status on room air and with ambulation  Supportive care  Airborne Precautions  Fall Precautions  PT evaluation-no PT needs  Doppler b/l LE negative for DVT, b/l complex baker cysts    Anxiety  Continue klonopin prn    Depression  Continue depakote, neurontin, paliperidone, seroquel    Insomnia  Continue trazodone    Dementia  Continue Aricept    Prophylactic Measure  lovenox    Needs 5 days of isolation before returning to [facility]    Called to provide an update for micheal crandall [phone], voicemail box full 75 female with mild dementia, from [facility] presents with cough, weakness, lethargy, unsteady gait, incontinent of urine x 2 days unable to perform ADLs (baseline independent) found with Covid 19.    Acute Covid 19 Infection  metabolic encephalopathy likely due to Covid: improved  Functional Decline likely due to above  Covid PCR positive on 1/20/23, D Dimer 544  CT head in ED negative  Continue remdesivir (day 3)  Not hypoxic, stable respiratory status on room air and with ambulation  Supportive care  Airborne Precautions  Fall Precautions  PT evaluation-no PT needs  Doppler b/l LE negative for DVT, b/l complex baker cysts    Anxiety  Continue klonopin prn    Depression  Continue depakote, neurontin, paliperidone, seroquel    Insomnia  Continue trazodone    Dementia  Continue Aricept    Prophylactic Measure  lovenox    Needs 5 days of isolation before returning to [facility]    Called to provide an update for micheal crandall [phone], voicemail box full

## 2023-01-25 ENCOUNTER — TRANSCRIPTION ENCOUNTER (OUTPATIENT)
Age: 78
End: 2023-01-25

## 2023-01-25 LAB
ALBUMIN SERPL ELPH-MCNC: 3.3 G/DL — SIGNIFICANT CHANGE UP (ref 3.3–5)
ALP SERPL-CCNC: 77 U/L — SIGNIFICANT CHANGE UP (ref 40–120)
ALT FLD-CCNC: 15 U/L — SIGNIFICANT CHANGE UP (ref 10–45)
ANION GAP SERPL CALC-SCNC: 10 MMOL/L — SIGNIFICANT CHANGE UP (ref 5–17)
AST SERPL-CCNC: 14 U/L — SIGNIFICANT CHANGE UP (ref 10–40)
BILIRUB DIRECT SERPL-MCNC: 0.1 MG/DL — SIGNIFICANT CHANGE UP (ref 0–0.3)
BILIRUB INDIRECT FLD-MCNC: 0.2 MG/DL — SIGNIFICANT CHANGE UP (ref 0.2–1)
BILIRUB SERPL-MCNC: 0.3 MG/DL — SIGNIFICANT CHANGE UP (ref 0.2–1.2)
BUN SERPL-MCNC: 25 MG/DL — HIGH (ref 7–23)
CALCIUM SERPL-MCNC: 8.6 MG/DL — SIGNIFICANT CHANGE UP (ref 8.4–10.5)
CHLORIDE SERPL-SCNC: 104 MMOL/L — SIGNIFICANT CHANGE UP (ref 96–108)
CO2 SERPL-SCNC: 27 MMOL/L — SIGNIFICANT CHANGE UP (ref 22–31)
CREAT SERPL-MCNC: 0.79 MG/DL — SIGNIFICANT CHANGE UP (ref 0.5–1.3)
CREAT SERPL-MCNC: 0.89 MG/DL — SIGNIFICANT CHANGE UP (ref 0.5–1.3)
EGFR: 67 ML/MIN/1.73M2 — SIGNIFICANT CHANGE UP
EGFR: 78 ML/MIN/1.73M2 — SIGNIFICANT CHANGE UP
GLUCOSE SERPL-MCNC: 99 MG/DL — SIGNIFICANT CHANGE UP (ref 70–99)
HCT VFR BLD CALC: 32.5 % — LOW (ref 34.5–45)
HGB BLD-MCNC: 10.5 G/DL — LOW (ref 11.5–15.5)
INR BLD: 1.09 RATIO — SIGNIFICANT CHANGE UP (ref 0.88–1.16)
MCHC RBC-ENTMCNC: 30.6 PG — SIGNIFICANT CHANGE UP (ref 27–34)
MCHC RBC-ENTMCNC: 32.3 GM/DL — SIGNIFICANT CHANGE UP (ref 32–36)
MCV RBC AUTO: 94.8 FL — SIGNIFICANT CHANGE UP (ref 80–100)
NRBC # BLD: 0 /100 WBCS — SIGNIFICANT CHANGE UP (ref 0–0)
PLATELET # BLD AUTO: 253 K/UL — SIGNIFICANT CHANGE UP (ref 150–400)
POTASSIUM SERPL-MCNC: 4.2 MMOL/L — SIGNIFICANT CHANGE UP (ref 3.5–5.3)
POTASSIUM SERPL-SCNC: 4.2 MMOL/L — SIGNIFICANT CHANGE UP (ref 3.5–5.3)
PROT SERPL-MCNC: 6.2 G/DL — SIGNIFICANT CHANGE UP (ref 6–8.3)
PROTHROM AB SERPL-ACNC: 12.7 SEC — SIGNIFICANT CHANGE UP (ref 10.5–13.4)
RBC # BLD: 3.43 M/UL — LOW (ref 3.8–5.2)
RBC # FLD: 13.1 % — SIGNIFICANT CHANGE UP (ref 10.3–14.5)
SARS-COV-2 RNA SPEC QL NAA+PROBE: DETECTED
SODIUM SERPL-SCNC: 141 MMOL/L — SIGNIFICANT CHANGE UP (ref 135–145)
WBC # BLD: 6.98 K/UL — SIGNIFICANT CHANGE UP (ref 3.8–10.5)
WBC # FLD AUTO: 6.98 K/UL — SIGNIFICANT CHANGE UP (ref 3.8–10.5)

## 2023-01-25 PROCEDURE — 99232 SBSQ HOSP IP/OBS MODERATE 35: CPT

## 2023-01-25 RX ORDER — DONEPEZIL HYDROCHLORIDE 10 MG/1
1 TABLET, FILM COATED ORAL
Qty: 0 | Refills: 0 | DISCHARGE

## 2023-01-25 RX ADMIN — QUETIAPINE FUMARATE 250 MILLIGRAM(S): 200 TABLET, FILM COATED ORAL at 21:16

## 2023-01-25 RX ADMIN — GABAPENTIN 300 MILLIGRAM(S): 400 CAPSULE ORAL at 16:36

## 2023-01-25 RX ADMIN — ENOXAPARIN SODIUM 60 MILLIGRAM(S): 100 INJECTION SUBCUTANEOUS at 16:36

## 2023-01-25 RX ADMIN — Medication 650 MILLIGRAM(S): at 16:35

## 2023-01-25 RX ADMIN — ZINC SULFATE TAB 220 MG (50 MG ZINC EQUIVALENT) 220 MILLIGRAM(S): 220 (50 ZN) TAB at 11:52

## 2023-01-25 RX ADMIN — DONEPEZIL HYDROCHLORIDE 5 MILLIGRAM(S): 10 TABLET, FILM COATED ORAL at 21:16

## 2023-01-25 RX ADMIN — PANTOPRAZOLE SODIUM 40 MILLIGRAM(S): 20 TABLET, DELAYED RELEASE ORAL at 06:14

## 2023-01-25 RX ADMIN — ENOXAPARIN SODIUM 60 MILLIGRAM(S): 100 INJECTION SUBCUTANEOUS at 06:13

## 2023-01-25 RX ADMIN — GABAPENTIN 300 MILLIGRAM(S): 400 CAPSULE ORAL at 06:13

## 2023-01-25 RX ADMIN — POLYETHYLENE GLYCOL 3350 17 GRAM(S): 17 POWDER, FOR SOLUTION ORAL at 11:52

## 2023-01-25 RX ADMIN — Medication 650 MILLIGRAM(S): at 00:00

## 2023-01-25 RX ADMIN — Medication 100 MILLIGRAM(S): at 21:16

## 2023-01-25 RX ADMIN — PALIPERIDONE 6 MILLIGRAM(S): 1.5 TABLET, EXTENDED RELEASE ORAL at 11:52

## 2023-01-25 RX ADMIN — Medication 2000 UNIT(S): at 11:53

## 2023-01-25 RX ADMIN — DIVALPROEX SODIUM 500 MILLIGRAM(S): 500 TABLET, DELAYED RELEASE ORAL at 21:17

## 2023-01-25 NOTE — DISCHARGE NOTE PROVIDER - NSDCMRMEDTOKEN_GEN_ALL_CORE_FT
acetaminophen 500 mg oral tablet: 2 tab(s) orally 2 times a day  clonazePAM 0.5 mg oral tablet: 1 tab(s) orally once a day (at bedtime)  divalproex sodium 500 mg oral tablet, extended release: 1 tab(s) orally once a day (at bedtime)  docusate sodium 100 mg oral capsule: 2  orally once a day (at bedtime)  donepezil 5 mg oral tablet: 1 tab(s) orally once a day (at bedtime)  gabapentin 300 mg oral tablet: 1 tab(s) orally 2 times a day  MiraLax oral powder for reconstitution: 1 packet(s) orally once a day  paliperidone 6 mg oral tablet, extended release: 1 tab(s) orally once a day (in the morning)  QUEtiapine 200 mg oral tablet: 1 tab(s) orally once a day (at bedtime)  QUEtiapine 50 mg oral tablet: 1  orally once a day (at bedtime)  traZODone 100 mg oral tablet: orally once a day (at bedtime)   acetaminophen 500 mg oral tablet: 2 tab(s) orally 2 times a day  aspirin 81 mg oral capsule: 1 cap(s) orally once a day   clonazePAM 0.5 mg oral tablet: 1 tab(s) orally once a day (at bedtime)  divalproex sodium 500 mg oral tablet, extended release: 1 tab(s) orally once a day (at bedtime)  docusate sodium 100 mg oral capsule: 2  orally once a day (at bedtime)  donepezil 5 mg oral tablet: 1 tab(s) orally once a day (at bedtime)  gabapentin 300 mg oral tablet: 1 tab(s) orally 2 times a day  MiraLax oral powder for reconstitution: 1 packet(s) orally once a day  paliperidone 6 mg oral tablet, extended release: 1 tab(s) orally once a day (in the morning)  QUEtiapine 200 mg oral tablet: 1 tab(s) orally once a day (at bedtime)  QUEtiapine 50 mg oral tablet: 5 tab(s) orally once a day (at bedtime)  traZODone 100 mg oral tablet: orally once a day (at bedtime)

## 2023-01-25 NOTE — PROGRESS NOTE ADULT - SUBJECTIVE AND OBJECTIVE BOX
Patient is a 77y old  Female who presents with a chief complaint of Tremor     (24 Jan 2023 12:12)      Patient seen and examined at bedside.    ALLERGIES:  No Known Allergies    MEDICATIONS  (STANDING):  cholecalciferol 2000 Unit(s) Oral daily  divalproex  milliGRAM(s) Oral at bedtime  donepezil 5 milliGRAM(s) Oral at bedtime  enoxaparin Injectable 60 milliGRAM(s) SubCutaneous every 12 hours  gabapentin 300 milliGRAM(s) Oral two times a day  influenza  Vaccine (HIGH DOSE) 0.7 milliLiter(s) IntraMuscular once  paliperidone ER. 6 milliGRAM(s) Oral daily  pantoprazole    Tablet 40 milliGRAM(s) Oral before breakfast  polyethylene glycol 3350 17 Gram(s) Oral daily  QUEtiapine 250 milliGRAM(s) Oral at bedtime  remdesivir  IVPB 100 milliGRAM(s) IV Intermittent every 24 hours  remdesivir  IVPB   IV Intermittent   traZODone 100 milliGRAM(s) Oral at bedtime  zinc sulfate 220 milliGRAM(s) Oral daily    MEDICATIONS  (PRN):  acetaminophen     Tablet .. 650 milliGRAM(s) Oral every 6 hours PRN Temp greater or equal to 38C (100.4F), Mild Pain (1 - 3)  aluminum hydroxide/magnesium hydroxide/simethicone Suspension 30 milliLiter(s) Oral every 4 hours PRN Dyspepsia  clonazePAM  Tablet 0.5 milliGRAM(s) Oral at bedtime PRN anxiety  melatonin 3 milliGRAM(s) Oral at bedtime PRN Insomnia  ondansetron Injectable 4 milliGRAM(s) IV Push every 8 hours PRN Nausea and/or Vomiting    Vital Signs Last 24 Hrs  T(F): 97.5 (25 Jan 2023 05:11), Max: 98.9 (24 Jan 2023 16:18)  HR: 82 (25 Jan 2023 05:11) (82 - 88)  BP: 110/66 (25 Jan 2023 05:11) (110/66 - 127/73)  RR: 16 (25 Jan 2023 05:11) (16 - 17)  SpO2: 94% (25 Jan 2023 05:11) (94% - 96%)  I&O's Summary    24 Jan 2023 07:01  -  25 Jan 2023 07:00  --------------------------------------------------------  IN: 820 mL / OUT: 0 mL / NET: 820 mL      PHYSICAL EXAM:  General: NAD, A/O x 3  ENT: MMM  Neck: Supple, No JVD  Lungs: Clear to auscultation bilaterally, Non labored breathing   Cardio: RRR, S1/S2, No murmurs  Abdomen: Soft, Nontender, Nondistended; Bowel sounds present  Extremities: No calf tenderness, No pitting edema    LABS:                        10.5   6.98  )-----------( 253      ( 25 Jan 2023 06:28 )             32.5     01-25    141  |  104  |  25  ----------------------------<  99  4.2   |  27  |  0.79    Ca    8.6      25 Jan 2023 06:28    TPro  6.2  /  Alb  3.3  /  TBili  0.3  /  DBili  0.1  /  AST  12  /  ALT  7   /  AlkPhos  72  01-24      PT/INR - ( 25 Jan 2023 06:28 )   PT: 12.7 sec;   INR: 1.09 ratio                                       Culture - Urine (collected 20 Jan 2023 22:15)  Source: Clean Catch Clean Catch (Midstream)  Final Report (22 Jan 2023 18:08):    <10,000 CFU/mL Normal Urogenital Desiree      COVID-19 PCR: Detected (01-20-23 @ 22:00)    RADIOLOGY & ADDITIONAL TESTS:    Care Discussed with Consultants/Other Providers:    Patient is a 77y old  Female who presents with a chief complaint of Tremor    Patient seen and examined at bedside.  Pt without complaints , no overnight events would like to go home.    ALLERGIES:  No Known Allergies    MEDICATIONS  (STANDING):  cholecalciferol 2000 Unit(s) Oral daily  divalproex  milliGRAM(s) Oral at bedtime  donepezil 5 milliGRAM(s) Oral at bedtime  enoxaparin Injectable 60 milliGRAM(s) SubCutaneous every 12 hours  gabapentin 300 milliGRAM(s) Oral two times a day  influenza  Vaccine (HIGH DOSE) 0.7 milliLiter(s) IntraMuscular once  paliperidone ER. 6 milliGRAM(s) Oral daily  pantoprazole    Tablet 40 milliGRAM(s) Oral before breakfast  polyethylene glycol 3350 17 Gram(s) Oral daily  QUEtiapine 250 milliGRAM(s) Oral at bedtime  remdesivir  IVPB 100 milliGRAM(s) IV Intermittent every 24 hours  remdesivir  IVPB   IV Intermittent   traZODone 100 milliGRAM(s) Oral at bedtime  zinc sulfate 220 milliGRAM(s) Oral daily    MEDICATIONS  (PRN):  acetaminophen     Tablet .. 650 milliGRAM(s) Oral every 6 hours PRN Temp greater or equal to 38C (100.4F), Mild Pain (1 - 3)  aluminum hydroxide/magnesium hydroxide/simethicone Suspension 30 milliLiter(s) Oral every 4 hours PRN Dyspepsia  clonazePAM  Tablet 0.5 milliGRAM(s) Oral at bedtime PRN anxiety  melatonin 3 milliGRAM(s) Oral at bedtime PRN Insomnia  ondansetron Injectable 4 milliGRAM(s) IV Push every 8 hours PRN Nausea and/or Vomiting    Vital Signs Last 24 Hrs  T(F): 97.5 (25 Jan 2023 05:11), Max: 98.9 (24 Jan 2023 16:18)  HR: 82 (25 Jan 2023 05:11) (82 - 88)  BP: 110/66 (25 Jan 2023 05:11) (110/66 - 127/73)  RR: 16 (25 Jan 2023 05:11) (16 - 17)  SpO2: 94% (25 Jan 2023 05:11) (94% - 96%)  I&O's Summary    24 Jan 2023 07:01  -  25 Jan 2023 07:00  --------------------------------------------------------  IN: 820 mL / OUT: 0 mL / NET: 820 mL      PHYSICAL EXAM:  General: 76y/o  NAD, A/O x 3  ENT: MMM  Neck: Supple, No JVD  Lungs: Clear to auscultation bilaterally, Non labored breathing   Cardio: RRR, S1/S2, No murmurs  Abdomen: Soft, Nontender, Nondistended; Bowel sounds present  Extremities: No calf tenderness, No pitting edema    LABS:                        10.5   6.98  )-----------( 253      ( 25 Jan 2023 06:28 )             32.5     01-25    141  |  104  |  25  ----------------------------<  99  4.2   |  27  |  0.79    Ca    8.6      25 Jan 2023 06:28    TPro  6.2  /  Alb  3.3  /  TBili  0.3  /  DBili  0.1  /  AST  12  /  ALT  7   /  AlkPhos  72  01-24      PT/INR - ( 25 Jan 2023 06:28 )   PT: 12.7 sec;   INR: 1.09 ratio                                       Culture - Urine (collected 20 Jan 2023 22:15)  Source: Clean Catch Clean Catch (Midstream)  Final Report (22 Jan 2023 18:08):    <10,000 CFU/mL Normal Urogenital Desiree      COVID-19 PCR: Detected (01-20-23 @ 22:00)    RADIOLOGY & ADDITIONAL TESTS:    Care Discussed with Consultants/Other Providers:

## 2023-01-25 NOTE — PROGRESS NOTE ADULT - NS ATTEND AMEND GEN_ALL_CORE FT
Seen and examined. Chart reviewed.   patient is improving clinically.   Agree with above a/p  Edited where ever is necessary    Duplex neg for DVT. showed b/l baker;s cyst. needs ortho f/u OP  no complaints. walked with PT> o2 sat 98% on ambulation on RA. no skilled PT Need.   c/w current rx. dc remdesevir  pharmacy suggested to increase lovenox to full dose as ddimer >500 and covid+  h/h slightly dropped compared to previous. monitor  low albumin  encouraged PO    dispo: [facility] on Jan 27 as [facility] not accept until then for covid isolation

## 2023-01-25 NOTE — DISCHARGE NOTE PROVIDER - NSDCCPCAREPLAN_GEN_ALL_CORE_FT
PRINCIPAL DISCHARGE DIAGNOSIS  Diagnosis: Acute COVID-19  Assessment and Plan of Treatment: you were admitted to the hospital with Covid, you received four days of IV remdesivir.  your respiratory status is stable.  you are cleared to return to [facility].

## 2023-01-25 NOTE — DISCHARGE NOTE PROVIDER - HOSPITAL COURSE
Hospital Course  HPI:  75F h/o mild dementia, lives in [facility], comes in c/o x 2 days of cough, weakness, lethargy, unsteady gait, incontinent of urine  x 2 days unable to perform ADL'S.  baseline is independent.   in ED- noted to be covid 19 positive, head ct negative (21 Jan 2023 01:39)      You were admitted for   You were diagnosed with   You were treated with   You were prescribed the following new medications:    You will need to follow up with your primary care physician.    Source of Infection:  Antibiotic / Last Day:    Palliative Care / Advanced Care Planning  Code Status:  Patient/Family agreeable to Hospice/Palliative (Y/N)?  Summary of Goals of Care Conversation:    Discharging Provider:  Alisha Bailey   Contact Info: Cell [phone] - Please call with any questions or concerns.    Outpatient Provider:    Signout given to  SNF Provider:   [facility] Hospital Course  HPI:  75F h/o mild dementia, lives in [facility], comes in c/o x 2 days of cough, weakness, lethargy, unsteady gait, incontinent of urine  x 2 days unable to perform ADL'S.  baseline is independent.   In ED- noted to be covid 19 positive, head ct negative (21 Jan 2023 01:39)  Patient admitted to the hospital with Acute symptomatic Covid infection and functional decline likely related to Covid.  Hospital course unremarkable, pt received four days of IV remdesivir to help mitigate risk for progression of disease.  Not hypoxic, stable respiratory status.  Patient clinically improved, stable, physical therapy saw pt - no skilled PT needs needed.        You were admitted for cough, weakness, lethargy, functional decline  You were diagnosed with Covid 19  You were treated with remdesivir  You were prescribed the following new medications: Aspirin 81 mg daily for 31 days for extended VTE PPx    You will need to follow up with your primary care physician.    Source of Infection:  Antibiotic / Last Day: Na    Palliative Care / Advanced Care Planning  Code Status: Full Code  Patient/Family agreeable to Hospice/Palliative (Y/N)?  Summary of Goals of Care Conversation:    Discharging Provider:  Ghulam Ramos NP  Contact Info: Cell [phone] - Please call with any questions or concerns.    Outpatient Provider: Dr. Villa - notified

## 2023-01-25 NOTE — PROGRESS NOTE ADULT - ASSESSMENT
75 female with mild dementia, from [facility] presents with cough, weakness, lethargy, unsteady gait, incontinent of urine x 2 days unable to perform ADLs (baseline independent) found with Covid 19.    Acute Covid 19 Infection  metabolic encephalopathy likely due to Covid: improved  Functional Decline likely due to above  Covid PCR positive on 1/20/23, D Dimer 544  CT head in ED negative  Continue remdesivir (day 3)  Not hypoxic, stable respiratory status on room air and with ambulation  Supportive care  Airborne Precautions  Fall Precautions  PT evaluation-no PT needs  Doppler b/l LE negative for DVT, b/l complex baker cysts    Anxiety  Continue klonopin prn    Depression  Continue depakote, neurontin, paliperidone, seroquel    Insomnia  Continue trazodone    Dementia  Continue Aricept    Prophylactic Measure  lovenox    Needs 5 days of isolation before returning to [facility]    Called to provide an update for micheal crandall [phone], voicemail box full 75 female with mild dementia, from [facility] presents with cough, weakness, lethargy, unsteady gait, incontinent of urine x 2 days unable to perform ADLs (baseline independent) found with Covid 19.    Acute Covid 19 Infection  metabolic encephalopathy likely due to Covid: improved  Functional Decline likely due to above  Covid PCR positive on 1/20/23, D Dimer 544  CT head in ED negative  remdesivir (day 4)- will discontinue today   Not hypoxic, stable respiratory status on room air and with ambulation  Supportive care  Airborne Precautions  Fall Precautions  PT evaluation-no PT needs  Doppler b/l LE negative for DVT, b/l complex baker cysts  Covid swab sent today     Anxiety  Continue klonopin prn    Depression  Continue depakote, neurontin, paliperidone, seroquel    Insomnia  Continue trazodone    Dementia  Continue Aricept    Prophylactic Measure  lovenox    Needs 5 days of isolation before returning to [place]- anticipate return to [place] in am - covid swab sent today     Called to provide an update for micheal crandall [phone], Voice mail box full - texted daughter awaiting response

## 2023-01-25 NOTE — DISCHARGE NOTE PROVIDER - CARE PROVIDER_API CALL
Sarthak Villa  [address], [facility] [address]  [place] [address]  Phone: [phone]  Fax: [phone]  Follow Up Time:

## 2023-01-26 LAB
ALBUMIN SERPL ELPH-MCNC: 3.6 G/DL — SIGNIFICANT CHANGE UP (ref 3.3–5)
ALP SERPL-CCNC: 87 U/L — SIGNIFICANT CHANGE UP (ref 40–120)
ALT FLD-CCNC: 14 U/L — SIGNIFICANT CHANGE UP (ref 10–45)
AST SERPL-CCNC: 14 U/L — SIGNIFICANT CHANGE UP (ref 10–40)
BILIRUB DIRECT SERPL-MCNC: 0.1 MG/DL — SIGNIFICANT CHANGE UP (ref 0–0.3)
BILIRUB INDIRECT FLD-MCNC: 0.2 MG/DL — SIGNIFICANT CHANGE UP (ref 0.2–1)
BILIRUB SERPL-MCNC: 0.3 MG/DL — SIGNIFICANT CHANGE UP (ref 0.2–1.2)
CREAT SERPL-MCNC: 0.94 MG/DL — SIGNIFICANT CHANGE UP (ref 0.5–1.3)
EGFR: 63 ML/MIN/1.73M2 — SIGNIFICANT CHANGE UP
INR BLD: 1.07 RATIO — SIGNIFICANT CHANGE UP (ref 0.88–1.16)
PROT SERPL-MCNC: 6.9 G/DL — SIGNIFICANT CHANGE UP (ref 6–8.3)
PROTHROM AB SERPL-ACNC: 12.4 SEC — SIGNIFICANT CHANGE UP (ref 10.5–13.4)

## 2023-01-26 PROCEDURE — 99232 SBSQ HOSP IP/OBS MODERATE 35: CPT

## 2023-01-26 RX ORDER — ENOXAPARIN SODIUM 100 MG/ML
40 INJECTION SUBCUTANEOUS EVERY 24 HOURS
Refills: 0 | Status: DISCONTINUED | OUTPATIENT
Start: 2023-01-27 | End: 2023-01-27

## 2023-01-26 RX ADMIN — ZINC SULFATE TAB 220 MG (50 MG ZINC EQUIVALENT) 220 MILLIGRAM(S): 220 (50 ZN) TAB at 11:26

## 2023-01-26 RX ADMIN — Medication 2000 UNIT(S): at 11:26

## 2023-01-26 RX ADMIN — DONEPEZIL HYDROCHLORIDE 5 MILLIGRAM(S): 10 TABLET, FILM COATED ORAL at 21:42

## 2023-01-26 RX ADMIN — Medication 650 MILLIGRAM(S): at 05:13

## 2023-01-26 RX ADMIN — PALIPERIDONE 6 MILLIGRAM(S): 1.5 TABLET, EXTENDED RELEASE ORAL at 11:26

## 2023-01-26 RX ADMIN — GABAPENTIN 300 MILLIGRAM(S): 400 CAPSULE ORAL at 05:09

## 2023-01-26 RX ADMIN — ENOXAPARIN SODIUM 60 MILLIGRAM(S): 100 INJECTION SUBCUTANEOUS at 05:10

## 2023-01-26 RX ADMIN — Medication 0.5 MILLIGRAM(S): at 17:58

## 2023-01-26 RX ADMIN — QUETIAPINE FUMARATE 250 MILLIGRAM(S): 200 TABLET, FILM COATED ORAL at 21:42

## 2023-01-26 RX ADMIN — GABAPENTIN 300 MILLIGRAM(S): 400 CAPSULE ORAL at 17:59

## 2023-01-26 RX ADMIN — Medication 650 MILLIGRAM(S): at 06:10

## 2023-01-26 RX ADMIN — Medication 100 MILLIGRAM(S): at 21:42

## 2023-01-26 RX ADMIN — Medication 650 MILLIGRAM(S): at 18:30

## 2023-01-26 RX ADMIN — DIVALPROEX SODIUM 500 MILLIGRAM(S): 500 TABLET, DELAYED RELEASE ORAL at 21:42

## 2023-01-26 RX ADMIN — Medication 650 MILLIGRAM(S): at 18:00

## 2023-01-26 RX ADMIN — POLYETHYLENE GLYCOL 3350 17 GRAM(S): 17 POWDER, FOR SOLUTION ORAL at 11:26

## 2023-01-26 RX ADMIN — PANTOPRAZOLE SODIUM 40 MILLIGRAM(S): 20 TABLET, DELAYED RELEASE ORAL at 05:09

## 2023-01-26 NOTE — PROGRESS NOTE ADULT - ASSESSMENT
75 female with mild dementia, from [facility] presents with cough, weakness, lethargy, unsteady gait, incontinent of urine x 2 days unable to perform ADLs (baseline independent) found with Covid 19.    Acute Covid 19 Infection  metabolic encephalopathy likely due to Covid: improved  Functional Decline likely due to above  Covid PCR positive on 1/20/23, 1/25 - D Dimer trending down   CT head in ED negative  Completed four days of remdesivir  Not hypoxic, stable respiratory status on room air and with ambulation  Supportive care  Airborne Precautions  Fall Precautions  PT evaluation-no PT needs  Doppler b/l LE negative for DVT, b/l complex baker cysts  [facility] requiring seven days ISO     Anxiety  Continue klonopin prn    Depression  Continue depakote, neurontin, paliperidone, seroquel    Insomnia  Continue trazodone    Dementia  Continue Aricept    Prophylactic Measure  lovenox      Called to provide an update for micheal crandall [phone], all questions answered

## 2023-01-26 NOTE — PROGRESS NOTE ADULT - NS ATTEND AMEND GEN_ALL_CORE FT
COVID infection   metabolic encephalopathy suspect due to above- improved  -s/p 4 days of remdesivir   -respiratory status currently stable  -DC to [facility] tomorrow once isolation completes

## 2023-01-26 NOTE — PROGRESS NOTE ADULT - SUBJECTIVE AND OBJECTIVE BOX
Patient is a 77y old  Female who presents with a chief complaint of weakness, cough (25 Jan 2023 14:17)    Patient seen and examined at bedside.  no acute events overnight    ALLERGIES:  No Known Allergies        Vital Signs Last 24 Hrs  T(F): 97.6 (26 Jan 2023 04:53), Max: 98.4 (25 Jan 2023 21:25)  HR: 76 (26 Jan 2023 04:53) (76 - 83)  BP: 118/76 (26 Jan 2023 04:53) (118/76 - 139/68)  RR: 18 (26 Jan 2023 04:53) (16 - 18)  SpO2: 97% (26 Jan 2023 04:53) (96% - 98%)  I&O's Summary    25 Jan 2023 07:01  -  26 Jan 2023 07:00  --------------------------------------------------------  IN: 720 mL / OUT: 0 mL / NET: 720 mL    26 Jan 2023 07:01  -  26 Jan 2023 11:33  --------------------------------------------------------  IN: 720 mL / OUT: 0 mL / NET: 720 mL      MEDICATIONS:  acetaminophen     Tablet .. 650 milliGRAM(s) Oral every 6 hours PRN  aluminum hydroxide/magnesium hydroxide/simethicone Suspension 30 milliLiter(s) Oral every 4 hours PRN  cholecalciferol 2000 Unit(s) Oral daily  clonazePAM  Tablet 0.5 milliGRAM(s) Oral at bedtime PRN  divalproex  milliGRAM(s) Oral at bedtime  donepezil 5 milliGRAM(s) Oral at bedtime  gabapentin 300 milliGRAM(s) Oral two times a day  influenza  Vaccine (HIGH DOSE) 0.7 milliLiter(s) IntraMuscular once  melatonin 3 milliGRAM(s) Oral at bedtime PRN  ondansetron Injectable 4 milliGRAM(s) IV Push every 8 hours PRN  paliperidone ER. 6 milliGRAM(s) Oral daily  pantoprazole    Tablet 40 milliGRAM(s) Oral before breakfast  polyethylene glycol 3350 17 Gram(s) Oral daily  QUEtiapine 250 milliGRAM(s) Oral at bedtime  traZODone 100 milliGRAM(s) Oral at bedtime  zinc sulfate 220 milliGRAM(s) Oral daily      PHYSICAL EXAM:  General: NAD, A/O x 3  ENT: MMM, no thrush  Neck: Supple, No JVD  Lungs: Clear to auscultation bilaterally, non labored, good air entry  Cardio: RRR, S1/S2, No murmurs  Abdomen: Soft, Nontender, Nondistended; Bowel sounds present  Extremities: No cyanosis, No edema    LABS:                        10.5   6.98  )-----------( 253      ( 25 Jan 2023 06:28 )             32.5     01-26    x   |  x   |  x   ----------------------------<  x   x    |  x   |  0.94    Ca    8.6      25 Jan 2023 06:28    TPro  6.9  /  Alb  3.6  /  TBili  0.3  /  DBili  0.1  /  AST  14  /  ALT  14  /  AlkPhos  87  01-26      PT/INR - ( 26 Jan 2023 07:20 )   PT: 12.4 sec;   INR: 1.07 ratio                                       Culture - Urine (collected 20 Jan 2023 22:15)  Source: Clean Catch Clean Catch (Midstream)  Final Report (22 Jan 2023 18:08):    <10,000 CFU/mL Normal Urogenital Desiree      COVID-19 PCR: Detected (01-25-23 @ 12:00)  COVID-19 PCR: Detected (01-20-23 @ 22:00)      RADIOLOGY & ADDITIONAL TESTS:    Care Discussed with Consultants/Other Providers:

## 2023-01-27 ENCOUNTER — TRANSCRIPTION ENCOUNTER (OUTPATIENT)
Age: 78
End: 2023-01-27

## 2023-01-27 VITALS
RESPIRATION RATE: 16 BRPM | SYSTOLIC BLOOD PRESSURE: 104 MMHG | DIASTOLIC BLOOD PRESSURE: 54 MMHG | HEART RATE: 65 BPM | OXYGEN SATURATION: 94 % | TEMPERATURE: 98 F

## 2023-01-27 LAB
ALBUMIN SERPL ELPH-MCNC: 3.3 G/DL — SIGNIFICANT CHANGE UP (ref 3.3–5)
ALP SERPL-CCNC: 81 U/L — SIGNIFICANT CHANGE UP (ref 40–120)
ALT FLD-CCNC: 16 U/L — SIGNIFICANT CHANGE UP (ref 10–45)
AST SERPL-CCNC: 19 U/L — SIGNIFICANT CHANGE UP (ref 10–40)
BILIRUB DIRECT SERPL-MCNC: 0 MG/DL — SIGNIFICANT CHANGE UP (ref 0–0.3)
BILIRUB INDIRECT FLD-MCNC: 0.3 MG/DL — SIGNIFICANT CHANGE UP (ref 0.2–1)
BILIRUB SERPL-MCNC: 0.3 MG/DL — SIGNIFICANT CHANGE UP (ref 0.2–1.2)
CREAT SERPL-MCNC: 0.93 MG/DL — SIGNIFICANT CHANGE UP (ref 0.5–1.3)
EGFR: 64 ML/MIN/1.73M2 — SIGNIFICANT CHANGE UP
INR BLD: 1.03 RATIO — SIGNIFICANT CHANGE UP (ref 0.88–1.16)
PROT SERPL-MCNC: 6.2 G/DL — SIGNIFICANT CHANGE UP (ref 6–8.3)
PROTHROM AB SERPL-ACNC: 12 SEC — SIGNIFICANT CHANGE UP (ref 10.5–13.4)

## 2023-01-27 PROCEDURE — 36415 COLL VENOUS BLD VENIPUNCTURE: CPT

## 2023-01-27 PROCEDURE — 85025 COMPLETE CBC W/AUTO DIFF WBC: CPT

## 2023-01-27 PROCEDURE — 80053 COMPREHEN METABOLIC PANEL: CPT

## 2023-01-27 PROCEDURE — 87086 URINE CULTURE/COLONY COUNT: CPT

## 2023-01-27 PROCEDURE — 82728 ASSAY OF FERRITIN: CPT

## 2023-01-27 PROCEDURE — 80048 BASIC METABOLIC PNL TOTAL CA: CPT

## 2023-01-27 PROCEDURE — 82565 ASSAY OF CREATININE: CPT

## 2023-01-27 PROCEDURE — 83615 LACTATE (LD) (LDH) ENZYME: CPT

## 2023-01-27 PROCEDURE — 85610 PROTHROMBIN TIME: CPT

## 2023-01-27 PROCEDURE — 80076 HEPATIC FUNCTION PANEL: CPT

## 2023-01-27 PROCEDURE — 93970 EXTREMITY STUDY: CPT

## 2023-01-27 PROCEDURE — 71045 X-RAY EXAM CHEST 1 VIEW: CPT

## 2023-01-27 PROCEDURE — 99239 HOSP IP/OBS DSCHRG MGMT >30: CPT

## 2023-01-27 PROCEDURE — 70450 CT HEAD/BRAIN W/O DYE: CPT | Mod: MA

## 2023-01-27 PROCEDURE — 85027 COMPLETE CBC AUTOMATED: CPT

## 2023-01-27 PROCEDURE — 99285 EMERGENCY DEPT VISIT HI MDM: CPT | Mod: 25

## 2023-01-27 PROCEDURE — 97162 PT EVAL MOD COMPLEX 30 MIN: CPT

## 2023-01-27 PROCEDURE — 84484 ASSAY OF TROPONIN QUANT: CPT

## 2023-01-27 PROCEDURE — 87635 SARS-COV-2 COVID-19 AMP PRB: CPT

## 2023-01-27 PROCEDURE — 86140 C-REACTIVE PROTEIN: CPT

## 2023-01-27 PROCEDURE — 85379 FIBRIN DEGRADATION QUANT: CPT

## 2023-01-27 PROCEDURE — 86803 HEPATITIS C AB TEST: CPT

## 2023-01-27 RX ORDER — ASPIRIN/CALCIUM CARB/MAGNESIUM 324 MG
1 TABLET ORAL
Qty: 31 | Refills: 0
Start: 2023-01-27 | End: 2023-02-26

## 2023-01-27 RX ORDER — QUETIAPINE FUMARATE 200 MG/1
1 TABLET, FILM COATED ORAL
Qty: 0 | Refills: 0 | DISCHARGE

## 2023-01-27 RX ORDER — QUETIAPINE FUMARATE 200 MG/1
5 TABLET, FILM COATED ORAL
Qty: 0 | Refills: 0 | DISCHARGE
Start: 2023-01-27

## 2023-01-27 RX ADMIN — POLYETHYLENE GLYCOL 3350 17 GRAM(S): 17 POWDER, FOR SOLUTION ORAL at 11:45

## 2023-01-27 RX ADMIN — PANTOPRAZOLE SODIUM 40 MILLIGRAM(S): 20 TABLET, DELAYED RELEASE ORAL at 05:11

## 2023-01-27 RX ADMIN — ZINC SULFATE TAB 220 MG (50 MG ZINC EQUIVALENT) 220 MILLIGRAM(S): 220 (50 ZN) TAB at 11:41

## 2023-01-27 RX ADMIN — Medication 0.5 MILLIGRAM(S): at 00:10

## 2023-01-27 RX ADMIN — PALIPERIDONE 6 MILLIGRAM(S): 1.5 TABLET, EXTENDED RELEASE ORAL at 11:41

## 2023-01-27 RX ADMIN — ENOXAPARIN SODIUM 40 MILLIGRAM(S): 100 INJECTION SUBCUTANEOUS at 05:11

## 2023-01-27 RX ADMIN — GABAPENTIN 300 MILLIGRAM(S): 400 CAPSULE ORAL at 05:11

## 2023-01-27 RX ADMIN — Medication 2000 UNIT(S): at 11:42

## 2023-01-27 RX ADMIN — Medication 3 MILLIGRAM(S): at 00:10

## 2023-01-27 NOTE — PROGRESS NOTE ADULT - NS ATTEND BILL GEN_ALL_CORE
Attending to bill
None

## 2023-01-27 NOTE — PROGRESS NOTE ADULT - ASSESSMENT
75 female with mild dementia, from [facility] presents with cough, weakness, lethargy, unsteady gait, incontinent of urine x 2 days unable to perform ADLs (baseline independent) found with Covid 19.    Acute Covid 19 Infection  metabolic encephalopathy likely due to Covid: improved  Functional Decline likely due to above  Covid PCR positive on 1/20/23, 1/25 - D Dimer trending down   CT head in ED negative  Completed four days of remdesivir  Not hypoxic, stable respiratory status on room air and with ambulation  Supportive care  Airborne Precautions  Fall Precautions  PT evaluation-no PT needs  Doppler b/l LE negative for DVT, b/l complex baker cysts  Discharge planning [facility]    Anxiety  Continue klonopin prn    Depression  Continue depakote, neurontin, paliperidone, seroquel    Insomnia  Continue trazodone    Dementia  Continue Aricept    Prophylactic Measure  lovenox      Called to provide an update for micheal crandall [phone], all questions answered

## 2023-01-27 NOTE — PROGRESS NOTE ADULT - NS ATTEND AMEND GEN_ALL_CORE FT
COVID infection   Metabolic encephalopathy   - respiratory status stable  - mental status improved  - completed 4 days of remdesivir   - DC back to [facility]

## 2023-01-27 NOTE — PROGRESS NOTE ADULT - NS ATTEND OPT1 GEN_ALL_CORE
I independently performed the documented:
I attest my time as attending is greater than 50% of the total combined time spent on qualifying patient care activities by the PA/NP and attending.
I attest my time as attending is greater than 50% of the total combined time spent on qualifying patient care activities by the PA/NP and attending.
I independently performed the documented:
I attest my time as attending is greater than 50% of the total combined time spent on qualifying patient care activities by the PA/NP and attending.
I attest my time as attending is greater than 50% of the total combined time spent on qualifying patient care activities by the PA/NP and attending.

## 2023-01-27 NOTE — DISCHARGE NOTE NURSING/CASE MANAGEMENT/SOCIAL WORK - NSDCPEFALRISK_GEN_ALL_CORE
For information on Fall & Injury Prevention, visit: [email or web address] OR  [email or web address] OR  https://www.cdc.gov/steadi/patient.html

## 2023-01-27 NOTE — PROGRESS NOTE ADULT - SUBJECTIVE AND OBJECTIVE BOX
Patient is a 77y old  Female who presents with a chief complaint of weakness, cough (26 Jan 2023 11:33)    Patient seen and examined at bedside.  no acute events overnight    ALLERGIES:  No Known Allergies        Vital Signs Last 24 Hrs  T(F): 98 (27 Jan 2023 05:04), Max: 98 (26 Jan 2023 20:04)  HR: 65 (27 Jan 2023 05:04) (58 - 83)  BP: 104/54 (27 Jan 2023 05:04) (104/54 - 133/76)  RR: 16 (27 Jan 2023 05:04) (16 - 18)  SpO2: 94% (27 Jan 2023 05:04) (94% - 96%)  I&O's Summary    26 Jan 2023 07:01  -  27 Jan 2023 07:00  --------------------------------------------------------  IN: 960 mL / OUT: 0 mL / NET: 960 mL      MEDICATIONS:  acetaminophen     Tablet .. 650 milliGRAM(s) Oral every 6 hours PRN  aluminum hydroxide/magnesium hydroxide/simethicone Suspension 30 milliLiter(s) Oral every 4 hours PRN  cholecalciferol 2000 Unit(s) Oral daily  clonazePAM  Tablet 0.5 milliGRAM(s) Oral at bedtime PRN  divalproex  milliGRAM(s) Oral at bedtime  donepezil 5 milliGRAM(s) Oral at bedtime  enoxaparin Injectable 40 milliGRAM(s) SubCutaneous every 24 hours  gabapentin 300 milliGRAM(s) Oral two times a day  influenza  Vaccine (HIGH DOSE) 0.7 milliLiter(s) IntraMuscular once  melatonin 3 milliGRAM(s) Oral at bedtime PRN  ondansetron Injectable 4 milliGRAM(s) IV Push every 8 hours PRN  paliperidone ER. 6 milliGRAM(s) Oral daily  pantoprazole    Tablet 40 milliGRAM(s) Oral before breakfast  polyethylene glycol 3350 17 Gram(s) Oral daily  QUEtiapine 250 milliGRAM(s) Oral at bedtime  traZODone 100 milliGRAM(s) Oral at bedtime  zinc sulfate 220 milliGRAM(s) Oral daily      PHYSICAL EXAM:  General: NAD, A/O x 3  ENT: MMM  Neck: Supple, No JVD  Lungs: Clear to auscultation bilaterally  Cardio: RRR, S1/S2, No murmurs  Abdomen: Soft, Nontender, Nondistended; Bowel sounds present  Extremities: No cyanosis, No edema    LABS:                        10.5   6.98  )-----------( 253      ( 25 Jan 2023 06:28 )             32.5     01-27    x   |  x   |  x   ----------------------------<  x   x    |  x   |  0.93    Ca    8.6      25 Jan 2023 06:28    TPro  6.2  /  Alb  3.3  /  TBili  0.3  /  DBili  0.0  /  AST  19  /  ALT  16  /  AlkPhos  81  01-27      PT/INR - ( 27 Jan 2023 06:30 )   PT: 12.0 sec;   INR: 1.03 ratio                                       Culture - Urine (collected 20 Jan 2023 22:15)  Source: Clean Catch Clean Catch (Midstream)  Final Report (22 Jan 2023 18:08):    <10,000 CFU/mL Normal Urogenital Desiree      COVID-19 PCR: Detected (01-25-23 @ 12:00)  COVID-19 PCR: Detected (01-20-23 @ 22:00)      RADIOLOGY & ADDITIONAL TESTS:    Care Discussed with Consultants/Other Providers:    Patient is a 77y old  Female who presents with a chief complaint of weakness, cough (26 Jan 2023 11:33)    Patient seen and examined at bedside.  no acute events overnight    ALLERGIES:  No Known Allergies        Vital Signs Last 24 Hrs  T(F): 98 (27 Jan 2023 05:04), Max: 98 (26 Jan 2023 20:04)  HR: 65 (27 Jan 2023 05:04) (58 - 83)  BP: 104/54 (27 Jan 2023 05:04) (104/54 - 133/76)  RR: 16 (27 Jan 2023 05:04) (16 - 18)  SpO2: 94% (27 Jan 2023 05:04) (94% - 96%)  I&O's Summary    26 Jan 2023 07:01  -  27 Jan 2023 07:00  --------------------------------------------------------  IN: 960 mL / OUT: 0 mL / NET: 960 mL      MEDICATIONS:  acetaminophen     Tablet .. 650 milliGRAM(s) Oral every 6 hours PRN  aluminum hydroxide/magnesium hydroxide/simethicone Suspension 30 milliLiter(s) Oral every 4 hours PRN  cholecalciferol 2000 Unit(s) Oral daily  clonazePAM  Tablet 0.5 milliGRAM(s) Oral at bedtime PRN  divalproex  milliGRAM(s) Oral at bedtime  donepezil 5 milliGRAM(s) Oral at bedtime  enoxaparin Injectable 40 milliGRAM(s) SubCutaneous every 24 hours  gabapentin 300 milliGRAM(s) Oral two times a day  influenza  Vaccine (HIGH DOSE) 0.7 milliLiter(s) IntraMuscular once  melatonin 3 milliGRAM(s) Oral at bedtime PRN  ondansetron Injectable 4 milliGRAM(s) IV Push every 8 hours PRN  paliperidone ER. 6 milliGRAM(s) Oral daily  pantoprazole    Tablet 40 milliGRAM(s) Oral before breakfast  polyethylene glycol 3350 17 Gram(s) Oral daily  QUEtiapine 250 milliGRAM(s) Oral at bedtime  traZODone 100 milliGRAM(s) Oral at bedtime  zinc sulfate 220 milliGRAM(s) Oral daily      PHYSICAL EXAM:  General: NAD, A/O x 3  ENT: MMM, no thrush  Neck: Supple, No JVD  Lungs: Clear to auscultation bilaterally, non labored, good air entry  Cardio: RRR, S1/S2, No murmurs  Abdomen: Soft, Nontender, Nondistended; Bowel sounds present  Extremities: No cyanosis, No edema    LABS:                        10.5   6.98  )-----------( 253      ( 25 Jan 2023 06:28 )             32.5     01-27    x   |  x   |  x   ----------------------------<  x   x    |  x   |  0.93    Ca    8.6      25 Jan 2023 06:28    TPro  6.2  /  Alb  3.3  /  TBili  0.3  /  DBili  0.0  /  AST  19  /  ALT  16  /  AlkPhos  81  01-27      PT/INR - ( 27 Jan 2023 06:30 )   PT: 12.0 sec;   INR: 1.03 ratio                                       Culture - Urine (collected 20 Jan 2023 22:15)  Source: Clean Catch Clean Catch (Midstream)  Final Report (22 Jan 2023 18:08):    <10,000 CFU/mL Normal Urogenital Desiree      COVID-19 PCR: Detected (01-25-23 @ 12:00)  COVID-19 PCR: Detected (01-20-23 @ 22:00)      RADIOLOGY & ADDITIONAL TESTS:    Care Discussed with Consultants/Other Providers:    Patient is a 77y old  Female who presents with a chief complaint of weakness, cough (26 Jan 2023 11:33)    Patient seen and examined at bedside.  no acute events overnight.    ALLERGIES:  No Known Allergies        Vital Signs Last 24 Hrs  T(F): 98 (27 Jan 2023 05:04), Max: 98 (26 Jan 2023 20:04)  HR: 65 (27 Jan 2023 05:04) (58 - 83)  BP: 104/54 (27 Jan 2023 05:04) (104/54 - 133/76)  RR: 16 (27 Jan 2023 05:04) (16 - 18)  SpO2: 94% (27 Jan 2023 05:04) (94% - 96%)  I&O's Summary    26 Jan 2023 07:01  -  27 Jan 2023 07:00  --------------------------------------------------------  IN: 960 mL / OUT: 0 mL / NET: 960 mL      MEDICATIONS:  acetaminophen     Tablet .. 650 milliGRAM(s) Oral every 6 hours PRN  aluminum hydroxide/magnesium hydroxide/simethicone Suspension 30 milliLiter(s) Oral every 4 hours PRN  cholecalciferol 2000 Unit(s) Oral daily  clonazePAM  Tablet 0.5 milliGRAM(s) Oral at bedtime PRN  divalproex  milliGRAM(s) Oral at bedtime  donepezil 5 milliGRAM(s) Oral at bedtime  enoxaparin Injectable 40 milliGRAM(s) SubCutaneous every 24 hours  gabapentin 300 milliGRAM(s) Oral two times a day  influenza  Vaccine (HIGH DOSE) 0.7 milliLiter(s) IntraMuscular once  melatonin 3 milliGRAM(s) Oral at bedtime PRN  ondansetron Injectable 4 milliGRAM(s) IV Push every 8 hours PRN  paliperidone ER. 6 milliGRAM(s) Oral daily  pantoprazole    Tablet 40 milliGRAM(s) Oral before breakfast  polyethylene glycol 3350 17 Gram(s) Oral daily  QUEtiapine 250 milliGRAM(s) Oral at bedtime  traZODone 100 milliGRAM(s) Oral at bedtime  zinc sulfate 220 milliGRAM(s) Oral daily      PHYSICAL EXAM:  General: NAD, A/O x 3  ENT: MMM, no thrush  Neck: Supple, No JVD  Lungs: Clear to auscultation bilaterally, non labored, good air entry  Cardio: RRR, S1/S2, No murmurs  Abdomen: Soft, Nontender, Nondistended; Bowel sounds present  Extremities: No cyanosis, No edema    LABS:                        10.5   6.98  )-----------( 253      ( 25 Jan 2023 06:28 )             32.5     01-27    x   |  x   |  x   ----------------------------<  x   x    |  x   |  0.93    Ca    8.6      25 Jan 2023 06:28    TPro  6.2  /  Alb  3.3  /  TBili  0.3  /  DBili  0.0  /  AST  19  /  ALT  16  /  AlkPhos  81  01-27      PT/INR - ( 27 Jan 2023 06:30 )   PT: 12.0 sec;   INR: 1.03 ratio                                       Culture - Urine (collected 20 Jan 2023 22:15)  Source: Clean Catch Clean Catch (Midstream)  Final Report (22 Jan 2023 18:08):    <10,000 CFU/mL Normal Urogenital Desiree      COVID-19 PCR: Detected (01-25-23 @ 12:00)  COVID-19 PCR: Detected (01-20-23 @ 22:00)      RADIOLOGY & ADDITIONAL TESTS:    Care Discussed with Consultants/Other Providers:

## 2023-01-27 NOTE — DISCHARGE NOTE NURSING/CASE MANAGEMENT/SOCIAL WORK - PATIENT PORTAL LINK FT
You can access the FollowMyHealth Patient Portal offered by [facility] by registering at the following website: [email or web address]. By joining [employer]’s FollowMyHealth portal, you will also be able to view your health information using other applications (apps) compatible with our system.

## 2023-05-01 NOTE — H&P ADULT - ASSESSMENT
75F h/o mild dementia, lives in [facility], comes in c/o weakness, lethargy, unsteady gait, incontinent of urine  x 2 days unable to perform ADL'S.  baseline is independent.     encephalopathy, acute change in mental status, lethargy, severe functional decline  likely de to covid 19 infection  admit to medical floor  am labs  remdesvir  no hypoxia- hold off on dexa  iv fluids    anxierty    depression    dementia- aricept    vte ppx-     gi ppx-      General 75F h/o mild anxiety, depression, dementia, lives in [facility], comes in c/o weakness, lethargy, unsteady gait, incontinent of urine  x 2 days unable to perform ADL'S.  baseline is independent.     encephalopathy, acute change in mental status, lethargy, severe functional decline  likely de to covid 19 infection  admit to medical floor  am labs  remdesvir  no hypoxia- hold off on dexa  iv fluids    anxiety- clonopin     depression- Depakote gabapentin, paliperidone, Seroquel     insomnia Trazadone    dementia- Aricept    vte ppx- lovenox    gi ppx- ppi      75F h/o mild anxiety, depression, dementia, lives in [facility], comes in c/o weakness, lethargy, unsteady gait, incontinent of urine  x 2 days unable to perform ADL'S.  baseline is independent.     encephalopathy, acute change in mental status, lethargy, cough, severe functional decline  likely de to covid 19 infection  admit to medical floor  am labs  remdesvir  no hypoxia- hold off on dexa  iv fluids    anxiety- clonopin     depression- Depakote gabapentin, paliperidone, Seroquel     insomnia Trazadone    dementia- Aricept    vte ppx- lovenox    gi ppx- ppi

## 2023-11-21 NOTE — ED ADULT NURSE NOTE - PLAN OF CARE DISCUSSED WITH:
Patient tested by rapid test today (11/21/2023): POSITIVE  Symptomatic for nasal congestion, intermittent non productive cough since the weekend  Nursing reported fall incident and low grade fever: T100. F early today. Had prior COVID-19 test (11/16/2023) and found to be negative (requested due to rhinorrhea)  Patient poor historian due to dementia: denies any acute medical concerns for this visit including pain  Extend Mucinex ER 600mg Q12 hours for a total of 7 days. Check:  - CXR (2 views)  - Labs: CBC with diff and CMP  - UA with C&S  Per nursing, daughter is made aware and agreeable to start COVID-19 protocol x 10 days and anti-viral (daughter prefers use of Paxlovid).   = HOLD use of Melatonin 20mg at HS  V/S daily x 10 days  Start Duo-neb Q12 hours x 5 days  Continue facility protocol for COVID-19 infection  Encourage oral fluids and monitor meal intake  At risk for hospitalization Patient

## 2024-02-23 ENCOUNTER — EMERGENCY (EMERGENCY)
Facility: HOSPITAL | Age: 79
LOS: 1 days | Discharge: ANOTHER HOSPITAL | End: 2024-02-23
Attending: EMERGENCY MEDICINE | Admitting: EMERGENCY MEDICINE
Payer: MEDICARE

## 2024-02-23 VITALS
HEART RATE: 80 BPM | SYSTOLIC BLOOD PRESSURE: 142 MMHG | DIASTOLIC BLOOD PRESSURE: 76 MMHG | RESPIRATION RATE: 18 BRPM | OXYGEN SATURATION: 98 % | TEMPERATURE: 98 F

## 2024-02-23 VITALS
DIASTOLIC BLOOD PRESSURE: 74 MMHG | HEIGHT: 63 IN | TEMPERATURE: 98 F | WEIGHT: 147.93 LBS | RESPIRATION RATE: 18 BRPM | HEART RATE: 81 BPM | OXYGEN SATURATION: 97 % | SYSTOLIC BLOOD PRESSURE: 110 MMHG

## 2024-02-23 LAB
ALBUMIN SERPL ELPH-MCNC: 3.7 G/DL — SIGNIFICANT CHANGE UP (ref 3.3–5)
ALP SERPL-CCNC: 82 U/L — SIGNIFICANT CHANGE UP (ref 40–120)
ALT FLD-CCNC: 18 U/L — SIGNIFICANT CHANGE UP (ref 10–45)
ANION GAP SERPL CALC-SCNC: 11 MMOL/L — SIGNIFICANT CHANGE UP (ref 5–17)
AST SERPL-CCNC: 13 U/L — SIGNIFICANT CHANGE UP (ref 10–40)
BILIRUB SERPL-MCNC: 0.2 MG/DL — SIGNIFICANT CHANGE UP (ref 0.2–1.2)
BUN SERPL-MCNC: 14 MG/DL — SIGNIFICANT CHANGE UP (ref 7–23)
CALCIUM SERPL-MCNC: 8.7 MG/DL — SIGNIFICANT CHANGE UP (ref 8.4–10.5)
CHLORIDE SERPL-SCNC: 102 MMOL/L — SIGNIFICANT CHANGE UP (ref 96–108)
CO2 SERPL-SCNC: 26 MMOL/L — SIGNIFICANT CHANGE UP (ref 22–31)
CREAT SERPL-MCNC: 0.9 MG/DL — SIGNIFICANT CHANGE UP (ref 0.5–1.3)
EGFR: 67 ML/MIN/1.73M2 — SIGNIFICANT CHANGE UP
GLUCOSE SERPL-MCNC: 92 MG/DL — SIGNIFICANT CHANGE UP (ref 70–99)
HCT VFR BLD CALC: 34.7 % — SIGNIFICANT CHANGE UP (ref 34.5–45)
HGB BLD-MCNC: 12 G/DL — SIGNIFICANT CHANGE UP (ref 11.5–15.5)
MCHC RBC-ENTMCNC: 31.9 PG — SIGNIFICANT CHANGE UP (ref 27–34)
MCHC RBC-ENTMCNC: 34.6 GM/DL — SIGNIFICANT CHANGE UP (ref 32–36)
MCV RBC AUTO: 92.3 FL — SIGNIFICANT CHANGE UP (ref 80–100)
NRBC # BLD: 0 /100 WBCS — SIGNIFICANT CHANGE UP (ref 0–0)
PLATELET # BLD AUTO: 314 K/UL — SIGNIFICANT CHANGE UP (ref 150–400)
POTASSIUM SERPL-MCNC: 4.7 MMOL/L — SIGNIFICANT CHANGE UP (ref 3.5–5.3)
POTASSIUM SERPL-SCNC: 4.7 MMOL/L — SIGNIFICANT CHANGE UP (ref 3.5–5.3)
PROT SERPL-MCNC: 6.9 G/DL — SIGNIFICANT CHANGE UP (ref 6–8.3)
RBC # BLD: 3.76 M/UL — LOW (ref 3.8–5.2)
RBC # FLD: 12.8 % — SIGNIFICANT CHANGE UP (ref 10.3–14.5)
SODIUM SERPL-SCNC: 139 MMOL/L — SIGNIFICANT CHANGE UP (ref 135–145)
TSH SERPL-MCNC: 2.1 UIU/ML — SIGNIFICANT CHANGE UP (ref 0.36–3.74)
VIT B12 SERPL-MCNC: 424 PG/ML — SIGNIFICANT CHANGE UP (ref 232–1245)
WBC # BLD: 6.83 K/UL — SIGNIFICANT CHANGE UP (ref 3.8–10.5)
WBC # FLD AUTO: 6.83 K/UL — SIGNIFICANT CHANGE UP (ref 3.8–10.5)

## 2024-02-23 PROCEDURE — 82607 VITAMIN B-12: CPT

## 2024-02-23 PROCEDURE — 70450 CT HEAD/BRAIN W/O DYE: CPT | Mod: MC

## 2024-02-23 PROCEDURE — 99285 EMERGENCY DEPT VISIT HI MDM: CPT | Mod: 25

## 2024-02-23 PROCEDURE — 80053 COMPREHEN METABOLIC PANEL: CPT

## 2024-02-23 PROCEDURE — 36415 COLL VENOUS BLD VENIPUNCTURE: CPT

## 2024-02-23 PROCEDURE — 84443 ASSAY THYROID STIM HORMONE: CPT

## 2024-02-23 PROCEDURE — 93010 ELECTROCARDIOGRAM REPORT: CPT

## 2024-02-23 PROCEDURE — 93005 ELECTROCARDIOGRAM TRACING: CPT

## 2024-02-23 PROCEDURE — 70450 CT HEAD/BRAIN W/O DYE: CPT | Mod: 26,MC

## 2024-02-23 PROCEDURE — 99284 EMERGENCY DEPT VISIT MOD MDM: CPT

## 2024-02-23 PROCEDURE — 85027 COMPLETE CBC AUTOMATED: CPT

## 2024-02-23 RX ORDER — BENZTROPINE MESYLATE 1 MG
2 TABLET ORAL ONCE
Refills: 0 | Status: COMPLETED | OUTPATIENT
Start: 2024-02-23 | End: 2024-02-23

## 2024-02-23 RX ADMIN — Medication 2 MILLIGRAM(S): at 11:25

## 2024-02-23 NOTE — ED PROVIDER NOTE - CARE PROVIDER_API CALL
Gage Mike  Psychiatry  [address] [place] [id number]  Phone: [phone]  Fax: [phone]  Follow Up Time:

## 2024-02-23 NOTE — ED PROVIDER NOTE - PHYSICAL EXAMINATION
GENERAL: NAD, lying in bed comfortably  HEAD:  Atraumatic, Normocephalic  EYES: EOMI, PERRLA, conjunctiva and sclera clear  ENT: Moist mucous membranes  NECK: Supple, No JVD  CHEST/LUNG: Clear to auscultation bilaterally; No rales, rhonchi, wheezing, or rubs. Unlabored respirations  HEART: Regular rate and rhythm; No murmurs, rubs, or gallops  ABDOMEN: Bowel sounds present; Soft, Nontender, Nondistended. No hepatomegally  EXTREMITIES:  2+ Peripheral Pulses, brisk capillary refill. No clubbing, cyanosis, or edema. Mild upper extremity tremor noted on exam   NERVOUS SYSTEM:  Alert & Oriented X3, speech clear.   MSK: FROM all 4 extremities, full and equal strength  SKIN: No rashes or lesions

## 2024-02-23 NOTE — ED PROVIDER NOTE - ATTENDING CONTRIBUTION TO CARE
Extrapyramidal symptoms Presentation not consistent with an acute CNS infection, vertebral basilar artery insufficiency, cerebellar hemorrhage or infarction, intracranial mass or bleed.

## 2024-02-23 NOTE — ED PROVIDER NOTE - NS ED ROS FT
CONSTITUTIONAL: No weakness, fevers or chills  EYES/ENT: No visual changes;  No vertigo or throat pain   NECK: No pain or stiffness  RESPIRATORY: No cough, wheezing, hemoptysis; No shortness of breath  CARDIOVASCULAR: No chest pain or palpitations  GASTROINTESTINAL: No abdominal or epigastric pain. No nausea, vomiting, or hematemesis; No diarrhea or constipation. No melena or hematochezia.  GENITOURINARY: No dysuria, frequency or hematuria  NEUROLOGICAL: No numbness or weakness  SKIN: No itching, rashes CONSTITUTIONAL: No weakness, fevers or chills  EYES/ENT: No visual changes;  No vertigo or throat pain   NECK: No pain or stiffness  RESPIRATORY: No cough, wheezing, hemoptysis; No shortness of breath  CARDIOVASCULAR: No chest pain or palpitations  GASTROINTESTINAL: No abdominal or epigastric pain. No nausea, vomiting, or hematemesis; No diarrhea or constipation. No melena or hematochezia.  GENITOURINARY: No dysuria, frequency or hematuria  NEUROLOGICAL: No numbness or weakness. +Generalized body tingling sensation.   EXT- + generalized Body tremor.  SKIN: No itching, rashes

## 2024-02-23 NOTE — ED PROVIDER NOTE - PATIENT PORTAL LINK FT
You can access the FollowMyHealth Patient Portal offered by [facility] by registering at the following website: [email or web address]. By joining [employer]’s FollowMyHealth portal, you will also be able to view your health information using other applications (apps) compatible with our system.

## 2024-02-23 NOTE — ED PROVIDER NOTE - NSFOLLOWUPINSTRUCTIONS_ED_ALL_ED_FT
Extrapyramidal Symptoms    WHAT YOU NEED TO KNOW:    What are extrapyramidal symptoms? Extrapyramidal symptoms (EPS) are side effects of antipsychotic medicines. EPS can cause movement and muscle control problems throughout your body.    What symptoms may I have? Symptoms may be noticed after you take one dose of medicine or after long-term use. You may not be aware of these symptoms, but others close to you may notice any of the following:    Restlessness and nervous energy shown by pacing, marching, shuffling, or foot-tapping    Severe, uncontrolled muscle contractions of your head, neck, trunk, and limbs that cause stiff tongue, twisted neck, or back arching    Tremors, stiff posture, or no arm movement when you walk    Uncontrolled movements of your tongue, jaw, lips, or face, such as pursing, chewing, or frequent eye blinking    Uncontrolled movements of your fingers or toes, head nodding, or pelvic thrusting    Fast, irregular breathing with grunts, gasping, or sighing    Weak voice, drooling, or little or no facial expression  What should I do if I or someone close to me notices that I have symptoms?    Do not stop taking your medicines unless your healthcare provider says it is okay.    Contact your healthcare provider.    Take a list of your medicines with you to your appointment.

## 2024-02-23 NOTE — ED ADULT NURSE NOTE - NSFALLUNIVINTERV_ED_ALL_ED
Bed/Stretcher in lowest position, wheels locked, appropriate side rails in place/Call bell, personal items and telephone in reach/Instruct patient to call for assistance before getting out of bed/chair/stretcher/Non-slip footwear applied when patient is off stretcher/[place] to call system/Physically safe environment - no spills, clutter or unnecessary equipment/Purposeful proactive rounding/Room/bathroom lighting operational, light cord in reach

## 2024-02-23 NOTE — ED PROVIDER NOTE - CLINICAL SUMMARY MEDICAL DECISION MAKING FREE TEXT BOX
78-year-old female with a history of bipolar disorder, dementia, GERD, arthritis, insomnia, constipation, anxiety, and psychosis presented to the emergency department with a one-day history of body shaking. According to the patient, she has mild body shakes at baseline. However, this morning, the shakes worsened to the point where she was unable to put on her jacket. Additionally, she started feeling a tingling sensation all over her body. The patient mentioned that these symptoms are not new, as she had experienced them several years before and was treated with benztropine. The patient is currently on medication for her psychiatric illness and used to take benztropine in the past; however, it was discontinued several years ago. The patient denied experiencing numbness, headaches, or decreased sensation.    CT head , labs and vitals are wnl. Acute generalized body tremors is probably  due to EPS . Pt received Cogentin and her symptoms improved. Pt was advised to follow-up with the PCP for further care.

## 2024-02-23 NOTE — ED PROVIDER NOTE - OBJECTIVE STATEMENT
78 years old female with history of bipolar 78-year-old female with a history of bipolar disorder, chronic pain, dementia, GERD (gastroesophageal reflux disease), arthritis, insomnia, constipation, anxiety, and psychosis presented to the emergency department with a one-day history of body shaking. According to the patient, she has mild body shakes at baseline. However, this morning, the shakes worsened to the point where she was unable to put on her jacket. Additionally, she started feeling a tingling sensation all over her body. The patient mentioned that these symptoms are not new, as she had experienced them several years before and was treated with benztropine. The patient is currently on medication for her psychiatric illness and used to take benztropine in the past; however, it was discontinued several years ago. The patient denied experiencing numbness, headaches, or decreased sensation. 78-year-old female with a history of bipolar disorder, dementia, GERD, arthritis, insomnia, constipation, anxiety, and psychosis presented to the emergency department with a one-day history of body shaking. According to the patient, she has mild body shakes at baseline. However, this morning, the shakes worsened to the point where she was unable to put on her jacket. Additionally, she started feeling a tingling sensation all over her body. The patient mentioned that these symptoms are not new, as she had experienced them several years before and was treated with benztropine. The patient is currently on medication for her psychiatric illness and used to take benztropine in the past; however, it was discontinued several years ago. The patient denied experiencing numbness, headaches, or decreased sensation.

## 2024-02-23 NOTE — ED PROVIDER NOTE - NS ED ATTENDING STATEMENT MOD
I have seen and examined this patient and fully participated in the care of this patient as the teaching attending.  The service was shared with the GUILLERMO.  I reviewed and verified the documentation.

## 2024-04-04 ENCOUNTER — APPOINTMENT (OUTPATIENT)
Dept: FAMILY MEDICINE | Facility: CLINIC | Age: 79
End: 2024-04-04

## 2024-04-25 ENCOUNTER — OFFICE (OUTPATIENT)
Dept: URBAN - METROPOLITAN AREA CLINIC 100 | Facility: CLINIC | Age: 79
Setting detail: OPHTHALMOLOGY
End: 2024-04-25
Payer: COMMERCIAL

## 2024-04-25 DIAGNOSIS — H53.40: ICD-10-CM

## 2024-04-25 DIAGNOSIS — H02.834: ICD-10-CM

## 2024-04-25 DIAGNOSIS — H02.831: ICD-10-CM

## 2024-04-25 PROBLEM — H02.34 BROW PTOSIS; RIGHT UPPER LID, LEFT UPPER LID: Status: ACTIVE | Noted: 2024-04-25

## 2024-04-25 PROBLEM — H52.7 REFRACTIVE ERROR: Status: ACTIVE | Noted: 2024-04-25

## 2024-04-25 PROBLEM — H02.31 BROW PTOSIS; RIGHT UPPER LID, LEFT UPPER LID: Status: ACTIVE | Noted: 2024-04-25

## 2024-04-25 PROCEDURE — 92285 EXTERNAL OCULAR PHOTOGRAPHY: CPT | Performed by: OPHTHALMOLOGY

## 2024-04-25 PROCEDURE — 99204 OFFICE O/P NEW MOD 45 MIN: CPT | Performed by: OPHTHALMOLOGY

## 2024-04-25 PROCEDURE — 92081 LIMITED VISUAL FIELD XM: CPT | Performed by: OPHTHALMOLOGY

## 2024-04-25 ASSESSMENT — LID POSITION - DERMATOCHALASIS
OD_DERMATOCHALASIS: RUL
OS_DERMATOCHALASIS: LUL

## 2024-04-29 ENCOUNTER — EMERGENCY (EMERGENCY)
Facility: HOSPITAL | Age: 79
LOS: 1 days | Discharge: ANOTHER HOSPITAL | End: 2024-04-29
Attending: EMERGENCY MEDICINE | Admitting: EMERGENCY MEDICINE
Payer: MEDICARE

## 2024-04-29 VITALS
SYSTOLIC BLOOD PRESSURE: 149 MMHG | DIASTOLIC BLOOD PRESSURE: 72 MMHG | OXYGEN SATURATION: 100 % | HEART RATE: 98 BPM | RESPIRATION RATE: 18 BRPM | TEMPERATURE: 98 F

## 2024-04-29 VITALS
RESPIRATION RATE: 18 BRPM | OXYGEN SATURATION: 95 % | WEIGHT: 149.91 LBS | DIASTOLIC BLOOD PRESSURE: 84 MMHG | TEMPERATURE: 100 F | HEIGHT: 63 IN | HEART RATE: 99 BPM | SYSTOLIC BLOOD PRESSURE: 136 MMHG

## 2024-04-29 LAB
ALBUMIN SERPL ELPH-MCNC: 3.8 G/DL — SIGNIFICANT CHANGE UP (ref 3.3–5)
ALP SERPL-CCNC: 105 U/L — SIGNIFICANT CHANGE UP (ref 40–120)
ALT FLD-CCNC: 23 U/L — SIGNIFICANT CHANGE UP (ref 10–45)
ANION GAP SERPL CALC-SCNC: 12 MMOL/L — SIGNIFICANT CHANGE UP (ref 5–17)
APPEARANCE UR: CLEAR — SIGNIFICANT CHANGE UP
AST SERPL-CCNC: 22 U/L — SIGNIFICANT CHANGE UP (ref 10–40)
BACTERIA # UR AUTO: NEGATIVE /HPF — SIGNIFICANT CHANGE UP
BASOPHILS # BLD AUTO: 0.05 K/UL — SIGNIFICANT CHANGE UP (ref 0–0.2)
BASOPHILS NFR BLD AUTO: 0.5 % — SIGNIFICANT CHANGE UP (ref 0–2)
BILIRUB SERPL-MCNC: 0.3 MG/DL — SIGNIFICANT CHANGE UP (ref 0.2–1.2)
BILIRUB UR-MCNC: NEGATIVE — SIGNIFICANT CHANGE UP
BUN SERPL-MCNC: 12 MG/DL — SIGNIFICANT CHANGE UP (ref 7–23)
CALCIUM SERPL-MCNC: 9.2 MG/DL — SIGNIFICANT CHANGE UP (ref 8.4–10.5)
CHLORIDE SERPL-SCNC: 100 MMOL/L — SIGNIFICANT CHANGE UP (ref 96–108)
CO2 SERPL-SCNC: 27 MMOL/L — SIGNIFICANT CHANGE UP (ref 22–31)
COLOR SPEC: YELLOW — SIGNIFICANT CHANGE UP
CREAT SERPL-MCNC: 0.86 MG/DL — SIGNIFICANT CHANGE UP (ref 0.5–1.3)
DIFF PNL FLD: NEGATIVE — SIGNIFICANT CHANGE UP
EGFR: 69 ML/MIN/1.73M2 — SIGNIFICANT CHANGE UP
EOSINOPHIL # BLD AUTO: 0.02 K/UL — SIGNIFICANT CHANGE UP (ref 0–0.5)
EOSINOPHIL NFR BLD AUTO: 0.2 % — SIGNIFICANT CHANGE UP (ref 0–6)
EPI CELLS # UR: 3 — SIGNIFICANT CHANGE UP
FLUAV AG NPH QL: SIGNIFICANT CHANGE UP
FLUBV AG NPH QL: SIGNIFICANT CHANGE UP
GLUCOSE SERPL-MCNC: 105 MG/DL — HIGH (ref 70–99)
GLUCOSE UR QL: NEGATIVE MG/DL — SIGNIFICANT CHANGE UP
HCT VFR BLD CALC: 35.2 % — SIGNIFICANT CHANGE UP (ref 34.5–45)
HCV AB S/CO SERPL IA: 0.07 S/CO — SIGNIFICANT CHANGE UP (ref 0–0.99)
HCV AB SERPL-IMP: SIGNIFICANT CHANGE UP
HGB BLD-MCNC: 12 G/DL — SIGNIFICANT CHANGE UP (ref 11.5–15.5)
IMM GRANULOCYTES NFR BLD AUTO: 0.5 % — SIGNIFICANT CHANGE UP (ref 0–0.9)
KETONES UR-MCNC: NEGATIVE MG/DL — SIGNIFICANT CHANGE UP
LACTATE SERPL-SCNC: 1.7 MMOL/L — SIGNIFICANT CHANGE UP (ref 0.7–2)
LEUKOCYTE ESTERASE UR-ACNC: ABNORMAL
LIDOCAIN IGE QN: 31 U/L — SIGNIFICANT CHANGE UP (ref 16–77)
LYMPHOCYTES # BLD AUTO: 0.78 K/UL — LOW (ref 1–3.3)
LYMPHOCYTES # BLD AUTO: 8 % — LOW (ref 13–44)
MCHC RBC-ENTMCNC: 31.6 PG — SIGNIFICANT CHANGE UP (ref 27–34)
MCHC RBC-ENTMCNC: 34.1 GM/DL — SIGNIFICANT CHANGE UP (ref 32–36)
MCV RBC AUTO: 92.6 FL — SIGNIFICANT CHANGE UP (ref 80–100)
MONOCYTES # BLD AUTO: 0.71 K/UL — SIGNIFICANT CHANGE UP (ref 0–0.9)
MONOCYTES NFR BLD AUTO: 7.3 % — SIGNIFICANT CHANGE UP (ref 2–14)
NEUTROPHILS # BLD AUTO: 8.17 K/UL — HIGH (ref 1.8–7.4)
NEUTROPHILS NFR BLD AUTO: 83.5 % — HIGH (ref 43–77)
NITRITE UR-MCNC: NEGATIVE — SIGNIFICANT CHANGE UP
NRBC # BLD: 0 /100 WBCS — SIGNIFICANT CHANGE UP (ref 0–0)
PH UR: 8 — SIGNIFICANT CHANGE UP (ref 5–8)
PLATELET # BLD AUTO: 263 K/UL — SIGNIFICANT CHANGE UP (ref 150–400)
POTASSIUM SERPL-MCNC: 4.6 MMOL/L — SIGNIFICANT CHANGE UP (ref 3.5–5.3)
POTASSIUM SERPL-SCNC: 4.6 MMOL/L — SIGNIFICANT CHANGE UP (ref 3.5–5.3)
PROT SERPL-MCNC: 7.2 G/DL — SIGNIFICANT CHANGE UP (ref 6–8.3)
PROT UR-MCNC: NEGATIVE MG/DL — SIGNIFICANT CHANGE UP
RBC # BLD: 3.8 M/UL — SIGNIFICANT CHANGE UP (ref 3.8–5.2)
RBC # FLD: 12.9 % — SIGNIFICANT CHANGE UP (ref 10.3–14.5)
RBC CASTS # UR COMP ASSIST: 1 /HPF — SIGNIFICANT CHANGE UP (ref 0–4)
RSV RNA NPH QL NAA+NON-PROBE: SIGNIFICANT CHANGE UP
SARS-COV-2 RNA SPEC QL NAA+PROBE: DETECTED
SODIUM SERPL-SCNC: 139 MMOL/L — SIGNIFICANT CHANGE UP (ref 135–145)
SP GR SPEC: 1.01 — SIGNIFICANT CHANGE UP (ref 1–1.03)
TROPONIN I, HIGH SENSITIVITY RESULT: 5.2 NG/L — SIGNIFICANT CHANGE UP
UROBILINOGEN FLD QL: 0.2 MG/DL — SIGNIFICANT CHANGE UP (ref 0.2–1)
WBC # BLD: 9.78 K/UL — SIGNIFICANT CHANGE UP (ref 3.8–10.5)
WBC # FLD AUTO: 9.78 K/UL — SIGNIFICANT CHANGE UP (ref 3.8–10.5)
WBC UR QL: 6 /HPF — HIGH (ref 0–5)

## 2024-04-29 PROCEDURE — 99285 EMERGENCY DEPT VISIT HI MDM: CPT | Mod: 25

## 2024-04-29 PROCEDURE — 72125 CT NECK SPINE W/O DYE: CPT | Mod: MC

## 2024-04-29 PROCEDURE — 87637 SARSCOV2&INF A&B&RSV AMP PRB: CPT

## 2024-04-29 PROCEDURE — 36415 COLL VENOUS BLD VENIPUNCTURE: CPT

## 2024-04-29 PROCEDURE — 86803 HEPATITIS C AB TEST: CPT

## 2024-04-29 PROCEDURE — 93005 ELECTROCARDIOGRAM TRACING: CPT

## 2024-04-29 PROCEDURE — 71045 X-RAY EXAM CHEST 1 VIEW: CPT | Mod: 26

## 2024-04-29 PROCEDURE — 71045 X-RAY EXAM CHEST 1 VIEW: CPT

## 2024-04-29 PROCEDURE — 85025 COMPLETE CBC W/AUTO DIFF WBC: CPT

## 2024-04-29 PROCEDURE — 70450 CT HEAD/BRAIN W/O DYE: CPT | Mod: 26,MC

## 2024-04-29 PROCEDURE — 83605 ASSAY OF LACTIC ACID: CPT

## 2024-04-29 PROCEDURE — 87086 URINE CULTURE/COLONY COUNT: CPT

## 2024-04-29 PROCEDURE — 99285 EMERGENCY DEPT VISIT HI MDM: CPT

## 2024-04-29 PROCEDURE — 93010 ELECTROCARDIOGRAM REPORT: CPT

## 2024-04-29 PROCEDURE — 72125 CT NECK SPINE W/O DYE: CPT | Mod: 26,MC

## 2024-04-29 PROCEDURE — 70450 CT HEAD/BRAIN W/O DYE: CPT | Mod: MC

## 2024-04-29 PROCEDURE — 80053 COMPREHEN METABOLIC PANEL: CPT

## 2024-04-29 PROCEDURE — 81001 URINALYSIS AUTO W/SCOPE: CPT

## 2024-04-29 PROCEDURE — 84484 ASSAY OF TROPONIN QUANT: CPT

## 2024-04-29 PROCEDURE — 83690 ASSAY OF LIPASE: CPT

## 2024-04-29 PROCEDURE — 87040 BLOOD CULTURE FOR BACTERIA: CPT

## 2024-04-29 RX ORDER — CEFUROXIME AXETIL 250 MG
1 TABLET ORAL
Qty: 20 | Refills: 0
Start: 2024-04-29 | End: 2024-05-08

## 2024-04-29 RX ORDER — CEFUROXIME AXETIL 250 MG
250 TABLET ORAL ONCE
Refills: 0 | Status: COMPLETED | OUTPATIENT
Start: 2024-04-29 | End: 2024-04-29

## 2024-04-29 RX ORDER — ACETAMINOPHEN 500 MG
650 TABLET ORAL ONCE
Refills: 0 | Status: COMPLETED | OUTPATIENT
Start: 2024-04-29 | End: 2024-04-29

## 2024-04-29 RX ORDER — SODIUM CHLORIDE 9 MG/ML
1000 INJECTION INTRAMUSCULAR; INTRAVENOUS; SUBCUTANEOUS ONCE
Refills: 0 | Status: COMPLETED | OUTPATIENT
Start: 2024-04-29 | End: 2024-04-29

## 2024-04-29 RX ORDER — NIRMATRELVIR AND RITONAVIR 150-100 MG
3 KIT ORAL
Qty: 30 | Refills: 0
Start: 2024-04-29 | End: 2024-05-03

## 2024-04-29 RX ADMIN — SODIUM CHLORIDE 1000 MILLILITER(S): 9 INJECTION INTRAMUSCULAR; INTRAVENOUS; SUBCUTANEOUS at 05:47

## 2024-04-29 RX ADMIN — Medication 650 MILLIGRAM(S): at 05:47

## 2024-04-29 RX ADMIN — Medication 250 MILLIGRAM(S): at 11:57

## 2024-04-29 NOTE — ED ADULT NURSE NOTE - OBJECTIVE STATEMENT
Pt aaox4, BIBA from home complaining of a unwitnessed fall this morning in her bedroom. Pt denies hitting her head. Pt complained of neck pain 3/10. Denies LOC, dizziness, headache.

## 2024-04-29 NOTE — ED ADULT NURSE NOTE - NSFALLHARMRISKINTERV_ED_ALL_ED
Assistance OOB with selected safe patient handling equipment if applicable/Assistance with ambulation/Communicate risk of Fall with Harm to all staff, patient, and family/Monitor gait and stability/Provide visual cue: red socks, yellow wristband, yellow gown, etc/Reinforce activity limits and safety measures with patient and family/Bed in lowest position, wheels locked, appropriate side rails in place/Call bell, personal items and telephone in reach/Instruct patient to call for assistance before getting out of bed/chair/stretcher/Non-slip footwear applied when patient is off stretcher/[place] to call system/Physically safe environment - no spills, clutter or unnecessary equipment/Purposeful Proactive Rounding/Room/bathroom lighting operational, light cord in reach

## 2024-04-29 NOTE — ED PROVIDER NOTE - PROGRESS NOTE DETAILS
Patient was informed that she tested positive for COVID and as well as UTI.  Patient was instructed not to take Paxlovid with her medication such as trazodone, quetiapine and atorvastatin

## 2024-04-29 NOTE — ED PROVIDER NOTE - PATIENT PORTAL LINK FT
You can access the FollowMyHealth Patient Portal offered by [facility] by registering at the following website: [email or web address]. By joining [employer]’s FollowMyHealth portal, you will also be able to view your health information using other applications (apps) compatible with our system.

## 2024-04-29 NOTE — ED ADULT TRIAGE NOTE - CHIEF COMPLAINT QUOTE
BIBEMS from [facility] s/p fall out of bed. Pt. was trying to reach her tv remote that fell on floor and fell. On ASA for blood thinner. Pt. c/o neck pain, back pain and right lower extremity pain.

## 2024-04-29 NOTE — ED PROVIDER NOTE - OBJECTIVE STATEMENT
Patient brought in by EMS [facility] [place] [facility].  Patient has history of dementia and schizophrenia.  Today patient states that she went to  her remote control and fell, she is not sure why.  Patient states that she also feels feverish and has a mild cough.  Patient also reports frequent urination with some burning.  Patient complaining of mild neck pain but states can move her neck easily.  Patient denies any weakness or numbness or tingling to the arms or legs.  Patient denies any headache.

## 2024-04-29 NOTE — ED PROVIDER NOTE - CARE PLAN
Principal Discharge DX:	Fever  Secondary Diagnosis:	Fall   1 Principal Discharge DX:	Fever  Secondary Diagnosis:	Fall  Secondary Diagnosis:	2019 novel coronavirus disease (COVID-19)  Secondary Diagnosis:	Acute UTI

## 2024-04-30 LAB
CULTURE RESULTS: SIGNIFICANT CHANGE UP
SPECIMEN SOURCE: SIGNIFICANT CHANGE UP

## 2024-05-01 ENCOUNTER — OFFICE (OUTPATIENT)
Dept: URBAN - METROPOLITAN AREA CLINIC 109 | Facility: CLINIC | Age: 79
Setting detail: OPHTHALMOLOGY
End: 2024-05-01
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
## 2024-05-01 PROCEDURE — NO SHOW FE NO SHOW FEE: Performed by: OPHTHALMOLOGY

## 2024-05-02 ENCOUNTER — APPOINTMENT (OUTPATIENT)
Dept: ORTHOPEDIC SURGERY | Facility: CLINIC | Age: 79
End: 2024-05-02

## 2024-05-20 ENCOUNTER — APPOINTMENT (OUTPATIENT)
Dept: ORTHOPEDIC SURGERY | Facility: CLINIC | Age: 79
End: 2024-05-20
Payer: MEDICARE

## 2024-05-20 VITALS — HEIGHT: 62.5 IN | WEIGHT: 149 LBS | BODY MASS INDEX: 26.74 KG/M2

## 2024-05-20 DIAGNOSIS — M17.0 BILATERAL PRIMARY OSTEOARTHRITIS OF KNEE: ICD-10-CM

## 2024-05-20 PROCEDURE — 73564 X-RAY EXAM KNEE 4 OR MORE: CPT | Mod: 50

## 2024-05-20 PROCEDURE — 20610 DRAIN/INJ JOINT/BURSA W/O US: CPT | Mod: 50

## 2024-05-20 PROCEDURE — 99204 OFFICE O/P NEW MOD 45 MIN: CPT | Mod: 25

## 2024-05-20 RX ORDER — ESCITALOPRAM OXALATE 10 MG/1
10 TABLET, FILM COATED ORAL
Refills: 0 | Status: DISCONTINUED | COMMUNITY
End: 2024-05-20

## 2024-10-08 NOTE — PATIENT PROFILE ADULT - LEGAL HELP
no
76 year old male with HTN, prostate Ca, asthma, diverticulosis with hx of diverticular bleeding, aflutter s/p ablation, watchman procedure (c/b leak, has remained on Eliquis), now presenting with BRBPR. Initially in the ED, hypothermic to 94.7, hypotension to SBP 70s. S/p 1 pRBC, 1g acetaminophen. Hg 12.3 -> 11.9. Lactate normal. BP improved and now normothermic. CTA without sign of active bleed, +diverse colonic diverticulosis. Suspected most likely diverticular bleed. Appreciate GI and EP consultations. Currently AC on hold. Patient seen with HS team, plan discussed.

## 2025-02-03 ENCOUNTER — APPOINTMENT (OUTPATIENT)
Dept: ORTHOPEDIC SURGERY | Facility: CLINIC | Age: 80
End: 2025-02-03
Payer: MEDICARE

## 2025-02-03 ENCOUNTER — NON-APPOINTMENT (OUTPATIENT)
Age: 80
End: 2025-02-03

## 2025-02-03 VITALS — BODY MASS INDEX: 27.42 KG/M2 | WEIGHT: 149 LBS | HEIGHT: 62 IN

## 2025-02-03 DIAGNOSIS — M17.0 BILATERAL PRIMARY OSTEOARTHRITIS OF KNEE: ICD-10-CM

## 2025-02-03 PROCEDURE — 99214 OFFICE O/P EST MOD 30 MIN: CPT

## 2025-02-24 ENCOUNTER — OFFICE (OUTPATIENT)
Dept: URBAN - METROPOLITAN AREA CLINIC 63 | Facility: CLINIC | Age: 80
Setting detail: OPHTHALMOLOGY
End: 2025-02-24
Payer: COMMERCIAL

## 2025-02-24 DIAGNOSIS — H02.831: ICD-10-CM

## 2025-02-24 DIAGNOSIS — H16.223: ICD-10-CM

## 2025-02-24 DIAGNOSIS — H25.813: ICD-10-CM

## 2025-02-24 DIAGNOSIS — H02.834: ICD-10-CM

## 2025-02-24 PROCEDURE — 92014 COMPRE OPH EXAM EST PT 1/>: CPT | Performed by: INTERNAL MEDICINE

## 2025-02-24 ASSESSMENT — TONOMETRY
OD_IOP_MMHG: 17
OS_IOP_MMHG: 17

## 2025-02-24 ASSESSMENT — LID POSITION - DERMATOCHALASIS
OS_DERMATOCHALASIS: LUL
OD_DERMATOCHALASIS: RUL

## 2025-02-24 ASSESSMENT — TEAR BREAK UP TIME (TBUT)
OS_TBUT: 2+ 3+
OD_TBUT: 2+ 3+

## 2025-02-24 ASSESSMENT — SUPERFICIAL PUNCTATE KERATITIS (SPK)
OD_SPK: 3+
OS_SPK: 3+

## 2025-02-24 ASSESSMENT — CONFRONTATIONAL VISUAL FIELD TEST (CVF)
OD_FINDINGS: FULL
OS_FINDINGS: FULL

## 2025-02-26 ENCOUNTER — RX ONLY (RX ONLY)
Age: 80
End: 2025-02-26

## 2025-02-26 ASSESSMENT — REFRACTION_MANIFEST
OS_SPHERE: +2.75
OD_VA2: 20/30(J2)
OS_ADD: +2.75
OU_VA: 20/40-1
OD_CYLINDER: -2.75
OD_VA1: 20/40-1
OS_VA2: 20/30(J2)
OS_AXIS: 101
OS_CYLINDER: -1.25
OD_AXIS: 080
OS_VA1: 20/50
OD_ADD: +2.75
OD_SPHERE: +3.75

## 2025-02-26 ASSESSMENT — KERATOMETRY
OS_K1POWER_DIOPTERS: 43.00
OS_K2POWER_DIOPTERS: 43.75
OD_AXISANGLE_DEGREES: 168
OS_AXISANGLE_DEGREES: 168
OD_K2POWER_DIOPTERS: 43.50
OD_K1POWER_DIOPTERS: 42.50

## 2025-02-26 ASSESSMENT — VISUAL ACUITY
OD_BCVA: 20/50-1
OS_BCVA: 20/70-1

## 2025-02-26 ASSESSMENT — REFRACTION_AUTOREFRACTION
OD_SPHERE: +3.75
OS_AXIS: 101
OD_AXIS: 080
OD_CYLINDER: -2.75
OS_CYLINDER: -1.25
OS_SPHERE: +3.25

## 2025-02-27 NOTE — ED PROVIDER NOTE - PHYSICAL EXAMINATION
Gen: alert, NAD  HEENT:  NC/AT, PERR  CV:  well perfused  Pulm:  normal RR, breathing comfortably, CTA b/l  Abd: s/nt/nd  MSK: moving all extremities  Neuro:  non-focal  Skin:  visualized areas intact  Psych: AOx3
None

## 2025-03-04 ENCOUNTER — INPATIENT (INPATIENT)
Facility: HOSPITAL | Age: 80
LOS: 1 days | Discharge: NURSING, REHAB OR LONG-TERM CARE | DRG: 871 | End: 2025-03-06
Attending: INTERNAL MEDICINE | Admitting: STUDENT IN AN ORGANIZED HEALTH CARE EDUCATION/TRAINING PROGRAM
Payer: MEDICARE

## 2025-03-04 VITALS
RESPIRATION RATE: 20 BRPM | HEART RATE: 123 BPM | HEIGHT: 63 IN | OXYGEN SATURATION: 94 % | SYSTOLIC BLOOD PRESSURE: 128 MMHG | TEMPERATURE: 102 F | WEIGHT: 149.91 LBS | DIASTOLIC BLOOD PRESSURE: 96 MMHG

## 2025-03-04 DIAGNOSIS — J10.00 INFLUENZA DUE TO OTHER IDENTIFIED INFLUENZA VIRUS WITH UNSPECIFIED TYPE OF PNEUMONIA: ICD-10-CM

## 2025-03-04 LAB
ALBUMIN SERPL ELPH-MCNC: 3.6 G/DL — SIGNIFICANT CHANGE UP (ref 3.3–5)
ALP SERPL-CCNC: 99 U/L — SIGNIFICANT CHANGE UP (ref 40–120)
ALT FLD-CCNC: 27 U/L — SIGNIFICANT CHANGE UP (ref 10–45)
ANION GAP SERPL CALC-SCNC: 8 MMOL/L — SIGNIFICANT CHANGE UP (ref 5–17)
APPEARANCE UR: CLEAR — SIGNIFICANT CHANGE UP
AST SERPL-CCNC: 23 U/L — SIGNIFICANT CHANGE UP (ref 10–40)
BACTERIA # UR AUTO: NEGATIVE /HPF — SIGNIFICANT CHANGE UP
BASE EXCESS BLDV CALC-SCNC: -2.6 MMOL/L — LOW (ref -2–3)
BASOPHILS # BLD AUTO: 0.03 K/UL — SIGNIFICANT CHANGE UP (ref 0–0.2)
BASOPHILS NFR BLD AUTO: 0.2 % — SIGNIFICANT CHANGE UP (ref 0–2)
BILIRUB SERPL-MCNC: 0.3 MG/DL — SIGNIFICANT CHANGE UP (ref 0.2–1.2)
BILIRUB UR-MCNC: NEGATIVE — SIGNIFICANT CHANGE UP
BLOOD GAS COMMENTS, VENOUS: SIGNIFICANT CHANGE UP
BUN SERPL-MCNC: 20 MG/DL — SIGNIFICANT CHANGE UP (ref 7–23)
CALCIUM SERPL-MCNC: 8.8 MG/DL — SIGNIFICANT CHANGE UP (ref 8.4–10.5)
CHLORIDE SERPL-SCNC: 95 MMOL/L — LOW (ref 96–108)
CO2 BLDV-SCNC: 24 MMOL/L — SIGNIFICANT CHANGE UP (ref 22–26)
CO2 SERPL-SCNC: 26 MMOL/L — SIGNIFICANT CHANGE UP (ref 22–31)
COLOR SPEC: YELLOW — SIGNIFICANT CHANGE UP
CREAT SERPL-MCNC: 0.9 MG/DL — SIGNIFICANT CHANGE UP (ref 0.5–1.3)
DIFF PNL FLD: NEGATIVE — SIGNIFICANT CHANGE UP
EGFR: 65 ML/MIN/1.73M2 — SIGNIFICANT CHANGE UP
EGFR: 65 ML/MIN/1.73M2 — SIGNIFICANT CHANGE UP
EOSINOPHIL # BLD AUTO: 0 K/UL — SIGNIFICANT CHANGE UP (ref 0–0.5)
EOSINOPHIL NFR BLD AUTO: 0 % — SIGNIFICANT CHANGE UP (ref 0–6)
FLUAV AG NPH QL: DETECTED
FLUBV AG NPH QL: SIGNIFICANT CHANGE UP
GAS PNL BLDV: SIGNIFICANT CHANGE UP
GLUCOSE SERPL-MCNC: 169 MG/DL — HIGH (ref 70–99)
GLUCOSE UR QL: NEGATIVE MG/DL — SIGNIFICANT CHANGE UP
HCO3 BLDV-SCNC: 22 MMOL/L — SIGNIFICANT CHANGE UP (ref 22–29)
HCT VFR BLD CALC: 32.9 % — LOW (ref 34.5–45)
HGB BLD-MCNC: 11 G/DL — LOW (ref 11.5–15.5)
IMM GRANULOCYTES NFR BLD AUTO: 0.5 % — SIGNIFICANT CHANGE UP (ref 0–0.9)
KETONES UR-MCNC: NEGATIVE MG/DL — SIGNIFICANT CHANGE UP
LACTATE SERPL-SCNC: 2 MMOL/L — SIGNIFICANT CHANGE UP (ref 0.7–2)
LEUKOCYTE ESTERASE UR-ACNC: ABNORMAL
LYMPHOCYTES # BLD AUTO: 0.45 K/UL — LOW (ref 1–3.3)
LYMPHOCYTES # BLD AUTO: 2.7 % — LOW (ref 13–44)
MCHC RBC-ENTMCNC: 31.1 PG — SIGNIFICANT CHANGE UP (ref 27–34)
MCHC RBC-ENTMCNC: 33.4 G/DL — SIGNIFICANT CHANGE UP (ref 32–36)
MCV RBC AUTO: 92.9 FL — SIGNIFICANT CHANGE UP (ref 80–100)
MONOCYTES # BLD AUTO: 0.92 K/UL — HIGH (ref 0–0.9)
MONOCYTES NFR BLD AUTO: 5.5 % — SIGNIFICANT CHANGE UP (ref 2–14)
NEUTROPHILS # BLD AUTO: 15.19 K/UL — HIGH (ref 1.8–7.4)
NEUTROPHILS NFR BLD AUTO: 91.1 % — HIGH (ref 43–77)
NITRITE UR-MCNC: NEGATIVE — SIGNIFICANT CHANGE UP
NRBC BLD AUTO-RTO: 0 /100 WBCS — SIGNIFICANT CHANGE UP (ref 0–0)
PCO2 BLDV: 38 MMHG — LOW (ref 39–42)
PH BLDV: 7.38 — SIGNIFICANT CHANGE UP (ref 7.32–7.43)
PH UR: 7.5 — SIGNIFICANT CHANGE UP (ref 5–8)
PLATELET # BLD AUTO: 256 K/UL — SIGNIFICANT CHANGE UP (ref 150–400)
PO2 BLDV: <35 MMHG — SIGNIFICANT CHANGE UP (ref 25–45)
POTASSIUM SERPL-MCNC: 3.9 MMOL/L — SIGNIFICANT CHANGE UP (ref 3.5–5.3)
POTASSIUM SERPL-SCNC: 3.9 MMOL/L — SIGNIFICANT CHANGE UP (ref 3.5–5.3)
PROT SERPL-MCNC: 7.1 G/DL — SIGNIFICANT CHANGE UP (ref 6–8.3)
PROT UR-MCNC: SIGNIFICANT CHANGE UP MG/DL
RBC # BLD: 3.54 M/UL — LOW (ref 3.8–5.2)
RBC # FLD: 13.4 % — SIGNIFICANT CHANGE UP (ref 10.3–14.5)
RBC CASTS # UR COMP ASSIST: 0 /HPF — SIGNIFICANT CHANGE UP (ref 0–4)
RSV RNA NPH QL NAA+NON-PROBE: SIGNIFICANT CHANGE UP
SAO2 % BLDV: 52.8 % — LOW (ref 67–88)
SARS-COV-2 RNA SPEC QL NAA+PROBE: SIGNIFICANT CHANGE UP
SODIUM SERPL-SCNC: 129 MMOL/L — LOW (ref 135–145)
SP GR SPEC: 1.02 — SIGNIFICANT CHANGE UP (ref 1–1.03)
UROBILINOGEN FLD QL: 1 MG/DL — SIGNIFICANT CHANGE UP (ref 0.2–1)
WBC # BLD: 16.68 K/UL — HIGH (ref 3.8–10.5)
WBC # FLD AUTO: 16.68 K/UL — HIGH (ref 3.8–10.5)
WBC UR QL: 0 /HPF — SIGNIFICANT CHANGE UP (ref 0–5)

## 2025-03-04 RX ORDER — OSELTAMIVIR PHOSPHATE 75 MG/1
75 CAPSULE ORAL
Refills: 0 | Status: DISCONTINUED | OUTPATIENT
Start: 2025-03-04 | End: 2025-03-06

## 2025-03-04 RX ORDER — ACETAMINOPHEN 500 MG/5ML
650 LIQUID (ML) ORAL ONCE
Refills: 0 | Status: COMPLETED | OUTPATIENT
Start: 2025-03-04 | End: 2025-03-04

## 2025-03-04 RX ORDER — CEFTRIAXONE 500 MG/1
1000 INJECTION, POWDER, FOR SOLUTION INTRAMUSCULAR; INTRAVENOUS EVERY 24 HOURS
Refills: 0 | Status: DISCONTINUED | OUTPATIENT
Start: 2025-03-04 | End: 2025-03-06

## 2025-03-04 RX ORDER — ACETAMINOPHEN 500 MG/5ML
650 LIQUID (ML) ORAL EVERY 6 HOURS
Refills: 0 | Status: DISCONTINUED | OUTPATIENT
Start: 2025-03-04 | End: 2025-03-06

## 2025-03-04 RX ORDER — CEFTRIAXONE 500 MG/1
1000 INJECTION, POWDER, FOR SOLUTION INTRAMUSCULAR; INTRAVENOUS ONCE
Refills: 0 | Status: COMPLETED | OUTPATIENT
Start: 2025-03-04 | End: 2025-03-04

## 2025-03-04 RX ORDER — AZITHROMYCIN 250 MG
500 CAPSULE ORAL EVERY 24 HOURS
Refills: 0 | Status: DISCONTINUED | OUTPATIENT
Start: 2025-03-04 | End: 2025-03-06

## 2025-03-04 RX ORDER — MAGNESIUM, ALUMINUM HYDROXIDE 200-200 MG
30 TABLET,CHEWABLE ORAL EVERY 4 HOURS
Refills: 0 | Status: DISCONTINUED | OUTPATIENT
Start: 2025-03-04 | End: 2025-03-06

## 2025-03-04 RX ORDER — ONDANSETRON HCL/PF 4 MG/2 ML
4 VIAL (ML) INJECTION EVERY 8 HOURS
Refills: 0 | Status: DISCONTINUED | OUTPATIENT
Start: 2025-03-04 | End: 2025-03-06

## 2025-03-04 RX ORDER — MELATONIN 5 MG
3 TABLET ORAL AT BEDTIME
Refills: 0 | Status: DISCONTINUED | OUTPATIENT
Start: 2025-03-04 | End: 2025-03-06

## 2025-03-04 RX ORDER — AZITHROMYCIN 250 MG
500 CAPSULE ORAL ONCE
Refills: 0 | Status: COMPLETED | OUTPATIENT
Start: 2025-03-04 | End: 2025-03-04

## 2025-03-04 RX ADMIN — Medication 650 MILLIGRAM(S): at 20:59

## 2025-03-04 RX ADMIN — Medication 1000 MILLILITER(S): at 22:32

## 2025-03-04 RX ADMIN — Medication 650 MILLIGRAM(S): at 22:32

## 2025-03-04 RX ADMIN — Medication 1000 MILLILITER(S): at 22:47

## 2025-03-04 RX ADMIN — Medication 500 MILLIGRAM(S): at 22:47

## 2025-03-04 RX ADMIN — CEFTRIAXONE 1000 MILLIGRAM(S): 500 INJECTION, POWDER, FOR SOLUTION INTRAMUSCULAR; INTRAVENOUS at 22:02

## 2025-03-04 RX ADMIN — CEFTRIAXONE 100 MILLIGRAM(S): 500 INJECTION, POWDER, FOR SOLUTION INTRAMUSCULAR; INTRAVENOUS at 21:36

## 2025-03-04 RX ADMIN — Medication 250 MILLIGRAM(S): at 21:57

## 2025-03-04 RX ADMIN — Medication 1000 MILLILITER(S): at 20:59

## 2025-03-04 NOTE — ED PROVIDER NOTE - CLINICAL SUMMARY MEDICAL DECISION MAKING FREE TEXT BOX
pt with likely viral pna 2/2 influenza A infection, will need admission for oxygen requirement and further monitoring and treatment.

## 2025-03-04 NOTE — H&P ADULT - HISTORY OF PRESENT ILLNESS
81 y/o female with PMHx of HLD, hypothyroidism, anxiety, depression, bipolar disorder, chronic pain, dementia, overactive bladder presents to ED from [place] for cough, congestion, fever for the last 2 days. Endorses total body weakness, body aches, chills, diarrhea for 2 days now resolved.  Patient denies any abdominal pain, nausea, vomiting, poor appetite.    ED course: VS- T 101.9*F, , /96, SpO2 94%, RR 20  s/p acetaminophen 650mg, azithromycin 500mg, ceftriaxone 1000mg, IVF 2L NaCl  EKG: sinus tachycardia, rate 112bpm, QTc 425ms    CT chest:  Scattered clusters of tree-in bud opacities in the right upper lobe, right middle lobe, and right lower lobe likely reflective of a bronchiolitis.  Mild bibasilar dependent atelectasis and trace bilateral pleural effusions.  Few mildly enlarged mediastinal lymph nodes, nonspecific.

## 2025-03-04 NOTE — H&P ADULT - ASSESSMENT
79 y/o female with PMHx of HLD, hypothyroidism, anxiety, depression, bipolar disorder, chronic pain, dementia, overactive bladder presents to ED from [place] for cough, congestion, fever for the last 2 days. Endorses total body weakness, body aches, chills, diarrhea for 2 days now resolved.  Patient denies any abdominal pain, nausea, vomiting, poor appetite.    #Severe sepsis secondary to influenza pneumonia  #Bronchiolitis  #hypoxia  - met criteria- wbc 16.68, T 101.9*F,   - influenza A +  - s/p acetaminophen 650mg, azithromycin 500mg, ceftriaxone 1000mg, IVF 2L NaCl  - CT chest: Scattered clusters of tree-in bud opacities in the right upper lobe, right middle lobe, and right lower lobe likely reflective of a bronchiolitis. Mild bibasilar dependent atelectasis and trace bilateral pleural effusions. Few mildly enlarged mediastinal lymph nodes, nonspecific.  - continue ceftriaxone and azithromycin  - start Tamiflu  - UA trace leuk  - f/u urine and blood cultures, sputum culture  - ordered procalcitonin, D dimer, urine legionella and strep  - continuous pulse ox, NC as needed  - f/u MRSA swab    #Hyponatremia  - Na 129  - s/p IVF 2L NaCl in the ED  - continue NS 75cc/hr    #anemia  - hgb 11, MCV 92  - monitor    #bipolar disorder  - continue divalproex, quetiapine  - home med paliperidone not available at hospital pt needs to bring med in    #anxiety #depression  - continue Xanax, trazodone    #hypothyroidism  - c/w levothyroxine 50mcg  - TSH in AM    #HLD  - c/w atorvastatin 40mg    #chronic pain  - c/w gabapentin    #dementia  - c/w donepezil    #overactive bladder  - home med gemtesa not available at hospital- ordered mirabegron    #dry eyes  - continue refresh, Systane lubricant    #constipation  - c/w MiraLAX    #DVT ppx  -Lovenox    #GOC  - full code    Case seen and discussed with Dr. Suarez

## 2025-03-04 NOTE — H&P ADULT - ATTENDING COMMENTS
#Severe sepsis 2/2 influenza PNA - present on admission  #Bronchiolitis  #hypoxia  - Ceftriaxone  - azithromycin  - tamiflu  - blood cultures  - procalcitonin  - continuous pulse ox, NC as needed    #Hyponatremia  - NS 75cc/hr    Lovenox   DASH diet

## 2025-03-04 NOTE — ED ADULT NURSE NOTE - NSFALLUNIVINTERV_ED_ALL_ED
Bed/Stretcher in lowest position, wheels locked, appropriate side rails in place/Call bell, personal items and telephone in reach/Instruct patient to call for assistance before getting out of bed/chair/stretcher/Non-slip footwear applied when patient is off stretcher/[place] to call system/Physically safe environment - no spills, clutter or unnecessary equipment/Purposeful proactive rounding/Room/bathroom lighting operational, light cord in reach

## 2025-03-04 NOTE — ED ADULT NURSE NOTE - CCCP TRG CHIEF CMPLNT
Metronidazole Pregnancy And Lactation Text: This medication is Pregnancy Category B and considered safe during pregnancy.  It is also excreted in breast milk. fever

## 2025-03-04 NOTE — H&P ADULT - NSICDXPASTMEDICALHX_GEN_ALL_CORE_FT
PAST MEDICAL HISTORY:  Anxiety and depression     Bipolar disorder     Dementia     Hyperlipidemia     Hypothyroidism     Overactive bladder

## 2025-03-04 NOTE — H&P ADULT - NSHPPHYSICALEXAM_GEN_ALL_CORE
Vitals  T(F): 99.5 (03-05-25 @ 02:44), Max: 101.9 (03-04-25 @ 20:46)  HR: 103 (03-05-25 @ 02:44) (103 - 123)  BP: 144/60 (03-05-25 @ 02:44) (122/71 - 144/60)  RR: 16 (03-05-25 @ 02:44) (16 - 20)  SpO2: 96% (03-05-25 @ 02:44) (94% - 96%)    PHYSICAL EXAM   Gen: NAD, comfortable  HEENT: head atraumatic and normocephalic, PEERLA, EOMI,  no gross abnormalities of ears, dry mucous membranes, no oral lesions, neck supple  CVS: +s1, s2, regular rate and rhythm, no murmurs  Pulmonary: normal respiratory effort, bilateral rhonchi  Abdomen: soft, non-tender, non-distended, +bowel sounds in all 4 quadrants  Extremities: no pedal edema, pedal pulses palpable  Neuro: AAOx2-3

## 2025-03-04 NOTE — ED PROVIDER NOTE - PHYSICAL EXAMINATION
Gen: alert, NAD  HEENT:  NC/AT, PERR  CV:  well perfused, tachy  Pulm:  mildly increased RR, lung with diffuse rhonchi  Abd: s/nt/nd  MSK: moving all extremities  Neuro:  non-focal  Skin:  visualized areas intact  Psych: AOx2

## 2025-03-04 NOTE — ED PROVIDER NOTE - OBJECTIVE STATEMENT
Patient reports cough, congestion, fever for the last 2 days.  Today comes to the ER because of total body weakness, body aches and inability to take care of herself.  Patient denies any abdominal pain, nausea or vomiting.

## 2025-03-04 NOTE — ED ADULT NURSE NOTE - CHIEF COMPLAINT QUOTE
BIB EMS from [place] for c/o fever, cough, tachycardia, HA, abd pain, neck pain that began today. pt is A&ox3 at baseline and today is more lethargic. oral temp 101.9 on arrival and tachy to 123 in triage. Per EMS, NH thinks pt might have flu.

## 2025-03-05 LAB
ALBUMIN SERPL ELPH-MCNC: 2.9 G/DL — LOW (ref 3.3–5)
ALP SERPL-CCNC: 81 U/L — SIGNIFICANT CHANGE UP (ref 40–120)
ALT FLD-CCNC: 22 U/L — SIGNIFICANT CHANGE UP (ref 10–45)
ANION GAP SERPL CALC-SCNC: 11 MMOL/L — SIGNIFICANT CHANGE UP (ref 5–17)
AST SERPL-CCNC: 19 U/L — SIGNIFICANT CHANGE UP (ref 10–40)
BILIRUB SERPL-MCNC: 0.3 MG/DL — SIGNIFICANT CHANGE UP (ref 0.2–1.2)
BUN SERPL-MCNC: 11 MG/DL — SIGNIFICANT CHANGE UP (ref 7–23)
CALCIUM SERPL-MCNC: 8.5 MG/DL — SIGNIFICANT CHANGE UP (ref 8.4–10.5)
CHLORIDE SERPL-SCNC: 105 MMOL/L — SIGNIFICANT CHANGE UP (ref 96–108)
CO2 SERPL-SCNC: 23 MMOL/L — SIGNIFICANT CHANGE UP (ref 22–31)
CREAT SERPL-MCNC: 0.71 MG/DL — SIGNIFICANT CHANGE UP (ref 0.5–1.3)
EGFR: 87 ML/MIN/1.73M2 — SIGNIFICANT CHANGE UP
EGFR: 87 ML/MIN/1.73M2 — SIGNIFICANT CHANGE UP
FLUAV H1 2009 PAND RNA SPEC QL NAA+PROBE: DETECTED
GLUCOSE SERPL-MCNC: 98 MG/DL — SIGNIFICANT CHANGE UP (ref 70–99)
HCT VFR BLD CALC: 30.8 % — LOW (ref 34.5–45)
HGB BLD-MCNC: 10 G/DL — LOW (ref 11.5–15.5)
MAGNESIUM SERPL-MCNC: 1.9 MG/DL — SIGNIFICANT CHANGE UP (ref 1.6–2.6)
MCHC RBC-ENTMCNC: 30.5 PG — SIGNIFICANT CHANGE UP (ref 27–34)
MCHC RBC-ENTMCNC: 32.5 G/DL — SIGNIFICANT CHANGE UP (ref 32–36)
MCV RBC AUTO: 93.9 FL — SIGNIFICANT CHANGE UP (ref 80–100)
MRSA PCR RESULT.: SIGNIFICANT CHANGE UP
NRBC BLD AUTO-RTO: 0 /100 WBCS — SIGNIFICANT CHANGE UP (ref 0–0)
PHOSPHATE SERPL-MCNC: 2.8 MG/DL — SIGNIFICANT CHANGE UP (ref 2.5–4.5)
PLATELET # BLD AUTO: 254 K/UL — SIGNIFICANT CHANGE UP (ref 150–400)
POTASSIUM SERPL-MCNC: 3.7 MMOL/L — SIGNIFICANT CHANGE UP (ref 3.5–5.3)
POTASSIUM SERPL-SCNC: 3.7 MMOL/L — SIGNIFICANT CHANGE UP (ref 3.5–5.3)
PROCALCITONIN SERPL-MCNC: 0.4 NG/ML — HIGH
PROT SERPL-MCNC: 6.1 G/DL — SIGNIFICANT CHANGE UP (ref 6–8.3)
RAPID RVP RESULT: DETECTED
RBC # BLD: 3.28 M/UL — LOW (ref 3.8–5.2)
RBC # FLD: 13.8 % — SIGNIFICANT CHANGE UP (ref 10.3–14.5)
S AUREUS DNA NOSE QL NAA+PROBE: SIGNIFICANT CHANGE UP
SARS-COV-2 RNA SPEC QL NAA+PROBE: SIGNIFICANT CHANGE UP
SODIUM SERPL-SCNC: 139 MMOL/L — SIGNIFICANT CHANGE UP (ref 135–145)
WBC # BLD: 12.69 K/UL — HIGH (ref 3.8–10.5)
WBC # FLD AUTO: 12.69 K/UL — HIGH (ref 3.8–10.5)

## 2025-03-05 RX ORDER — DOCUSATE SODIUM 100 MG
2 CAPSULE ORAL
Refills: 0 | DISCHARGE

## 2025-03-05 RX ORDER — HYPROMELLOSE 0.4 %
1 DROPS OPHTHALMIC (EYE)
Refills: 0 | DISCHARGE

## 2025-03-05 RX ORDER — ACETAMINOPHEN 500 MG/5ML
2 LIQUID (ML) ORAL
Refills: 0 | DISCHARGE

## 2025-03-05 RX ORDER — ENOXAPARIN SODIUM 100 MG/ML
40 INJECTION SUBCUTANEOUS EVERY 24 HOURS
Refills: 0 | Status: DISCONTINUED | OUTPATIENT
Start: 2025-03-05 | End: 2025-03-06

## 2025-03-05 RX ORDER — ASPIRIN 325 MG
1 TABLET ORAL
Refills: 0 | DISCHARGE

## 2025-03-05 RX ORDER — HYPROMELLOSE 0.4 %
1 DROPS OPHTHALMIC (EYE) ONCE
Refills: 0 | Status: COMPLETED | OUTPATIENT
Start: 2025-03-05 | End: 2025-03-05

## 2025-03-05 RX ORDER — CLONAZEPAM 0.5 MG/1
1 TABLET ORAL
Refills: 0 | DISCHARGE

## 2025-03-05 RX ORDER — PALIPERIDONE 9 MG/1
1 TABLET, EXTENDED RELEASE ORAL
Refills: 0 | DISCHARGE

## 2025-03-05 RX ORDER — QUETIAPINE FUMARATE 25 MG/1
1 TABLET ORAL
Refills: 0 | DISCHARGE

## 2025-03-05 RX ORDER — ATORVASTATIN CALCIUM 80 MG/1
20 TABLET, FILM COATED ORAL AT BEDTIME
Refills: 0 | Status: DISCONTINUED | OUTPATIENT
Start: 2025-03-05 | End: 2025-03-06

## 2025-03-05 RX ORDER — GABAPENTIN 400 MG/1
300 CAPSULE ORAL
Refills: 0 | Status: DISCONTINUED | OUTPATIENT
Start: 2025-03-05 | End: 2025-03-06

## 2025-03-05 RX ORDER — LEVOTHYROXINE SODIUM 300 MCG
1 TABLET ORAL
Refills: 0 | DISCHARGE

## 2025-03-05 RX ORDER — TRAZODONE HCL 100 MG
1 TABLET ORAL
Refills: 0 | DISCHARGE

## 2025-03-05 RX ORDER — DONEPEZIL HYDROCHLORIDE 5 MG/1
10 TABLET ORAL AT BEDTIME
Refills: 0 | Status: DISCONTINUED | OUTPATIENT
Start: 2025-03-05 | End: 2025-03-06

## 2025-03-05 RX ORDER — TRAZODONE HCL 100 MG
100 TABLET ORAL AT BEDTIME
Refills: 0 | Status: DISCONTINUED | OUTPATIENT
Start: 2025-03-05 | End: 2025-03-06

## 2025-03-05 RX ORDER — MELOXICAM 15 MG/1
1 TABLET ORAL
Refills: 0 | DISCHARGE

## 2025-03-05 RX ORDER — CLONAZEPAM 0.5 MG/1
0.5 TABLET ORAL AT BEDTIME
Refills: 0 | Status: DISCONTINUED | OUTPATIENT
Start: 2025-03-05 | End: 2025-03-06

## 2025-03-05 RX ORDER — MIRABEGRON 50 MG/1
25 TABLET, FILM COATED, EXTENDED RELEASE ORAL DAILY
Refills: 0 | Status: DISCONTINUED | OUTPATIENT
Start: 2025-03-05 | End: 2025-03-06

## 2025-03-05 RX ORDER — POLYETHYLENE GLYCOL 3350 17 G/17G
1 POWDER, FOR SOLUTION ORAL
Refills: 0 | DISCHARGE

## 2025-03-05 RX ORDER — ASPIRIN 325 MG
81 TABLET ORAL DAILY
Refills: 0 | Status: DISCONTINUED | OUTPATIENT
Start: 2025-03-05 | End: 2025-03-06

## 2025-03-05 RX ORDER — VIBEGRON 75 MG/1
1 TABLET, FILM COATED ORAL
Refills: 0 | DISCHARGE

## 2025-03-05 RX ORDER — POLYETHYLENE GLYCOL 3350 17 G/17G
17 POWDER, FOR SOLUTION ORAL DAILY
Refills: 0 | Status: DISCONTINUED | OUTPATIENT
Start: 2025-03-05 | End: 2025-03-06

## 2025-03-05 RX ORDER — HYPROMELLOSE 0.4 %
1 DROPS OPHTHALMIC (EYE) AT BEDTIME
Refills: 0 | Status: DISCONTINUED | OUTPATIENT
Start: 2025-03-05 | End: 2025-03-06

## 2025-03-05 RX ORDER — DONEPEZIL HYDROCHLORIDE 5 MG/1
1 TABLET ORAL
Refills: 0 | DISCHARGE

## 2025-03-05 RX ORDER — QUETIAPINE FUMARATE 25 MG/1
50 TABLET ORAL AT BEDTIME
Refills: 0 | Status: DISCONTINUED | OUTPATIENT
Start: 2025-03-05 | End: 2025-03-06

## 2025-03-05 RX ORDER — LEVOTHYROXINE SODIUM 300 MCG
50 TABLET ORAL DAILY
Refills: 0 | Status: DISCONTINUED | OUTPATIENT
Start: 2025-03-05 | End: 2025-03-06

## 2025-03-05 RX ORDER — ATORVASTATIN CALCIUM 80 MG/1
1 TABLET, FILM COATED ORAL
Refills: 0 | DISCHARGE

## 2025-03-05 RX ORDER — INFLUENZA A VIRUS A/IDAHO/07/2018 (H1N1) ANTIGEN (MDCK CELL DERIVED, PROPIOLACTONE INACTIVATED, INFLUENZA A VIRUS A/INDIANA/08/2018 (H3N2) ANTIGEN (MDCK CELL DERIVED, PROPIOLACTONE INACTIVATED), INFLUENZA B VIRUS B/SINGAPORE/INFTT-16-0610/2016 ANTIGEN (MDCK CELL DERIVED, PROPIOLACTONE INACTIVATED), INFLUENZA B VIRUS B/IOWA/06/2017 ANTIGEN (MDCK CELL DERIVED, PROPIOLACTONE INACTIVATED) 15; 15; 15; 15 UG/.5ML; UG/.5ML; UG/.5ML; UG/.5ML
0.5 INJECTION, SUSPENSION INTRAMUSCULAR ONCE
Refills: 0 | Status: DISCONTINUED | OUTPATIENT
Start: 2025-03-05 | End: 2025-03-06

## 2025-03-05 RX ORDER — GABAPENTIN 400 MG/1
1 CAPSULE ORAL
Refills: 0 | DISCHARGE

## 2025-03-05 RX ORDER — HYPROMELLOSE 0.4 %
1 DROPS OPHTHALMIC (EYE) THREE TIMES A DAY
Refills: 0 | Status: DISCONTINUED | OUTPATIENT
Start: 2025-03-05 | End: 2025-03-06

## 2025-03-05 RX ADMIN — Medication 81 MILLIGRAM(S): at 14:26

## 2025-03-05 RX ADMIN — Medication 100 MILLIGRAM(S): at 23:08

## 2025-03-05 RX ADMIN — OSELTAMIVIR PHOSPHATE 75 MILLIGRAM(S): 75 CAPSULE ORAL at 06:33

## 2025-03-05 RX ADMIN — GABAPENTIN 300 MILLIGRAM(S): 400 CAPSULE ORAL at 18:01

## 2025-03-05 RX ADMIN — Medication 1 DROP(S): at 23:08

## 2025-03-05 RX ADMIN — Medication 75 MILLILITER(S): at 00:03

## 2025-03-05 RX ADMIN — Medication 1 DROP(S): at 23:10

## 2025-03-05 RX ADMIN — OSELTAMIVIR PHOSPHATE 75 MILLIGRAM(S): 75 CAPSULE ORAL at 18:01

## 2025-03-05 RX ADMIN — ATORVASTATIN CALCIUM 20 MILLIGRAM(S): 80 TABLET, FILM COATED ORAL at 23:07

## 2025-03-05 RX ADMIN — Medication 1 DROP(S): at 14:26

## 2025-03-05 RX ADMIN — MIRABEGRON 25 MILLIGRAM(S): 50 TABLET, FILM COATED, EXTENDED RELEASE ORAL at 14:26

## 2025-03-05 RX ADMIN — Medication 1 DROP(S): at 06:33

## 2025-03-05 RX ADMIN — CEFTRIAXONE 100 MILLIGRAM(S): 500 INJECTION, POWDER, FOR SOLUTION INTRAMUSCULAR; INTRAVENOUS at 23:26

## 2025-03-05 RX ADMIN — OSELTAMIVIR PHOSPHATE 75 MILLIGRAM(S): 75 CAPSULE ORAL at 00:03

## 2025-03-05 RX ADMIN — ENOXAPARIN SODIUM 40 MILLIGRAM(S): 100 INJECTION SUBCUTANEOUS at 06:55

## 2025-03-05 RX ADMIN — QUETIAPINE FUMARATE 50 MILLIGRAM(S): 25 TABLET ORAL at 23:08

## 2025-03-05 RX ADMIN — Medication 500 MILLIGRAM(S): at 23:07

## 2025-03-05 RX ADMIN — Medication 650 MILLIGRAM(S): at 23:07

## 2025-03-05 RX ADMIN — GABAPENTIN 300 MILLIGRAM(S): 400 CAPSULE ORAL at 06:31

## 2025-03-05 RX ADMIN — Medication 50 MICROGRAM(S): at 06:33

## 2025-03-05 RX ADMIN — DONEPEZIL HYDROCHLORIDE 10 MILLIGRAM(S): 5 TABLET ORAL at 23:08

## 2025-03-05 NOTE — DIETITIAN INITIAL EVALUATION ADULT - PERTINENT LABORATORY DATA
03-05    139  |  105  |  11  ----------------------------<  98  3.7   |  23  |  0.71    Ca    8.5      05 Mar 2025 07:08  Phos  2.8     03-05  Mg     1.9     03-05    TPro  6.1  /  Alb  2.9[L]  /  TBili  0.3  /  DBili  x   /  AST  19  /  ALT  22  /  AlkPhos  81  03-05

## 2025-03-05 NOTE — PATIENT PROFILE ADULT - FALL HARM RISK - HARM RISK INTERVENTIONS

## 2025-03-05 NOTE — DIETITIAN INITIAL EVALUATION ADULT - ADD RECOMMEND
OFFICE VISIT      Patient: Leann Carey Date of Service: 2024    : 1933 MRN: [id number]     SUBJECTIVE:   HISTORY OF PRESENT ILLNESS:  Leann Carey is a 89 year old female who presents today for a six month general cardiology follow up.  She is a patient of Dr Fregoso.     She has a history of anteroapical LV aneurysm, ICM with HFrEF s/p ICD , carotid disease, CAD, HLD, HTN, NSVT, and pericardial effusion.     Appears comfortable in clinic, here with son  Main complaint is fatigue which is not new  No chest pain or SOB  No dizziness  Not checking BP at home   Feet are swollen today  Doesn't remember if she took meds this morning  Poor appetite, dentures broke and difficult to get replaced, she is down 2 lbs since last visit  No falls in the last six months      PAST MEDICAL HISTORY:  Past Medical History:   Diagnosis Date    Aneurysm (CMD)     Aortic valve insufficiency     Atherosclerosis of aorta (CMD)     Cataract     Dual ICD (implantable cardioverter-defibrillator) in place     Essential (primary) hypertension     High cholesterol     Ischemic cardiomyopathy     Left ventricular enlargement        MEDICATIONS:  Current Outpatient Medications   Medication Sig    pantoprazole (PROTONIX) 40 MG tablet TAKE ONE TABLET BY MOUTH EVERY MORNING    Multiple Vitamin (multivitamin) tablet Take 1 tablet by mouth daily.    rosuvastatin (CRESTOR) 40 MG tablet Take 1 tablet by mouth at bedtime.    carvedilol (COREG) 12.5 MG tablet TAKE ONE TABLET BY MOUTH EVERY DAY    methIMAzole (TAPAZOLE) 5 MG tablet TAKE ONE TABLET BY MOUTH EVERY MORNING    torsemide (DEMADEX) 10 MG tablet TAKE TWO TABLETS BY MOUTH EVERY DAY    potassium CHLORIDE (KLOR-CON M) 20 MEQ shea ER tablet TAKE ONE TABLET BY MOUTH EVERY MORNING    donepezil (ARICEPT) 5 MG tablet TAKE ONE TABLET BY MOUTH AT BEDTIME     No current facility-administered medications for this visit.       ALLERGIES:  ALLERGIES:   Allergen Reactions    Shellfish Allergy    (Food Or Med) RASH and ANAPHYLAXIS    No Name Available Other (See Comments)     No known drug allergies     Penicillins Other (See Comments)     unknown       PAST SURGICAL HISTORY:  Past Surgical History:   Procedure Laterality Date    Cardiac pacemaker placement      Colectomy      Hysterectomy      Total hip replacement         FAMILY HISTORY:  Family History   Problem Relation Age of Onset    Diabetes Mother     Patient is unaware of any medical problems Father         no known medical problems    Diabetes Sister     Myocardial Infarction Brother     Stroke/TIA Other        SOCIAL HISTORY:  Social History     Tobacco Use    Smoking status: Former     Types: Cigarettes    Smokeless tobacco: Never    Tobacco comments:     quit over 10 years   Vaping Use    Vaping status: never used   Substance Use Topics    Alcohol use: Never    Drug use: Never       Review of Systems   Constitutional: Positive for malaise/fatigue. Negative for chills, fever, weight gain and weight loss.   HENT:  Negative for hearing loss and nosebleeds.    Cardiovascular:  Negative for chest pain, claudication, dyspnea on exertion, leg swelling, near-syncope, orthopnea, palpitations, paroxysmal nocturnal dyspnea and syncope.   Respiratory:  Negative for shortness of breath, sleep disturbances due to breathing and snoring.    Hematologic/Lymphatic: Does not bruise/bleed easily.   Skin:  Negative for color change, dry skin, rash and suspicious lesions.   Musculoskeletal:  Negative for arthritis, falls and myalgias.   Gastrointestinal:  Negative for hematochezia and melena.   Genitourinary:  Negative for hematuria.   Neurological:  Negative for dizziness, focal weakness, light-headedness and paresthesias.   Allergic/Immunologic: Negative for environmental allergies.        No new food allergies        All other systems reviewed and are negative.    OBJECTIVE:     Physical Exam   Constitutional: She appears healthy. No distress.   Vital signs  reviewed   HENT:   Mouth/Throat: Oropharynx is clear.   Eyes: Conjunctivae are normal.   Neck: Thyroid normal.   Cardiovascular: Normal rate, regular rhythm, S1 normal, S2 normal, normal heart sounds, intact distal pulses and normal pulses.   No murmur heard.  Pulmonary/Chest: Effort normal and breath sounds normal. She exhibits no tenderness.   Abdominal: Soft. Bowel sounds are normal.   Musculoskeletal:         General: Edema (moderate BL feet) present. Normal range of motion.      Cervical back: Normal range of motion and neck supple.   Neurological: She is alert and oriented to person, place, and time. She has normal motor skills. Gait normal.   Skin: Skin is warm and dry. No rash noted. No cyanosis. Nails show no clubbing.           Visit Vitals  BP 93/49 (BP Location: LUE - Left upper extremity, Patient Position: Sitting, Cuff Size: Regular)   Pulse 73   Wt 47 kg (103 lb 9.9 oz)   BMI 17.24 kg/m²     Body mass index is 17.24 kg/m².  Wt Readings from Last 4 Encounters:   24 47 kg (103 lb 9.9 oz)   23 52.2 kg (115 lb)   10/19/23 47.6 kg (105 lb)   10/11/23 47.7 kg (105 lb 2.6 oz)       Vital Signs and previous readings were reviewed during today's visit    DIAGNOSTIC STUDIES:   LAB RESULTS:  Recent Labs   Lab 10/11/23  0947   Cholesterol 179   HDL 60   Triglycerides 107   CALCLDL 98   Non-HDL Cholesterol 119       Lab Results   Component Value Date    LIPOA 49 (H) 10/11/2023       Recent Labs   Lab 10/11/23  1004 10/11/23  0947   Sodium  --  141   Chloride  --  109   BUN  --  27*   Potassium  --  4.2   Glucose  --  99   Creatinine  --  2.34*   Calcium  --  9.2   TSH 2.746  --        Recent Labs   Lab 10/11/23  1004   WBC 3.7*   RBC 3.25*   HGB 10.7*   HCT 34.2*   .2*   MCHC 31.3*   RDW-CV 12.6   *   Lymphocytes, Percent 35       Recent Labs   Lab 10/11/23  0947   GPT 16        Recent Labs   Lab 10/11/23  1004   NT-proBNP 1,331*       Cardiac Testin: Inez MPI: ef 29%, LAD  infarct  2017: echo: Ef 35%, apical aneurysm with partial layered thrombus  2017: Echo: no LV thrombus.  2019: echo:  27%, apical aneurysm, severe LAE, no pericardial effusion.  2020: CUS: minimal carotid disease  2021: ECG: SR, ASMI, IWMI (unchanged from prior)  2021: Echo: 35%, apica aneurysm, DD1, pacer  2022: Echo: 40%   2023: TTE: 42% mild LVH hypokinesis of apical anteroseptal, inferior, inferoseptal walls G1 DD RVSP 33     Lab Results Reviewed and Diagnostic Data Reviewed    The ASCVD Risk score (Loli LANE, et al., 2019) failed to calculate for the following reasons:    The 2019 ASCVD risk score is only valid for ages 40 to 79    Hemoglobin A1C (%)   Date Value   07/30/2019 4.8   .         Patient's chart was reviewed for previous cardiology lab work and testing. Pertinent findings were reviewed with the patient during today's visit.    ASSESSMENT AND PLAN:   This is a 89 year old year-old female who presents with:    1. Anemia (D64.9)   · Status post EGD and colonoscopy at [facility] April 2017 negative. stable Hb.   Aspirin was stopped too as she was not eating much.   2. Aneurysm of left ventricle of heart (I25.3)   · anteroapical   · this was the reason for need for anticoagulation. INR goal 2-3. we had a long discussion with patient and son about anticoagulation. It is common to stop Anticoagulation after 6 month of Anterior infarct. however, she had been maintained on it long term by prior cardiologist and she was very afraid of having a CVA and she has had persistent layered apical thrombus. We discussed risks of noncompliance too. In the end, as she had recurrence of noncompliance and high INR, would stop coumadin. so far, no complications.  We discussed low dose xarelto, but I would be reluctant to use given the prior complications from coumadin.    3. Cardiomyopathy, ischemic (I25.5)   · - sp AWMI. sp ICD. no shocks.      - EF 42%       - followup device clinic.   4. Carotid artery disease, unspecified  laterality (I77.9)   · mild disease. followup carotid US in 2020 showed minimal disease.    5. Chronic combined systolic and diastolic CHF (congestive heart failure) (I50.42)   · On Torsemide 20 mg daily. low salt diet discussed. no Spirono due to CKD and prior hypotension. continue with K supplements. BP soft, moderate BLE swelling but weight stable since last visit.    Repeat labs today.    6. Coronary artery disease (I25.10)   · stable. asymptomatic with mild activities. continue with aspirin, statin, BB. hold ACE-I due to PENELOPE, hypotension. 2014 stress test shows old infarct without ischemia. cath 2017 at [facility] showed stable disease.   7. Dual ICD (implantable cardioverter-defibrillator) in place (Z95.810)   · 9/30/2005 - Medtronic dual chamber; Abdoul 3/11/2013 replaced with new Abdoul RV lead  Most recent device check 1/2024 2 episodes, no therapy   8. Hypercholesterolemia (E78.00)  10/2023   HDL 60 LDL 98   · Continue rosuvastatin 40 mg. LDL now above goal, will plan to repeat lipids again prior to adjusting meds.     9. Hypertensive heart disease with congestive heart failure and chronic kidney disease   (I13.0)   · well controlled. CPM   10. Non-sustained ventricular tachycardia (I47.2)   · - maintain K> 4, Mg> 2      - asymptomatic and not requiring any ICD therapies to date and burden unchanged ~1      every 3 months   11. Pericardial effusion (I31.3)   · Hemorrhagic effusion of unclear etiology so far      Negative for malignancy, negative rheumatologic workup except positive  ribonucleoprotein, negative for infection   · Unclear etiology      Off prednisone; Rheumatology follow-up       screen for TB      followup echo is stable. Repeat prior to next visit with Dr Fregoso.     Orders Placed This Encounter    Basic Metabolic Panel    NT proBNP       Recommendations  1. Labs today  2. Follow up with me in three months, Dr Fregoso in six months      RUPERT Bunn  Adult Nurse  Practitioner  Cardiology     Instructions provided as documented in the AVS.    The patient and son(s) indicated understanding of the diagnosis and agreed with the plan of care.      Greater than 50% of the visit was spent counseling regarding above issues; total time spent 20 minutes.       1) Monitor Weights, Intake, Tolerance, Skin & Labwork  2) Recommend Change Diet to Regular Diet   3) Recommend Initiate Ensure Plus High Protein 8oz PO Daily  4) Continue Nutrition Plan of Care

## 2025-03-05 NOTE — DIETITIAN INITIAL EVALUATION ADULT - NS FNS DIET ORDER
DASH-TLC Diet w/ Thin Liquids (IDDSI Level 0)  Recommend Change Diet to Regular Diet (IDDSI Level 7) w/ Thin Liquids (IDDSI Level 0)   Recommend Initiate Ensure Plus High Protein 8oz PO Daily (Provides 350kcal & 20grams of Protein)

## 2025-03-05 NOTE — PROGRESS NOTE ADULT - SUBJECTIVE AND OBJECTIVE BOX
Patient is a 80y old  Female who presents with a chief complaint of sepsis secondary to Influenza A (04 Mar 2025 23:56)    SUBJECTIVE / OVERNIGHT EVENTS: Patient seen and examined. Still with cough but phlegm not enough to provide sample. Feels tired and fatigue still. Fell at home which was out of ordinary for her.     MEDICATIONS  (STANDING):  artificial tears (preservative free) Ophthalmic Solution 1 Drop(s) Both EYES three times a day  aspirin  chewable 81 milliGRAM(s) Oral daily  atorvastatin 20 milliGRAM(s) Oral at bedtime  azithromycin  IVPB 500 milliGRAM(s) IV Intermittent every 24 hours  cefTRIAXone   IVPB 1000 milliGRAM(s) IV Intermittent every 24 hours  divalproex  milliGRAM(s) Oral at bedtime  donepezil 10 milliGRAM(s) Oral at bedtime  enoxaparin Injectable 40 milliGRAM(s) SubCutaneous every 24 hours  gabapentin 300 milliGRAM(s) Oral two times a day  levothyroxine 50 MICROGram(s) Oral daily  mirabegron ER 25 milliGRAM(s) Oral daily  oseltamivir 75 milliGRAM(s) Oral two times a day  petrolatum Ophthalmic Ointment 1 Application(s) Both EYES at bedtime  polyethylene glycol 3350 17 Gram(s) Oral daily  QUEtiapine 50 milliGRAM(s) Oral at bedtime  sodium chloride 0.9%. 1000 milliLiter(s) (75 mL/Hr) IV Continuous <Continuous>  traZODone 100 milliGRAM(s) Oral at bedtime    MEDICATIONS  (PRN):  acetaminophen     Tablet .. 650 milliGRAM(s) Oral every 6 hours PRN Temp greater or equal to 38C (100.4F), Mild Pain (1 - 3)  aluminum hydroxide/magnesium hydroxide/simethicone Suspension 30 milliLiter(s) Oral every 4 hours PRN Dyspepsia  clonazePAM  Tablet 0.5 milliGRAM(s) Oral at bedtime PRN insomnia  melatonin 3 milliGRAM(s) Oral at bedtime PRN Insomnia  ondansetron Injectable 4 milliGRAM(s) IV Push every 8 hours PRN Nausea and/or Vomiting    Vital Signs Last 24 Hrs  T(C): 37.5 (05 Mar 2025 02:44), Max: 38.8 (04 Mar 2025 20:46)  T(F): 99.5 (05 Mar 2025 02:44), Max: 101.9 (04 Mar 2025 20:46)  HR: 99 (05 Mar 2025 08:00) (95 - 123)  BP: 100/58 (05 Mar 2025 08:00) (100/58 - 144/60)  BP(mean): 67 (05 Mar 2025 08:00) (67 - 86)  RR: 16 (05 Mar 2025 08:00) (16 - 20)  SpO2: 95% (05 Mar 2025 08:00) (94% - 96%)    Parameters below as of 05 Mar 2025 08:00  Patient On (Oxygen Delivery Method): room air    PHYSICAL EXAM:  GENERAL: NAD, well-developed  HEAD:  Atraumatic, Normocephalic  EYES: EOMI, PERRLA, conjunctiva and sclera clear  NECK: Supple, No JVD  CHEST/LUNG: Clear to auscultation bilaterally; No wheeze  HEART: Regular rate and rhythm; No murmurs, rubs, or gallops  ABDOMEN: Soft, Nontender, Nondistended; Bowel sounds present  EXTREMITIES:  2+ Peripheral Pulses, No clubbing, cyanosis, or edema  PSYCH: AAOx3  NEUROLOGY: non-focal  SKIN: No rashes or lesions    LABS:                        10.0   12.69 )-----------( 254      ( 05 Mar 2025 07:08 )             30.8     03-05    139  |  105  |  11  ----------------------------<  98  3.7   |  23  |  0.71    Ca    8.5      05 Mar 2025 07:08  Phos  2.8     03-05  Mg     1.9     03-05    TPro  6.1  /  Alb  2.9[L]  /  TBili  0.3  /  DBili  x   /  AST  19  /  ALT  22  /  AlkPhos  81  03-05      Urinalysis Basic - ( 05 Mar 2025 07:08 )    Color: x / Appearance: x / SG: x / pH: x  Gluc: 98 mg/dL / Ketone: x  / Bili: x / Urobili: x   Blood: x / Protein: x / Nitrite: x   Leuk Esterase: x / RBC: x / WBC x   Sq Epi: x / Non Sq Epi: x / Bacteria: x        RADIOLOGY & ADDITIONAL TESTS:    Imaging Personally Reviewed:    Consultant(s) Notes Reviewed:      Care Discussed with Consultants/Other Providers:    Assessment and Plan:

## 2025-03-05 NOTE — PATIENT PROFILE ADULT - FUNCTIONAL ASSESSMENT - BASIC MOBILITY 6.
3-calculated by average/Not able to assess (calculate score using [facility] averaging method)  3-calculated by average/Not able to assess (calculate score using [facility] averaging method)

## 2025-03-05 NOTE — DIETITIAN INITIAL EVALUATION ADULT - OTHER INFO
Initial Nutrition Assessment   80yr Old Female   No Food Allergy/Intolerance - Per EMR  No Pressure Ulcers (as Per Nursing Flow Sheets)  No Edema Noted (as Per Nursing Flow Sheets)

## 2025-03-05 NOTE — DIETITIAN NUTRITION RISK NOTIFICATION - TREATMENT: THE FOLLOWING DIET HAS BEEN RECOMMENDED
Recommend Change Diet to Regular Diet   Recommend Initiate Ensure Plus High Protein 8oz PO Daily (Provides 350kcal & 20grams of Protein)

## 2025-03-05 NOTE — DIETITIAN INITIAL EVALUATION ADULT - PERTINENT MEDS FT
MEDICATIONS  (STANDING):  artificial tears (preservative free) Ophthalmic Solution 1 Drop(s) Both EYES three times a day  aspirin  chewable 81 milliGRAM(s) Oral daily  atorvastatin 20 milliGRAM(s) Oral at bedtime  azithromycin  IVPB 500 milliGRAM(s) IV Intermittent every 24 hours  cefTRIAXone   IVPB 1000 milliGRAM(s) IV Intermittent every 24 hours  divalproex  milliGRAM(s) Oral at bedtime  donepezil 10 milliGRAM(s) Oral at bedtime  enoxaparin Injectable 40 milliGRAM(s) SubCutaneous every 24 hours  gabapentin 300 milliGRAM(s) Oral two times a day  levothyroxine 50 MICROGram(s) Oral daily  mirabegron ER 25 milliGRAM(s) Oral daily  oseltamivir 75 milliGRAM(s) Oral two times a day  petrolatum Ophthalmic Ointment 1 Application(s) Both EYES at bedtime  polyethylene glycol 3350 17 Gram(s) Oral daily  QUEtiapine 50 milliGRAM(s) Oral at bedtime  traZODone 100 milliGRAM(s) Oral at bedtime    MEDICATIONS  (PRN):  acetaminophen     Tablet .. 650 milliGRAM(s) Oral every 6 hours PRN Temp greater or equal to 38C (100.4F), Mild Pain (1 - 3)  aluminum hydroxide/magnesium hydroxide/simethicone Suspension 30 milliLiter(s) Oral every 4 hours PRN Dyspepsia  clonazePAM  Tablet 0.5 milliGRAM(s) Oral at bedtime PRN insomnia  melatonin 3 milliGRAM(s) Oral at bedtime PRN Insomnia  ondansetron Injectable 4 milliGRAM(s) IV Push every 8 hours PRN Nausea and/or Vomiting

## 2025-03-05 NOTE — PROGRESS NOTE ADULT - ASSESSMENT
81 y/o female with PMHx of HLD, hypothyroidism, anxiety, depression, bipolar disorder, chronic pain, dementia, overactive bladder presents to ED from [place] for cough, congestion, fever admitted for flu    #Sepsis secondary to influenza - fever and leukocytosis (does not meet criteria for severe sepsis. Lactate 2)  #Bronchiolitis  - influenza A +  -started on tamiflu  - CT chest with tree and bud opacities, procal minimally elevated  - continue ceftriaxone and azithromycin for concern for superimposed bacterial infection   - f/u urine and blood cultures, sputum culture    #Hyponatremia suspect 2/2 hypovolemia   - resolved now, Na 139    #Anemia  - hgb 11, MCV 92  - monitor    #bipolar disorder  - continue divalproex, quetiapine  - home med paliperidone not available at hospital pt needs to bring med in    #anxiety #depression  - continue Xanax, trazodone    #hypothyroidism  - c/w levothyroxine 50mcg    #HLD  - c/w atorvastatin 40mg    #chronic pain  - c/w gabapentin    #dementia  - c/w donepezil    #overactive bladder  - home med gemtesa not available at hospital- ordered mirabegron    #dry eyes  - continue refresh, Systane lubricant    #constipation  - c/w MiraLAX    #DVT ppx  -Lovenox    #GOC  - full code   81 y/o female with PMHx of HLD, hypothyroidism, anxiety, depression, bipolar disorder, chronic pain, dementia, overactive bladder presents to ED from [place] for cough, congestion, fever admitted for flu    #Sepsis secondary to influenza - fever and leukocytosis (does not meet criteria for severe sepsis. Lactate 2)  #Bronchiolitis  - influenza A +  -started on tamiflu  - CT chest with tree and bud opacities, procal minimally elevated  - continue ceftriaxone and azithromycin for concern for superimposed bacterial infection   - f/u urine and blood cultures, sputum culture    #Hyponatremia suspect 2/2 hypovolemia   - resolved now, Na 139    #Anemia  - hgb 11, MCV 92  - monitor    #bipolar disorder  - continue divalproex, quetiapine  - home med paliperidone not available at hospital pt needs to bring med in    #anxiety #depression  - continue Xanax, trazodone    #hypothyroidism  - c/w levothyroxine 50mcg    #HLD  - c/w atorvastatin 40mg    #chronic pain  - c/w gabapentin    #dementia  - c/w donepezil    #overactive bladder  - home med gemtesa not available at hospital- ordered mirabegron    #dry eyes  - continue refresh, Systane lubricant    #constipation  - c/w MiraLAX    #DVT ppx  -Lovenox    #GOC  - full code    Spoke to sister madiha and updates provided

## 2025-03-06 ENCOUNTER — TRANSCRIPTION ENCOUNTER (OUTPATIENT)
Age: 80
End: 2025-03-06

## 2025-03-06 VITALS
DIASTOLIC BLOOD PRESSURE: 70 MMHG | RESPIRATION RATE: 16 BRPM | HEART RATE: 80 BPM | SYSTOLIC BLOOD PRESSURE: 112 MMHG | TEMPERATURE: 98 F | OXYGEN SATURATION: 95 %

## 2025-03-06 LAB
ANION GAP SERPL CALC-SCNC: 13 MMOL/L — SIGNIFICANT CHANGE UP (ref 5–17)
BUN SERPL-MCNC: 11 MG/DL — SIGNIFICANT CHANGE UP (ref 7–23)
CALCIUM SERPL-MCNC: 8.9 MG/DL — SIGNIFICANT CHANGE UP (ref 8.4–10.5)
CHLORIDE SERPL-SCNC: 107 MMOL/L — SIGNIFICANT CHANGE UP (ref 96–108)
CO2 SERPL-SCNC: 22 MMOL/L — SIGNIFICANT CHANGE UP (ref 22–31)
CREAT SERPL-MCNC: 0.75 MG/DL — SIGNIFICANT CHANGE UP (ref 0.5–1.3)
CULTURE RESULTS: SIGNIFICANT CHANGE UP
EGFR: 82 ML/MIN/1.73M2 — SIGNIFICANT CHANGE UP
EGFR: 82 ML/MIN/1.73M2 — SIGNIFICANT CHANGE UP
GLUCOSE SERPL-MCNC: 97 MG/DL — SIGNIFICANT CHANGE UP (ref 70–99)
HCT VFR BLD CALC: 30.3 % — LOW (ref 34.5–45)
HGB BLD-MCNC: 9.7 G/DL — LOW (ref 11.5–15.5)
MCHC RBC-ENTMCNC: 30.6 PG — SIGNIFICANT CHANGE UP (ref 27–34)
MCHC RBC-ENTMCNC: 32 G/DL — SIGNIFICANT CHANGE UP (ref 32–36)
MCV RBC AUTO: 95.6 FL — SIGNIFICANT CHANGE UP (ref 80–100)
NRBC BLD AUTO-RTO: 0 /100 WBCS — SIGNIFICANT CHANGE UP (ref 0–0)
PLATELET # BLD AUTO: 241 K/UL — SIGNIFICANT CHANGE UP (ref 150–400)
POTASSIUM SERPL-MCNC: 3.6 MMOL/L — SIGNIFICANT CHANGE UP (ref 3.5–5.3)
POTASSIUM SERPL-SCNC: 3.6 MMOL/L — SIGNIFICANT CHANGE UP (ref 3.5–5.3)
RBC # BLD: 3.17 M/UL — LOW (ref 3.8–5.2)
RBC # FLD: 13.6 % — SIGNIFICANT CHANGE UP (ref 10.3–14.5)
SODIUM SERPL-SCNC: 142 MMOL/L — SIGNIFICANT CHANGE UP (ref 135–145)
SPECIMEN SOURCE: SIGNIFICANT CHANGE UP
TSH SERPL-MCNC: 1.67 UIU/ML — SIGNIFICANT CHANGE UP (ref 0.36–3.74)
WBC # BLD: 8.07 K/UL — SIGNIFICANT CHANGE UP (ref 3.8–10.5)
WBC # FLD AUTO: 8.07 K/UL — SIGNIFICANT CHANGE UP (ref 3.8–10.5)

## 2025-03-06 RX ORDER — OSELTAMIVIR PHOSPHATE 75 MG/1
1 CAPSULE ORAL
Qty: 6 | Refills: 0
Start: 2025-03-06

## 2025-03-06 RX ORDER — AZITHROMYCIN 250 MG
1 CAPSULE ORAL
Qty: 2 | Refills: 0
Start: 2025-03-06 | End: 2025-03-07

## 2025-03-06 RX ORDER — OSELTAMIVIR PHOSPHATE 75 MG/1
1 CAPSULE ORAL
Qty: 4 | Refills: 0
Start: 2025-03-06 | End: 2025-03-07

## 2025-03-06 RX ORDER — CEFUROXIME SODIUM 1.5 G
500 VIAL (EA) INJECTION EVERY 12 HOURS
Refills: 0 | Status: DISCONTINUED | OUTPATIENT
Start: 2025-03-06 | End: 2025-03-06

## 2025-03-06 RX ORDER — AZITHROMYCIN 250 MG
500 CAPSULE ORAL DAILY
Refills: 0 | Status: DISCONTINUED | OUTPATIENT
Start: 2025-03-07 | End: 2025-03-06

## 2025-03-06 RX ORDER — CEFUROXIME SODIUM 1.5 G
1 VIAL (EA) INJECTION
Qty: 4 | Refills: 0
Start: 2025-03-06

## 2025-03-06 RX ORDER — DEXTROMETHORPHAN HBR, GUAIFENESIN 200 MG/10ML
600 LIQUID ORAL EVERY 12 HOURS
Refills: 0 | Status: DISCONTINUED | OUTPATIENT
Start: 2025-03-06 | End: 2025-03-06

## 2025-03-06 RX ORDER — CEFUROXIME SODIUM 1.5 G
1 VIAL (EA) INJECTION
Qty: 4 | Refills: 0
Start: 2025-03-06 | End: 2025-03-07

## 2025-03-06 RX ADMIN — OSELTAMIVIR PHOSPHATE 75 MILLIGRAM(S): 75 CAPSULE ORAL at 17:51

## 2025-03-06 RX ADMIN — Medication 650 MILLIGRAM(S): at 00:07

## 2025-03-06 RX ADMIN — Medication 500 MILLIGRAM(S): at 17:51

## 2025-03-06 RX ADMIN — GABAPENTIN 300 MILLIGRAM(S): 400 CAPSULE ORAL at 17:51

## 2025-03-06 RX ADMIN — Medication 1 DROP(S): at 14:00

## 2025-03-06 RX ADMIN — OSELTAMIVIR PHOSPHATE 75 MILLIGRAM(S): 75 CAPSULE ORAL at 05:02

## 2025-03-06 RX ADMIN — GABAPENTIN 300 MILLIGRAM(S): 400 CAPSULE ORAL at 05:10

## 2025-03-06 RX ADMIN — Medication 250 MILLIGRAM(S): at 00:31

## 2025-03-06 RX ADMIN — Medication 50 MICROGRAM(S): at 05:02

## 2025-03-06 RX ADMIN — Medication 1 DROP(S): at 05:02

## 2025-03-06 RX ADMIN — DEXTROMETHORPHAN HBR, GUAIFENESIN 600 MILLIGRAM(S): 200 LIQUID ORAL at 13:59

## 2025-03-06 RX ADMIN — ENOXAPARIN SODIUM 40 MILLIGRAM(S): 100 INJECTION SUBCUTANEOUS at 05:09

## 2025-03-06 RX ADMIN — POLYETHYLENE GLYCOL 3350 17 GRAM(S): 17 POWDER, FOR SOLUTION ORAL at 13:59

## 2025-03-06 RX ADMIN — MIRABEGRON 25 MILLIGRAM(S): 50 TABLET, FILM COATED, EXTENDED RELEASE ORAL at 13:59

## 2025-03-06 RX ADMIN — CLONAZEPAM 0.5 MILLIGRAM(S): 0.5 TABLET ORAL at 13:58

## 2025-03-06 NOTE — DISCHARGE NOTE PROVIDER - HOSPITAL COURSE
Hospital Course, admitted 3/4     79 y/o female with PMHx of HLD, hypothyroidism, anxiety, depression, bipolar disorder, chronic pain, dementia, overactive bladder presents to ED from [place] for cough, congestion, fever for the last 2 days. Patient was found to have sepsis secondary to flu. Patient had CT chest was noted to have bronchiolitis. Patient was treated with ceftriaxone and azithromycin. Plan to d/c on PO Ceftin and Azithro for 2 more days. Urine and blood cultures were negative. Patient to be discharged on Tamiflu for 2 more days. Plan to be d/c to [facility].     Azithromycin PO - 2 more days, last day 3/8   Ceftin PO - 2 more days, last day 3/8   Tamiflu - 2 more days, last day 3/8     You will need to follow up with your primary care physician.    Discharging Provider:  Tra Whitlock NP  Contact Info: Cell [phone] - Please call with any questions or concerns.    Outpatient Provider: [place], Dr. Alaina - aware     Hospital Course, admitted 3/4     81 y/o female with PMHx of HLD, hypothyroidism, anxiety, depression, bipolar disorder, chronic pain, dementia, overactive bladder presents to ED from [place] for cough, congestion, fever for the last 2 days. Patient was found to have sepsis secondary to flu. Patient had CT chest was noted to have bronchiolitis. Patient was treated with ceftriaxone and azithromycin. Plan to d/c on PO Ceftin and Azithro for 2 more days. Urine and blood cultures were negative. Patient to be discharged on Tamiflu for 2 more days. Plan to be d/c to [facility].     Azithromycin PO - completed 3 days of 500 mg daily   Ceftin PO - unitl 3/8  Tamiflu - last day 3/9 am    You will need to follow up with your primary care physician.    Discharging Provider:  Tra Whitlock NP  Contact Info: Cell [phone] - Please call with any questions or concerns.    Outpatient Provider: [place], Dr. Alaina - aware

## 2025-03-06 NOTE — PROGRESS NOTE ADULT - ASSESSMENT
79 y/o female with PMHx of HLD, hypothyroidism, anxiety, depression, bipolar disorder, chronic pain, dementia, overactive bladder presents to ED from [place] for cough, congestion, fever admitted for flu.     #Sepsis secondary to influenza - fever and leukocytosis (does not meet criteria for severe sepsis. Lactate 2)  #Bronchiolitis  #Leukocytosis   - influenza A +  - started on tamiflu  - CT chest with tree and bud opacities, procal minimally elevated  - continue ceftriaxone and azithromycin for concern for superimposed bacterial infection   - Ur cultures and blood cultures negative  - follow f/wbc     #Hyponatremia suspect 2/2 hypovolemia   - resolved, follow bmp     #Anemia  - hgb 11, MCV 92  - monitor    #bipolar disorder  - continue divalproex, quetiapine  - home med paliperidone not available at hospital pt needs to bring med in    #anxiety   #depression  - continue Xanax, trazodone    #hypothyroidism  - c/w levothyroxine 50mcg    #HLD  - c/w atorvastatin 40mg    #chronic pain  - c/w gabapentin    #dementia  - c/w donepezil    #overactive bladder  - home med gemtesa not available at hospital- ordered mirabegron    #dry eyes  - continue refresh, Systane lubricant    #constipation  - c/w MiraLAX    #DVT ppx  - Lovenox    #GOC  - full code    Dispo: pending IV abx, flu resolution, from [place]    Spoke to sister madiha and updates provided    79 y/o female with PMHx of HLD, hypothyroidism, anxiety, depression, bipolar disorder, chronic pain, dementia, overactive bladder presents to ED from [place] for cough, congestion, fever admitted for flu.     #Sepsis secondary to influenza - fever and leukocytosis (does not meet criteria for severe sepsis. Lactate 2)  #Bronchiolitis  #Leukocytosis   - influenza A +  - continue tamiflu   - CT chest with tree and bud opacities, procal minimally elevated  - transitioned abx to PO zithromax/Ceftin   - Ur cultures and blood cultures negative  - follow f/wbc     #Hyponatremia suspect 2/2 hypovolemia   - resolved, follow bmp     #Anemia  - hgb 11, MCV 92  - monitor, stable     #bipolar disorder  - continue divalproex, quetiapine  - home med paliperidone not available at hospital pt needs to bring med in    #anxiety   #depression  - continue Xanax, trazodone    #hypothyroidism  - c/w levothyroxine 50mcg    #HLD  - c/w atorvastatin 40mg    #chronic pain  - c/w gabapentin    #dementia  - c/w donepezil    #overactive bladder  - home med gemtesa not available at hospital- ordered mirabegron    #dry eyes  - continue refresh, Systane lubricant    #constipation  - c/w MiraLAX    #DVT ppx  - Lovenox    #GOC  - full code    Dispo: from [place], discharge planning     Patient updated on plan of care    79 y/o female with PMHx of HLD, hypothyroidism, anxiety, depression, bipolar disorder, chronic pain, dementia, overactive bladder presents to ED from [place] for cough, congestion, fever admitted for flu.     #Sepsis secondary to influenza - fever and leukocytosis (does not meet criteria for severe sepsis. Lactate 2)  #Bronchiolitis  #Leukocytosis   - influenza A +  - continue tamiflu   - CT chest with tree and bud opacities, procal minimally elevated  - transitioned abx to PO zithromax/Ceftin   - added mucinex for cough   - Ur cultures and blood cultures negative  - follow f/wbc     #Hyponatremia suspect 2/2 hypovolemia   - resolved, follow bmp     #Anemia  - hgb 11, MCV 92  - monitor, stable     #bipolar disorder  - continue divalproex, quetiapine  - home med paliperidone not available at hospital pt needs to bring med in    #anxiety   #depression  - continue Xanax, trazodone    #hypothyroidism  - c/w levothyroxine 50mcg    #HLD  - c/w atorvastatin 40mg    #chronic pain  - c/w gabapentin    #dementia  - c/w donepezil    #overactive bladder  - home med gemtesa not available at hospital- ordered mirabegron    #dry eyes  - continue refresh, Systane lubricant    #constipation  - c/w MiraLAX    #DVT ppx  - Lovenox    #GOC  - full code    Dispo: from [place], discharge planning     Patient updated on plan of care

## 2025-03-06 NOTE — PROGRESS NOTE ADULT - NUTRITIONAL ASSESSMENT
This patient has been assessed with a concern for Malnutrition and has been determined to have a diagnosis/diagnoses of Moderate protein-calorie malnutrition.    This patient is being managed with:   Diet Regular-  Supplement Feeding Modality:  Oral  Ensure Plus High Protein Cans or Servings Per Day:  1       Frequency:  Daily  Entered: Mar  5 2025 11:05AM

## 2025-03-06 NOTE — DISCHARGE NOTE PROVIDER - ATTENDING DISCHARGE PHYSICAL EXAMINATION:
GENERAL: Not in distress. Alert    HEENT: AT/NC. clear conjuctiva, MMM.   no pallor or icterus  CARDIOVASCULAR: RRR S1, S2. soft SM. no rubs/gallop  LUNGS: BLAE+, no rales, no wheezing, no rhonchi.    ABDOMEN: ND. Soft,  NT, no guarding / rebound / rigidity. BS normoactive. No CVA tenderness.    BACK: No spine tenderness.  EXTREMITIES: no edema. no leg or calf TP.  SKIN: no rash. or skin break or ulcer on exposed skin. No cellulitis.  NEUROLOGIC: AAO*3. pleasantly confused. delayed recall. strength is symmetric, sensation intact, speech fluent.    PSYCHIATRIC: Calm.  No agitation.

## 2025-03-06 NOTE — DISCHARGE NOTE NURSING/CASE MANAGEMENT/SOCIAL WORK - FINANCIAL ASSISTANCE
[facility] provides services at a reduced cost to those who are determined to be eligible through [facility]’s financial assistance program. Information regarding [facility]’s financial assistance program can be found by going to [email or web address] or by calling [phone].

## 2025-03-06 NOTE — DISCHARGE NOTE NURSING/CASE MANAGEMENT/SOCIAL WORK - NSDCPEFALRISK_GEN_ALL_CORE
For information on Fall & Injury Prevention, visit: [email or web address] OR  [email or web address] OR  https://www.cdc.gov/steadi/patient.html

## 2025-03-06 NOTE — PHYSICAL THERAPY INITIAL EVALUATION ADULT - PERTINENT HX OF CURRENT PROBLEM, REHAB EVAL
79 y/o female with PMHx of HLD, hypothyroidism, anxiety, depression, bipolar disorder, chronic pain, dementia, overactive bladder presents to ED from [place] for cough, congestion, fever for the last 2 days. Endorses total body weakness, body aches, chills, diarrhea for 2 days now resolved.  Patient denies any abdominal pain, nausea, vomiting, poor appetite.

## 2025-03-06 NOTE — DISCHARGE NOTE PROVIDER - NSDCCPCAREPLAN_GEN_ALL_CORE_FT
PRINCIPAL DISCHARGE DIAGNOSIS  Diagnosis: Pneumonia due to influenza A virus  Assessment and Plan of Treatment: You were admitted with flu. you were treated with Abx and tamiflu. Please continue to take all medications as prescribed.     PRINCIPAL DISCHARGE DIAGNOSIS  Diagnosis: Pneumonia due to influenza A virus  Assessment and Plan of Treatment: You were admitted with flu and pneumonia. you were treated with antibiotics and tamiflu. Please continue to take all medications as prescribed. lasr day of ant

## 2025-03-06 NOTE — DISCHARGE NOTE NURSING/CASE MANAGEMENT/SOCIAL WORK - NSDPDISTO_GEN_ALL_CORE
Sub-Acute rehab
Airway patent, Nasal mucosa clear. Mouth with normal mucosa. Throat has no vesicles, no oropharyngeal exudates and uvula is midline.

## 2025-03-06 NOTE — DISCHARGE NOTE NURSING/CASE MANAGEMENT/SOCIAL WORK - PATIENT PORTAL LINK FT
You can access the FollowMyHealth Patient Portal offered by [facility] by registering at the following website: [email or web address]. By joining [facility]’s FollowMyHealth portal, you will also be able to view your health information using other applications (apps) compatible with our system.

## 2025-03-06 NOTE — PROGRESS NOTE ADULT - SUBJECTIVE AND OBJECTIVE BOX
c< from: CT Chest No Cont (03.04.25 @ 21:56) >    IMPRESSION:  Scattered clusters of tree-in bud opacities in the right upper lobe,   right middle lobe, and right lower lobe likely reflective of a   bronchiolitis.    Mild bibasilar dependent atelectasis and trace bilateral pleural   effusions.    Few mildly enlarged mediastinal lymph nodes, nonspecific.    < end of copied text >   Patient is a 80y old  Female who presents with a chief complaint of sepsis secondary to Influenza A (06 Mar 2025 12:02)      Patient seen and examined at bedside. No overnight events reported.     ALLERGIES:  No Known Allergies    MEDICATIONS  (STANDING):  artificial tears (preservative free) Ophthalmic Solution 1 Drop(s) Both EYES three times a day  aspirin  chewable 81 milliGRAM(s) Oral daily  atorvastatin 20 milliGRAM(s) Oral at bedtime  azithromycin   Tablet 500 milliGRAM(s) Oral daily  cefuroxime   Tablet 500 milliGRAM(s) Oral every 12 hours  divalproex  milliGRAM(s) Oral at bedtime  donepezil 10 milliGRAM(s) Oral at bedtime  enoxaparin Injectable 40 milliGRAM(s) SubCutaneous every 24 hours  gabapentin 300 milliGRAM(s) Oral two times a day  influenza  Vaccine (HIGH DOSE) 0.5 milliLiter(s) IntraMuscular once  levothyroxine 50 MICROGram(s) Oral daily  mirabegron ER 25 milliGRAM(s) Oral daily  oseltamivir 75 milliGRAM(s) Oral two times a day  petrolatum Ophthalmic Ointment 1 Application(s) Both EYES at bedtime  polyethylene glycol 3350 17 Gram(s) Oral daily  QUEtiapine 50 milliGRAM(s) Oral at bedtime  traZODone 100 milliGRAM(s) Oral at bedtime    MEDICATIONS  (PRN):  acetaminophen     Tablet .. 650 milliGRAM(s) Oral every 6 hours PRN Temp greater or equal to 38C (100.4F), Mild Pain (1 - 3)  aluminum hydroxide/magnesium hydroxide/simethicone Suspension 30 milliLiter(s) Oral every 4 hours PRN Dyspepsia  clonazePAM  Tablet 0.5 milliGRAM(s) Oral at bedtime PRN insomnia  guaiFENesin  milliGRAM(s) Oral every 12 hours PRN Cough  melatonin 3 milliGRAM(s) Oral at bedtime PRN Insomnia  ondansetron Injectable 4 milliGRAM(s) IV Push every 8 hours PRN Nausea and/or Vomiting    Vital Signs Last 24 Hrs  T(F): 97.8 (06 Mar 2025 12:59), Max: 99.3 (05 Mar 2025 21:52)  HR: 80 (06 Mar 2025 12:59) (72 - 91)  BP: 112/70 (06 Mar 2025 12:59) (112/70 - 132/70)  RR: 16 (06 Mar 2025 12:59) (16 - 18)  SpO2: 95% (06 Mar 2025 12:59) (93% - 96%)  I&O's Summary    05 Mar 2025 07:01  -  06 Mar 2025 07:00  --------------------------------------------------------  IN: 0 mL / OUT: 400 mL / NET: -400 mL      PHYSICAL EXAM:  General: NAD, A/O x 3  ENT: No gross hearing impairment, Moist mucous membranes, no thrush  Neck: Supple, No JVD  Lungs: Clear to auscultation bilaterally, good air entry, non-labored breathing  Cardio: RRR, S1/S2, No murmur  Abdomen: Soft, Nontender, Nondistended; Bowel sounds present  Extremities: No calf tenderness, No cyanosis, No pitting edema  Psych: Appropriate mood and affect    LABS:                        9.7    8.07  )-----------( 241      ( 06 Mar 2025 06:50 )             30.3     03-06    142  |  107  |  11  ----------------------------<  97  3.6   |  22  |  0.75    Ca    8.9      06 Mar 2025 06:50  Phos  2.8     03-05  Mg     1.9     03-05    TPro  6.1  /  Alb  2.9  /  TBili  0.3  /  DBili  x   /  AST  19  /  ALT  22  /  AlkPhos  81  03-05            Lactate, Blood: 2.0 mmol/L (03-04 @ 21:35)    Procalcitonin: 0.40 ng/mL (03-05-25 @ 07:08)          TSH 1.669   TSH with FT4 reflex --  Total T3 --    21:35 - VBG - pH: 7.38  | pCO2: 38    | pO2: <35   | Lactate:                      Urinalysis Basic - ( 06 Mar 2025 06:50 )    Color: x / Appearance: x / SG: x / pH: x  Gluc: 97 mg/dL / Ketone: x  / Bili: x / Urobili: x   Blood: x / Protein: x / Nitrite: x   Leuk Esterase: x / RBC: x / WBC x   Sq Epi: x / Non Sq Epi: x / Bacteria: x        Culture - Urine (collected 04 Mar 2025 22:35)  Source: Clean Catch Clean Catch (Midstream)  Final Report (06 Mar 2025 04:01):    <10,000 CFU/mL Normal Urogenital Ellie    Culture - Blood (collected 04 Mar 2025 21:35)  Source: Blood Blood  Preliminary Report (06 Mar 2025 05:01):    No growth at 24 hours    Culture - Blood (collected 04 Mar 2025 21:35)  Source: Blood Blood  Preliminary Report (06 Mar 2025 05:01):    No growth at 24 hours        RADIOLOGY & ADDITIONAL TESTS:    Care Discussed with Consultants/Other Providers:         c< from: CT Chest No Cont (03.04.25 @ 21:56) >    IMPRESSION:  Scattered clusters of tree-in bud opacities in the right upper lobe,   right middle lobe, and right lower lobe likely reflective of a   bronchiolitis.    Mild bibasilar dependent atelectasis and trace bilateral pleural   effusions.    Few mildly enlarged mediastinal lymph nodes, nonspecific.    < end of copied text >

## 2025-03-06 NOTE — PROGRESS NOTE ADULT - NS ATTEND AMEND GEN_ALL_CORE FT
Seen and examined. Chart reviewed.   patient is improved clinically.   Agree with above a/p  Edited where ever is necessary    no complaints other than cough. ROS neg. exam stable. ambulatory O2 sat >93% per RN. on CTx/zithromax. temiflu. stable for DC with PO abx.   PT rec home PT/care home

## 2025-03-06 NOTE — PHYSICAL THERAPY INITIAL EVALUATION ADULT - ADDITIONAL COMMENTS
pt from [facility], states she is independent w/ambulaton , staff assists w/some ADL's

## 2025-03-06 NOTE — DISCHARGE NOTE PROVIDER - NSDCMRMEDTOKEN_GEN_ALL_CORE_FT
acetaminophen 500 mg oral tablet: 2 tab(s) orally 3 times a day as needed for pain  aspirin 81 mg oral tablet: 1 tab(s) orally once a day  atorvastatin 20 mg oral tablet: 1 tab(s) orally once a day (at bedtime)  azithromycin 500 mg oral tablet: 1 tab(s) orally once a day  cefuroxime 500 mg oral tablet: 1 tab(s) orally every 12 hours  clonazePAM 0.5 mg oral tablet: 1 tab(s) orally once a day (at bedtime)  divalproex sodium 500 mg oral delayed release tablet: 1 tab(s) orally once a day (at bedtime)  docusate sodium 100 mg oral tablet: 2 tab(s) orally once a day (at bedtime)  donepezil 10 mg oral tablet: 1 tab(s) orally once a day (at bedtime)  gabapentin 300 mg oral capsule: 1 cap(s) orally 2 times a day  Gemtesa 75 mg oral tablet: 1 tab(s) orally once a day  levothyroxine 50 mcg (0.05 mg) oral tablet: 1 tab(s) orally once a day  meloxicam 15 mg oral tablet: 1 tab(s) orally once a day  ocular lubricant: 1 application intraocular 3 times a day  oseltamivir 75 mg oral capsule: 1 cap(s) orally 2 times a day  paliperidone 6 mg oral tablet, extended release: 1 tab(s) orally once a day  Polyethylene Glycol 3350: 1 application orally once a day  QUEtiapine 50 mg oral tablet: 1 tab(s) orally once a day (at bedtime)  Systane Nighttime ophthalmic ointment: 1 application in each eye once a day (at bedtime)  traZODone 100 mg oral tablet: 1 tab(s) orally once a day (at bedtime)   acetaminophen 500 mg oral tablet: 2 tab(s) orally 3 times a day as needed for pain  aspirin 81 mg oral tablet: 1 tab(s) orally once a day  atorvastatin 20 mg oral tablet: 1 tab(s) orally once a day (at bedtime)  cefuroxime 500 mg oral tablet: 1 tab(s) orally every 12 hours  clonazePAM 0.5 mg oral tablet: 1 tab(s) orally once a day (at bedtime)  divalproex sodium 500 mg oral delayed release tablet: 1 tab(s) orally once a day (at bedtime)  docusate sodium 100 mg oral tablet: 2 tab(s) orally once a day (at bedtime)  donepezil 10 mg oral tablet: 1 tab(s) orally once a day (at bedtime)  gabapentin 300 mg oral capsule: 1 cap(s) orally 2 times a day  Gemtesa 75 mg oral tablet: 1 tab(s) orally once a day  levothyroxine 50 mcg (0.05 mg) oral tablet: 1 tab(s) orally once a day  meloxicam 15 mg oral tablet: 1 tab(s) orally once a day  ocular lubricant: 1 application intraocular 3 times a day  oseltamivir 75 mg oral capsule: 1 cap(s) orally 2 times a day  paliperidone 6 mg oral tablet, extended release: 1 tab(s) orally once a day  Polyethylene Glycol 3350: 1 application orally once a day  QUEtiapine 50 mg oral tablet: 1 tab(s) orally once a day (at bedtime)  Systane Nighttime ophthalmic ointment: 1 application in each eye once a day (at bedtime)  traZODone 100 mg oral tablet: 1 tab(s) orally once a day (at bedtime)

## 2025-03-06 NOTE — DISCHARGE NOTE PROVIDER - CARE PROVIDER_API CALL
Omid Foley  Internal Medicine  [address] [facility] [facility] [address]  [place] [address]  Phone: [phone]  Fax: [phone]  Follow Up Time:

## 2025-04-10 ENCOUNTER — RX ONLY (RX ONLY)
Age: 80
End: 2025-04-10

## 2025-04-10 ENCOUNTER — OFFICE (OUTPATIENT)
Dept: URBAN - METROPOLITAN AREA CLINIC 63 | Facility: CLINIC | Age: 80
Setting detail: OPHTHALMOLOGY
End: 2025-04-10
Payer: COMMERCIAL

## 2025-04-10 DIAGNOSIS — H16.223: ICD-10-CM

## 2025-04-10 PROCEDURE — 68761 CLOSE TEAR DUCT OPENING: CPT | Mod: 50 | Performed by: INTERNAL MEDICINE

## 2025-04-10 ASSESSMENT — KERATOMETRY
OS_AXISANGLE_DEGREES: 168
OS_K2POWER_DIOPTERS: 43.75
OS_K1POWER_DIOPTERS: 43.00
OD_AXISANGLE_DEGREES: 168
OD_K1POWER_DIOPTERS: 42.50
OD_K2POWER_DIOPTERS: 43.50

## 2025-04-10 ASSESSMENT — REFRACTION_AUTOREFRACTION
OD_CYLINDER: -2.75
OS_AXIS: 101
OS_SPHERE: +3.25
OD_SPHERE: +3.75
OD_AXIS: 080
OS_CYLINDER: -1.25

## 2025-04-10 ASSESSMENT — REFRACTION_MANIFEST
OS_AXIS: 101
OS_SPHERE: +2.75
OD_SPHERE: +3.75
OU_VA: 20/40-1
OS_VA1: 20/50
OD_VA2: 20/30(J2)
OD_CYLINDER: -2.75
OS_CYLINDER: -1.25
OD_VA1: 20/40-1
OD_AXIS: 080
OS_VA2: 20/30(J2)
OS_ADD: +2.75
OD_ADD: +2.75

## 2025-04-10 ASSESSMENT — LID POSITION - DERMATOCHALASIS
OD_DERMATOCHALASIS: RUL
OS_DERMATOCHALASIS: LUL

## 2025-04-10 ASSESSMENT — SUPERFICIAL PUNCTATE KERATITIS (SPK)
OD_SPK: 2+
OS_SPK: 2+

## 2025-04-10 ASSESSMENT — TEAR BREAK UP TIME (TBUT)
OD_TBUT: 2+
OS_TBUT: 2+

## 2025-04-10 ASSESSMENT — TONOMETRY
OS_IOP_MMHG: 16
OD_IOP_MMHG: 16

## 2025-04-10 ASSESSMENT — CONFRONTATIONAL VISUAL FIELD TEST (CVF)
OD_FINDINGS: FULL
OS_FINDINGS: FULL

## 2025-04-10 ASSESSMENT — VISUAL ACUITY
OS_BCVA: 20/60-1
OD_BCVA: 20/50-1

## 2025-04-24 ENCOUNTER — OUTPATIENT (OUTPATIENT)
Dept: OUTPATIENT SERVICES | Facility: HOSPITAL | Age: 80
LOS: 1 days | End: 2025-04-24
Payer: MEDICARE

## 2025-04-24 VITALS
WEIGHT: 138.01 LBS | SYSTOLIC BLOOD PRESSURE: 105 MMHG | DIASTOLIC BLOOD PRESSURE: 66 MMHG | OXYGEN SATURATION: 98 % | RESPIRATION RATE: 15 BRPM | TEMPERATURE: 98 F | HEIGHT: 62 IN | HEART RATE: 84 BPM

## 2025-04-24 DIAGNOSIS — M17.11 UNILATERAL PRIMARY OSTEOARTHRITIS, RIGHT KNEE: ICD-10-CM

## 2025-04-24 DIAGNOSIS — Z01.818 ENCOUNTER FOR OTHER PREPROCEDURAL EXAMINATION: ICD-10-CM

## 2025-04-24 LAB
A1C WITH ESTIMATED AVERAGE GLUCOSE RESULT: 5.8 % — HIGH (ref 4–5.6)
ALBUMIN SERPL ELPH-MCNC: 3.6 G/DL — SIGNIFICANT CHANGE UP (ref 3.3–5)
ALP SERPL-CCNC: 96 U/L — SIGNIFICANT CHANGE UP (ref 30–120)
ALT FLD-CCNC: 27 U/L — SIGNIFICANT CHANGE UP (ref 10–60)
ANION GAP SERPL CALC-SCNC: 5 MMOL/L — SIGNIFICANT CHANGE UP (ref 5–17)
APTT BLD: 31.6 SEC — SIGNIFICANT CHANGE UP (ref 26.1–36.8)
AST SERPL-CCNC: 19 U/L — SIGNIFICANT CHANGE UP (ref 10–40)
BILIRUB SERPL-MCNC: 0.3 MG/DL — SIGNIFICANT CHANGE UP (ref 0.2–1.2)
BLD GP AB SCN SERPL QL: SIGNIFICANT CHANGE UP
BUN SERPL-MCNC: 17 MG/DL — SIGNIFICANT CHANGE UP (ref 7–23)
CALCIUM SERPL-MCNC: 8.9 MG/DL — SIGNIFICANT CHANGE UP (ref 8.4–10.5)
CHLORIDE SERPL-SCNC: 105 MMOL/L — SIGNIFICANT CHANGE UP (ref 96–108)
CO2 SERPL-SCNC: 30 MMOL/L — SIGNIFICANT CHANGE UP (ref 22–31)
CREAT SERPL-MCNC: 0.8 MG/DL — SIGNIFICANT CHANGE UP (ref 0.5–1.3)
EGFR: 75 ML/MIN/1.73M2 — SIGNIFICANT CHANGE UP
EGFR: 75 ML/MIN/1.73M2 — SIGNIFICANT CHANGE UP
ESTIMATED AVERAGE GLUCOSE: 120 MG/DL — HIGH (ref 68–114)
GLUCOSE SERPL-MCNC: 84 MG/DL — SIGNIFICANT CHANGE UP (ref 70–99)
HCT VFR BLD CALC: 35.4 % — SIGNIFICANT CHANGE UP (ref 34.5–45)
HGB BLD-MCNC: 11.4 G/DL — LOW (ref 11.5–15.5)
INR BLD: 0.93 RATIO — SIGNIFICANT CHANGE UP (ref 0.85–1.16)
MCHC RBC-ENTMCNC: 30.2 PG — SIGNIFICANT CHANGE UP (ref 27–34)
MCHC RBC-ENTMCNC: 32.2 G/DL — SIGNIFICANT CHANGE UP (ref 32–36)
MCV RBC AUTO: 93.9 FL — SIGNIFICANT CHANGE UP (ref 80–100)
MRSA PCR RESULT.: SIGNIFICANT CHANGE UP
NRBC BLD AUTO-RTO: 0 /100 WBCS — SIGNIFICANT CHANGE UP (ref 0–0)
PLATELET # BLD AUTO: 286 K/UL — SIGNIFICANT CHANGE UP (ref 150–400)
POTASSIUM SERPL-MCNC: 4.7 MMOL/L — SIGNIFICANT CHANGE UP (ref 3.5–5.3)
POTASSIUM SERPL-SCNC: 4.7 MMOL/L — SIGNIFICANT CHANGE UP (ref 3.5–5.3)
PROT SERPL-MCNC: 7 G/DL — SIGNIFICANT CHANGE UP (ref 6–8.3)
PROTHROM AB SERPL-ACNC: 10.8 SEC — SIGNIFICANT CHANGE UP (ref 9.9–13.4)
RBC # BLD: 3.77 M/UL — LOW (ref 3.8–5.2)
RBC # FLD: 13 % — SIGNIFICANT CHANGE UP (ref 10.3–14.5)
S AUREUS DNA NOSE QL NAA+PROBE: SIGNIFICANT CHANGE UP
SODIUM SERPL-SCNC: 140 MMOL/L — SIGNIFICANT CHANGE UP (ref 135–145)
WBC # BLD: 6.31 K/UL — SIGNIFICANT CHANGE UP (ref 3.8–10.5)
WBC # FLD AUTO: 6.31 K/UL — SIGNIFICANT CHANGE UP (ref 3.8–10.5)

## 2025-04-24 PROCEDURE — 36415 COLL VENOUS BLD VENIPUNCTURE: CPT

## 2025-04-24 PROCEDURE — 85730 THROMBOPLASTIN TIME PARTIAL: CPT

## 2025-04-24 PROCEDURE — 83036 HEMOGLOBIN GLYCOSYLATED A1C: CPT

## 2025-04-24 PROCEDURE — 86901 BLOOD TYPING SEROLOGIC RH(D): CPT

## 2025-04-24 PROCEDURE — 80053 COMPREHEN METABOLIC PANEL: CPT

## 2025-04-24 PROCEDURE — 86850 RBC ANTIBODY SCREEN: CPT

## 2025-04-24 PROCEDURE — 87641 MR-STAPH DNA AMP PROBE: CPT

## 2025-04-24 PROCEDURE — 85027 COMPLETE CBC AUTOMATED: CPT

## 2025-04-24 PROCEDURE — 87640 STAPH A DNA AMP PROBE: CPT

## 2025-04-24 PROCEDURE — 93005 ELECTROCARDIOGRAM TRACING: CPT

## 2025-04-24 PROCEDURE — 86900 BLOOD TYPING SEROLOGIC ABO: CPT

## 2025-04-24 PROCEDURE — G0463: CPT

## 2025-04-24 PROCEDURE — 93010 ELECTROCARDIOGRAM REPORT: CPT

## 2025-04-24 PROCEDURE — 85610 PROTHROMBIN TIME: CPT

## 2025-04-24 NOTE — H&P PST ADULT - NSICDXPASTMEDICALHX_GEN_ALL_CORE_FT
PAST MEDICAL HISTORY:  Chronic constipation     Dementia     Depression     Dyslipidemia     Hypothyroidism     OAB (overactive bladder)     Osteoarthritis of right knee     Schizo affective schizophrenia     Schizophrenia     Seasonal allergies     Urinary incontinence

## 2025-04-24 NOTE — H&P PST ADULT - HISTORY OF PRESENT ILLNESS
81 yo female reports longstanding right knee pain.  She is scheduled for right total knee replacement on 5/13/2025

## 2025-04-24 NOTE — H&P PST ADULT - NEGATIVE ENMT SYMPTOMS
no hearing difficulty/no tinnitus/no vertigo/no sinus symptoms/no nasal congestion/no nose bleeds/no gum bleeding/no throat pain/no dysphagia

## 2025-04-24 NOTE — H&P PST ADULT - ASSESSMENT
PRE-OP DATA and Check List - GI:  Procedure: Colonoscopy (24341) with any prep  Diagnosis: Special screening for malignant neoplasms, colon  Tentative Date: 05/13/2025  Tentative Time: To be determined  Duration of Procedure: 30 min  Anesthesia: MAC  Pre-Op Needed: no  Mytonomy Education Assignment: Colonoscopy    Do not take glipizide or metformin the day of procedure.    If you are a diabetic:   If you take diabetic pills, do not take them on your PROCEDURE day.   If you take evening insulin, take 1/2 of your usual dose the night before the preparation day.   If you are on sliding scale insulin, monitor your blood sugar and use insulin as usual on the preparation day.  Alternate between regular and diet/sugar-free liquids on the preparation day.  No evening insulin the evening on the preparation day.  No insulin at all on the procedure day prior to the procedure.  Check your blood sugar levels frequently throughout preparation day and procedure day.   Resume diabetic medications after returning home and eating.      Do not take any NSAIDS such as ibuprofen, advil, motrin meloxicam, naproxen, aspirin and aleve for 3 days prior to procedure.   You may continue to take tylenol during this time.    Do not take any vitamins/herbal supplements for 7 days prior to your procedure. This includes multivitamins, vitamin D, and iron supplements.      81 yo female is scheduled for right total knee replacement on 5/13/2025

## 2025-04-24 NOTE — H&P PST ADULT - NSICDXFAMILYHX_GEN_ALL_CORE_FT
FAMILY HISTORY:  Father  Still living? Unknown  FH: type 2 diabetes, Age at diagnosis: Age Unknown    Mother  Still living? No  FH: type 2 diabetes, Age at diagnosis: Age Unknown    Sibling  Still living? Yes, Estimated age: Age Unknown  Family history of breast cancer, Age at diagnosis: Age Unknown    Child  Still living? Yes, Estimated age: Age Unknown  Family history of uterine cancer, Age at diagnosis: Age Unknown

## 2025-04-24 NOTE — H&P PST ADULT - PROBLEM SELECTOR PLAN 1
Diagnostic testing performed  medical clearance requested  The pre op instructions were reviewed with patient's sister Agnes lee

## 2025-05-01 ENCOUNTER — NON-APPOINTMENT (OUTPATIENT)
Age: 80
End: 2025-05-01

## 2025-05-05 ENCOUNTER — APPOINTMENT (OUTPATIENT)
Dept: ORTHOPEDIC SURGERY | Facility: CLINIC | Age: 80
End: 2025-05-05

## 2025-05-13 ENCOUNTER — INPATIENT (INPATIENT)
Facility: HOSPITAL | Age: 80
LOS: 5 days | Discharge: NURSING, REHAB OR LONG-TERM CARE | DRG: 470 | End: 2025-05-19
Attending: ORTHOPAEDIC SURGERY | Admitting: ORTHOPAEDIC SURGERY
Payer: MEDICARE

## 2025-05-13 ENCOUNTER — TRANSCRIPTION ENCOUNTER (OUTPATIENT)
Age: 80
End: 2025-05-13

## 2025-05-13 ENCOUNTER — APPOINTMENT (OUTPATIENT)
Dept: ORTHOPEDIC SURGERY | Facility: HOSPITAL | Age: 80
End: 2025-05-13

## 2025-05-13 VITALS
WEIGHT: 139.55 LBS | SYSTOLIC BLOOD PRESSURE: 114 MMHG | HEIGHT: 62.5 IN | OXYGEN SATURATION: 98 % | TEMPERATURE: 97 F | HEART RATE: 84 BPM | DIASTOLIC BLOOD PRESSURE: 62 MMHG | RESPIRATION RATE: 14 BRPM

## 2025-05-13 DIAGNOSIS — M17.11 UNILATERAL PRIMARY OSTEOARTHRITIS, RIGHT KNEE: ICD-10-CM

## 2025-05-13 DIAGNOSIS — Z01.818 ENCOUNTER FOR OTHER PREPROCEDURAL EXAMINATION: ICD-10-CM

## 2025-05-13 LAB — ABO RH CONFIRMATION: SIGNIFICANT CHANGE UP

## 2025-05-13 PROCEDURE — 73560 X-RAY EXAM OF KNEE 1 OR 2: CPT | Mod: 26,RT

## 2025-05-13 PROCEDURE — 99222 1ST HOSP IP/OBS MODERATE 55: CPT

## 2025-05-13 PROCEDURE — S2900 ROBOTIC SURGICAL SYSTEM: CPT | Mod: NC

## 2025-05-13 PROCEDURE — 27447 TOTAL KNEE ARTHROPLASTY: CPT | Mod: RT

## 2025-05-13 PROCEDURE — 20985 CPTR-ASST DIR MS PX: CPT

## 2025-05-13 DEVICE — IMPLANTABLE DEVICE: Type: IMPLANTABLE DEVICE | Site: RIGHT | Status: FUNCTIONAL

## 2025-05-13 DEVICE — BONE WAX 2.5GM: Type: IMPLANTABLE DEVICE | Site: RIGHT | Status: FUNCTIONAL

## 2025-05-13 DEVICE — FEM PERSONA PS CMT CCR NRW S27 R: Type: IMPLANTABLE DEVICE | Site: RIGHT | Status: FUNCTIONAL

## 2025-05-13 DEVICE — ZIMMER/NEXGEN SMOOTH PIN 3.2X75MM: Type: IMPLANTABLE DEVICE | Site: RIGHT | Status: FUNCTIONAL

## 2025-05-13 DEVICE — SURF ART PERSONA RT 6-9 CD 14MM: Type: IMPLANTABLE DEVICE | Site: RIGHT | Status: FUNCTIONAL

## 2025-05-13 DEVICE — TIB PSN NP STM 5 DEG SZ DR: Type: IMPLANTABLE DEVICE | Site: RIGHT | Status: FUNCTIONAL

## 2025-05-13 DEVICE — PIN CAS FIX 3.2X150MM: Type: IMPLANTABLE DEVICE | Site: RIGHT | Status: FUNCTIONAL

## 2025-05-13 DEVICE — ZIMMER FEMALE HEX SCREW MAGNETIC 2.5MM X 25MM: Type: IMPLANTABLE DEVICE | Site: RIGHT | Status: FUNCTIONAL

## 2025-05-13 DEVICE — SCREW HEX HEADED 3.5X48MM: Type: IMPLANTABLE DEVICE | Site: RIGHT | Status: FUNCTIONAL

## 2025-05-13 DEVICE — PATELLA VE 29MM: Type: IMPLANTABLE DEVICE | Site: RIGHT | Status: FUNCTIONAL

## 2025-05-13 DEVICE — PIN FIX CAS 3.2X80MM STR: Type: IMPLANTABLE DEVICE | Site: RIGHT | Status: FUNCTIONAL

## 2025-05-13 RX ORDER — TRANEXAMIC ACID 1000 MG/10
1000 AMPUL (ML) INTRAVENOUS ONCE
Refills: 0 | Status: COMPLETED | OUTPATIENT
Start: 2025-05-13 | End: 2025-05-13

## 2025-05-13 RX ORDER — CEFAZOLIN SODIUM IN 0.9 % NACL 3 G/100 ML
2000 INTRAVENOUS SOLUTION, PIGGYBACK (ML) INTRAVENOUS EVERY 8 HOURS
Refills: 0 | Status: COMPLETED | OUTPATIENT
Start: 2025-05-13 | End: 2025-05-14

## 2025-05-13 RX ORDER — MELOXICAM 15 MG/1
1 TABLET ORAL
Refills: 0 | DISCHARGE

## 2025-05-13 RX ORDER — PALIPERIDONE 9 MG/1
6 TABLET, EXTENDED RELEASE ORAL DAILY
Refills: 0 | Status: DISCONTINUED | OUTPATIENT
Start: 2025-05-13 | End: 2025-05-19

## 2025-05-13 RX ORDER — TROSPIUM CHLORIDE 20 MG/1
1 TABLET, FILM COATED ORAL
Refills: 0 | DISCHARGE

## 2025-05-13 RX ORDER — CEFAZOLIN SODIUM IN 0.9 % NACL 3 G/100 ML
2000 INTRAVENOUS SOLUTION, PIGGYBACK (ML) INTRAVENOUS ONCE
Refills: 0 | Status: COMPLETED | OUTPATIENT
Start: 2025-05-13 | End: 2025-05-13

## 2025-05-13 RX ORDER — CELECOXIB 50 MG/1
200 CAPSULE ORAL EVERY 12 HOURS
Refills: 0 | Status: DISCONTINUED | OUTPATIENT
Start: 2025-05-13 | End: 2025-05-19

## 2025-05-13 RX ORDER — TRAZODONE HCL 100 MG
100 TABLET ORAL AT BEDTIME
Refills: 0 | Status: DISCONTINUED | OUTPATIENT
Start: 2025-05-13 | End: 2025-05-19

## 2025-05-13 RX ORDER — GABAPENTIN 400 MG/1
300 CAPSULE ORAL
Refills: 0 | Status: DISCONTINUED | OUTPATIENT
Start: 2025-05-13 | End: 2025-05-19

## 2025-05-13 RX ORDER — VIBEGRON 75 MG/1
1 TABLET, FILM COATED ORAL
Refills: 0 | DISCHARGE

## 2025-05-13 RX ORDER — APREPITANT 40 MG/1
40 CAPSULE ORAL ONCE
Refills: 0 | Status: COMPLETED | OUTPATIENT
Start: 2025-05-13 | End: 2025-05-13

## 2025-05-13 RX ORDER — QUETIAPINE FUMARATE 25 MG/1
50 TABLET ORAL AT BEDTIME
Refills: 0 | Status: DISCONTINUED | OUTPATIENT
Start: 2025-05-13 | End: 2025-05-19

## 2025-05-13 RX ORDER — ACETAMINOPHEN 500 MG/5ML
1000 LIQUID (ML) ORAL ONCE
Refills: 0 | Status: COMPLETED | OUTPATIENT
Start: 2025-05-13 | End: 2025-05-13

## 2025-05-13 RX ORDER — HYDROMORPHONE/SOD CHLOR,ISO/PF 2 MG/10 ML
0.5 SYRINGE (ML) INJECTION
Refills: 0 | Status: DISCONTINUED | OUTPATIENT
Start: 2025-05-13 | End: 2025-05-19

## 2025-05-13 RX ORDER — OXYCODONE HYDROCHLORIDE 30 MG/1
10 TABLET ORAL
Refills: 0 | Status: DISCONTINUED | OUTPATIENT
Start: 2025-05-13 | End: 2025-05-19

## 2025-05-13 RX ORDER — SODIUM CHLORIDE 9 G/1000ML
1000 INJECTION, SOLUTION INTRAVENOUS
Refills: 0 | Status: DISCONTINUED | OUTPATIENT
Start: 2025-05-13 | End: 2025-05-19

## 2025-05-13 RX ORDER — OXYCODONE HYDROCHLORIDE 30 MG/1
5 TABLET ORAL
Refills: 0 | Status: DISCONTINUED | OUTPATIENT
Start: 2025-05-13 | End: 2025-05-19

## 2025-05-13 RX ORDER — ATORVASTATIN CALCIUM 80 MG/1
20 TABLET, FILM COATED ORAL AT BEDTIME
Refills: 0 | Status: DISCONTINUED | OUTPATIENT
Start: 2025-05-13 | End: 2025-05-19

## 2025-05-13 RX ORDER — ACETAMINOPHEN 500 MG/5ML
1000 LIQUID (ML) ORAL EVERY 8 HOURS
Refills: 0 | Status: DISCONTINUED | OUTPATIENT
Start: 2025-05-14 | End: 2025-05-19

## 2025-05-13 RX ORDER — CLONAZEPAM 0.5 MG/1
0.5 TABLET ORAL AT BEDTIME
Refills: 0 | Status: DISCONTINUED | OUTPATIENT
Start: 2025-05-13 | End: 2025-05-19

## 2025-05-13 RX ORDER — OXYBUTYNIN CHLORIDE 5 MG/1
5 TABLET, FILM COATED, EXTENDED RELEASE ORAL
Refills: 0 | Status: DISCONTINUED | OUTPATIENT
Start: 2025-05-13 | End: 2025-05-13

## 2025-05-13 RX ORDER — ONDANSETRON HCL/PF 4 MG/2 ML
4 VIAL (ML) INJECTION ONCE
Refills: 0 | Status: DISCONTINUED | OUTPATIENT
Start: 2025-05-13 | End: 2025-05-19

## 2025-05-13 RX ORDER — MAGNESIUM HYDROXIDE 400 MG/5ML
30 SUSPENSION ORAL DAILY
Refills: 0 | Status: DISCONTINUED | OUTPATIENT
Start: 2025-05-13 | End: 2025-05-19

## 2025-05-13 RX ORDER — ACETAMINOPHEN 500 MG/5ML
1000 LIQUID (ML) ORAL EVERY 6 HOURS
Refills: 0 | Status: COMPLETED | OUTPATIENT
Start: 2025-05-13 | End: 2025-05-14

## 2025-05-13 RX ORDER — HYDROMORPHONE/SOD CHLOR,ISO/PF 2 MG/10 ML
0.2 SYRINGE (ML) INJECTION
Refills: 0 | Status: DISCONTINUED | OUTPATIENT
Start: 2025-05-13 | End: 2025-05-19

## 2025-05-13 RX ORDER — SENNA 187 MG
2 TABLET ORAL AT BEDTIME
Refills: 0 | Status: DISCONTINUED | OUTPATIENT
Start: 2025-05-13 | End: 2025-05-19

## 2025-05-13 RX ORDER — ONDANSETRON HCL/PF 4 MG/2 ML
4 VIAL (ML) INJECTION EVERY 6 HOURS
Refills: 0 | Status: DISCONTINUED | OUTPATIENT
Start: 2025-05-13 | End: 2025-05-19

## 2025-05-13 RX ORDER — ASPIRIN 325 MG
81 TABLET ORAL EVERY 12 HOURS
Refills: 0 | Status: DISCONTINUED | OUTPATIENT
Start: 2025-05-14 | End: 2025-05-19

## 2025-05-13 RX ORDER — QUETIAPINE FUMARATE 25 MG/1
200 TABLET ORAL AT BEDTIME
Refills: 0 | Status: DISCONTINUED | OUTPATIENT
Start: 2025-05-13 | End: 2025-05-19

## 2025-05-13 RX ORDER — POLYETHYLENE GLYCOL 3350 17 G/17G
17 POWDER, FOR SOLUTION ORAL AT BEDTIME
Refills: 0 | Status: DISCONTINUED | OUTPATIENT
Start: 2025-05-13 | End: 2025-05-19

## 2025-05-13 RX ORDER — ATORVASTATIN CALCIUM 80 MG/1
1 TABLET, FILM COATED ORAL
Refills: 0 | DISCHARGE

## 2025-05-13 RX ADMIN — ATORVASTATIN CALCIUM 20 MILLIGRAM(S): 80 TABLET, FILM COATED ORAL at 21:42

## 2025-05-13 RX ADMIN — QUETIAPINE FUMARATE 200 MILLIGRAM(S): 25 TABLET ORAL at 21:42

## 2025-05-13 RX ADMIN — SODIUM CHLORIDE 75 MILLILITER(S): 9 INJECTION, SOLUTION INTRAVENOUS at 14:28

## 2025-05-13 RX ADMIN — Medication 500 MILLILITER(S): at 19:15

## 2025-05-13 RX ADMIN — APREPITANT 40 MILLIGRAM(S): 40 CAPSULE ORAL at 07:28

## 2025-05-13 RX ADMIN — Medication 1 APPLICATION(S): at 07:28

## 2025-05-13 RX ADMIN — Medication 500 MILLILITER(S): at 14:29

## 2025-05-13 RX ADMIN — QUETIAPINE FUMARATE 50 MILLIGRAM(S): 25 TABLET ORAL at 21:43

## 2025-05-13 RX ADMIN — CELECOXIB 200 MILLIGRAM(S): 50 CAPSULE ORAL at 21:45

## 2025-05-13 RX ADMIN — Medication 400 MILLIGRAM(S): at 17:58

## 2025-05-13 RX ADMIN — Medication 1000 MILLIGRAM(S): at 17:59

## 2025-05-13 RX ADMIN — Medication 500 MILLIGRAM(S): at 21:42

## 2025-05-13 RX ADMIN — Medication 100 MILLIGRAM(S): at 21:42

## 2025-05-13 RX ADMIN — CELECOXIB 200 MILLIGRAM(S): 50 CAPSULE ORAL at 21:42

## 2025-05-13 RX ADMIN — Medication 100 MILLIGRAM(S): at 19:01

## 2025-05-13 RX ADMIN — GABAPENTIN 300 MILLIGRAM(S): 400 CAPSULE ORAL at 17:58

## 2025-05-13 NOTE — DISCHARGE NOTE PROVIDER - NSDCFUADDAPPT_GEN_ALL_CORE_FT
It is advisable to follow up with your primary care provider within the next 2-3 weeks to ensure your medications are appropriate and there are no underlying problems after your procedure.    Call Dr. Deleon's office to schedule 2 week follow up appointment.

## 2025-05-13 NOTE — DISCHARGE NOTE PROVIDER - CARE PROVIDER_API CALL
Lennox Deleon  Orthopaedic Surgery  [address], [address]  [place] [address]  Phone: [phone]  Fax: [phone]  Follow Up Time: 2 weeks

## 2025-05-13 NOTE — CONSULT NOTE ADULT - ASSESSMENT
79 yo female, pmh of schizophrenia, hld, OA, overactive bladder, chronic constipation, presenting for right knee replacement.     right knee replacement  - pain control - standing tylenol 1000 q8h,  - celebrex 200 bid  oxycodone 5/10 prn for mod/severe pain, dilaudid iv for breakthrough pain  - bowel regimen   -pt/ot  - ivf  - aspirin 81 bid for dvt ppx    schizophrenia  - continue home seroquel, trazodone, depakote, paliperidone  - on klonopin at bedtime, advised against concomitant use of opiates with benzodiazepines to avoid oversedation, patient verbalized understanding    hld - on lipitor

## 2025-05-13 NOTE — PHYSICAL THERAPY INITIAL EVALUATION ADULT - ADDITIONAL COMMENTS
Pt lives between her assisted living facility and her families home. Families home has several steps to enter. Unable to recall how many steps. Assisted living does not require any steps. Pt explains she was independent prior. Needs RW. Pt requesting BRITTANEY as she is anxious about needing assistance.

## 2025-05-13 NOTE — DISCHARGE NOTE PROVIDER - CARE PROVIDERS DIRECT ADDRESSES
[email or web address]

## 2025-05-13 NOTE — DISCHARGE NOTE PROVIDER - NSDCCPCAREPLAN_GEN_ALL_CORE_FT
PRINCIPAL DISCHARGE DIAGNOSIS  Diagnosis: Primary osteoarthritis of right knee  Assessment and Plan of Treatment: For your total knee replacement:  Physical Therapy/Occupational Therapy for: ambulation, transfers, stairs, ADL's (activities of daily living), range of motion exercises, and isometrics  -Activity  • Weight Bearing as tolerated with rolling walker.  • Ice pack 30 minutes several times daily with at least a 1 hour break in between icing sessions  • Take short, frequent walks increasing the distance that you walk each day as tolerated.  • Change your position every hour to decrease pain and stiffness.  • Continue the exercises taught to you by your physical therapist.  • No driving until cleared by the doctor.  • No tub baths, hot tubs, or swimming pools until instructed by your doctor.  • Do not squat down on the floor.  • Do not kneel or twist your knee.  • Range of Motion Goals: Flexion= 120 degrees, Extension = 0 degrees  Daily dressing changes if incision is not completely dry.  If inicision is dry, it may be left open to air.  Keep incision clean. DO NOT APPLY ANYTHING to incision site (salves/ointments/creams).  May shower post-op if no drainage from incision.  Do not scrub incision site. Pat dry after shower.  Mepilex [place] dressing is over your surgial incision.  It is waterproof and you may shower.  Do not aim shower stream at surgical site and pat dry with clean towel after showering.   Remove Mepilex dressing 7 days after surgery and leave wound open to air.  Suture/Prineo tape removal 2 weeks after surgery at Surgeon's office.

## 2025-05-13 NOTE — PATIENT PROFILE ADULT - FALL HARM RISK - UNIVERSAL INTERVENTIONS
Bed in lowest position, wheels locked, appropriate side rails in place/Call bell, personal items and telephone in reach/Instruct patient to call for assistance before getting out of bed or chair/Non-slip footwear when patient is out of bed/[place] to call system/Physically safe environment - no spills, clutter or unnecessary equipment/Purposeful Proactive Rounding/Room/bathroom lighting operational, light cord in reach

## 2025-05-13 NOTE — DISCHARGE NOTE PROVIDER - NSDCFUSCHEDAPPT_GEN_ALL_CORE_FT
Magnolia Pan  [facility]  UROGYN [facility]  Scheduled Appointment: 07/01/2025

## 2025-05-13 NOTE — DISCHARGE NOTE PROVIDER - HOSPITAL COURSE
This patient was admitted to [facility] with a history of severe degenerative joint disease of the right knee.  Patient underwent Pre-Surgical Testing and was medically cleared to undergo elective procedure. Patient underwent right total knee replacement by Dr. Deleon  . Procedure was well tolerated.  No operative or katie-operative complications arose during patient's hospital course.  Patient received antibiotic according to SCIP guidelines for infection prevention. Aspirin 81 mg Q12h was given for DVT prophylaxis, in addition to the use of SCDs.  Anesthesia, Medical Hospitalist, Physical Therapy and Occupational Therapy were consulted. Patient is stable for discharge with a good prognosis.  Appropriate discharge instructions and medications are provided in this document. Patient is going home with home care PT     This patient was admitted to [facility] with a history of severe degenerative joint disease of the right knee.  Patient underwent Pre-Surgical Testing and was medically cleared to undergo elective procedure. Patient underwent right total knee replacement by Dr. Deleon  . Procedure was well tolerated.  No operative or katie-operative complications arose during patient's hospital course.  Patient received antibiotic according to SCIP guidelines for infection prevention. Aspirin 81 mg Q12h was given for DVT prophylaxis, in addition to the use of SCDs.  Anesthesia, Medical Hospitalist, Physical Therapy and Occupational Therapy were consulted. Patient is stable for discharge with a good prognosis.  Appropriate discharge instructions and medications are provided in this document. Patient to be discharged to rehab.

## 2025-05-13 NOTE — PHYSICAL THERAPY INITIAL EVALUATION ADULT - IMPAIRED TRANSFERS: SIT/STAND, REHAB EVAL
Done   
Patient needs a refill      pantoprazole (PROTONIX) 40 MG tablet     [facility]  90 days    
impaired balance/narrow base of support/impaired postural control/decreased ROM/decreased sensation/decreased strength

## 2025-05-13 NOTE — BRIEF OPERATIVE NOTE - NSICDXBRIEFPROCEDURE_GEN_ALL_CORE_FT
PROCEDURES:  Arthroplasty, knee, total, robot-assisted, using JIMMY system 13-May-2025 13:46:45 Right Viraj Kraft

## 2025-05-13 NOTE — DISCHARGE NOTE PROVIDER - NSDCCPTREATMENT_GEN_ALL_CORE_FT
PRINCIPAL PROCEDURE  Procedure: Arthroplasty, knee, total, robot-assisted, using JIMMY system  Findings and Treatment: Right knee osteoarthritis

## 2025-05-13 NOTE — DISCHARGE NOTE PROVIDER - NSDCQMSTAIRS_GEN_ALL_CORE
Problem: At Risk for Falls  Goal: Patient does not fall  Outcome: Monitoring/Evaluating progress  Goal: Patient takes action to control fall-related risks  Outcome: Monitoring/Evaluating progress     Problem: Pain  Goal: Acceptable pain level achieved/maintained at rest using appropriate pain scale for the patient  Outcome: Monitoring/Evaluating progress  Goal: Acceptable pain level achieved/maintained with activity using appropriate pain scale for the patient  Outcome: Monitoring/Evaluating progress  Goal: Acceptable pain level achieved/maintained without oversedation  Outcome: Monitoring/Evaluating progress     Problem: Skin Integrity Alteration  Goal: Skin remains intact with no new/deterioration of wound or pressure injury  Outcome: Monitoring/Evaluating progress  Goal: Participates in wound care activities  Outcome: Monitoring/Evaluating progress     Problem: Impaired Physical Mobility  Goal: Functional status is maintained or returned to baseline during hospitalization  Outcome: Monitoring/Evaluating progress  Goal: Tolerates activity for discharge setting with no abnormal symptoms  Outcome: Monitoring/Evaluating progress      No Yes

## 2025-05-13 NOTE — PATIENT PROFILE ADULT - SAFE PLACE TO LIVE
65F, hx of CHF (last TTE on 1/16/25 EF 30-35%, G2DD), DM, diabetic foot ulcer with a recent admission at [facility] for CHF exacerbation 1/14-1/17 p/w worsening R. leg pain and fluid secretion, was meeting sepsis criteria with podiatry having low suspicion for right cellulitis and admitted for continued management of HF exacerbation and needing ischemic eval.     Found to have RLE coolness  -Right Leg Arterial Duplex: Popliteal artery is occluded with negligible flow of right trifurcation arteries.  -Left leg Arterial Duplex: Slightly tardus parvus waveform of the left popliteal artery is noted, for which underlying disease cannot be excluded. Posterior tibial artery waveform is nonpulsatile. Anterior tibial artery tardus parvus flow is noted.   Transferred to [facility] for CO2 angiogram as per vascular surgery    #  PAD Wound of lower extremity.   ·  Plan: Podiatry following, b/l serous bullae, no concerns for infection  Found to have RLE coolness  -Right Leg Arterial Duplex: Popliteal artery is occluded with negligible flow of right trifurcation arteries.  -Left leg Arterial Duplex: Slightly tardus parvus waveform of the left popliteal artery is noted, for which underlying disease cannot be excluded. Posterior tibial artery waveform is nonpulsatile. Anterior tibial artery tardus parvus flow is noted.  -Started on heparin gtt and dobutamine gtt -Will transfer to [facility] for CO2 angiogram as per vascular surgery as not candidate for CTA due to worsening SCr  -Keep compression dressing  -LE elevation above heart level at rest.  -Transferred to [facility] for CO2 angiogram   - Overall this patient is at   intermediate  risk (for cardiac death, nonfatal myocardial infarction, and nonfatal cardiac arrest perioperatively for this intermediate  risk procedure).   No cardiac contraindications for CO2 vangiogram  There  are  no further recommendation for risk stratifying imaging/stress testing prior to planned surgery  Plan for outpatient CO2 angiogram as per Vascular  Seen by Podiatry-No acute pod intervention, local wound care only- Pod stable for discharge     # HFrEF (congestive heart failure).   ·  Plan: Hx of HF, previous admission in January, on home Lasix 40 qD, Metoprolol Succ 25 qD. Not on Entresto due to insurance issues  Last TTE: LVSF moderately decreased w/ EF 30-35 %. Moderate G2DD. Mild MR  Stress test: small-sized, moderate defect(s) in the apical wall that is predominantly fixed suggestive of an infarction with minimal marcus-infarct ischemia  Ischemic cardiomyopathy  GDMT on hold 2/2 JAMEL  Patient on Heparin gtt change to Xarelto 2.5mg BID for PAD  Has been on  for > 5 days will stop and resume on Milrinone tomorrow  Repeat TTE EF 20% with WMA  Likely Ischemic cardiomyopathy despite NST revealing fixed defect Probably Multivessel CAD  She refused cardiac cath previously discussed with her and her brother they are now agreeable  Renal follow up optimise precath    Continue Bumex 3mg/hr   UOP 2250mL Lactate normal recieveing Diamox   Consider hypertonic saline  +/- Milrinone gtt    LE EDEMA no DVT      # JAMEL  Creatinine Trend: 2.50<--2.49<--2.42<--, 2.29<--,2.07<--2.16<-- 2.27<--2.56<--, 2.82<--, 2.98<--, 3.31<--, 2.65<--, 3.90<-- 1.17<--  Cardiorenal        no

## 2025-05-13 NOTE — DISCHARGE NOTE PROVIDER - NSDCMRMEDTOKEN_GEN_ALL_CORE_FT
aspirin 81 mg oral capsule: 1 cap(s) orally once a day   atorvastatin 20 mg oral tablet: 1 tab(s) orally once a day  clonazePAM 0.5 mg oral tablet: 1 tab(s) orally once a day (at bedtime)  divalproex sodium 500 mg oral delayed release tablet: 1 tab(s) orally once a day  divalproex sodium 500 mg oral tablet, extended release: 1 tab(s) orally once a day (at bedtime)  gabapentin 300 mg oral tablet: 1 tab(s) orally 2 times a day  Gemtesa 75 mg oral tablet: 1 tab(s) orally once a day  paliperidone 6 mg oral tablet, extended release: 1 tab(s) orally once a day (in the morning)  QUEtiapine 200 mg oral tablet: 1 tab(s) orally once a day (at bedtime)  QUEtiapine 50 mg oral tablet: 5 tab(s) orally once a day (at bedtime)  traZODone 100 mg oral tablet: orally once a day (at bedtime)  trospium 20 mg oral tablet: 1 tab(s) orally once a day   acetaminophen 500 mg oral tablet: 2 tab(s) orally every 8 hours  atorvastatin 20 mg oral tablet: 1 tab(s) orally once a day  celecoxib 200 mg oral capsule: 1 cap(s) orally every 12 hours  clonazePAM 0.5 mg oral tablet: 1 tab(s) orally once a day (at bedtime)  divalproex sodium 500 mg oral delayed release tablet: 1 tab(s) orally once a day  divalproex sodium 500 mg oral tablet, extended release: 1 tab(s) orally once a day (at bedtime)  Ecotrin Adult Low Strength 81 mg oral delayed release tablet: 1 tab(s) orally every 12 hours  gabapentin 300 mg oral tablet: 1 tab(s) orally 2 times a day  Gemtesa 75 mg oral tablet: 1 tab(s) orally once a day  oxyCODONE 10 mg oral tablet: 1 tab(s) orally every 3 hours As needed Severe Pain (7 - 10)  oxyCODONE 5 mg oral tablet: 1 tab(s) orally every 3 hours As needed Moderate Pain (4 - 6)  paliperidone 6 mg oral tablet, extended release: 1 tab(s) orally once a day (in the morning)  pantoprazole 40 mg oral delayed release tablet: 1 tab(s) orally once a day (before a meal)  polyethylene glycol 3350 oral powder for reconstitution: 17 gram(s) orally once a day (at bedtime)  QUEtiapine 200 mg oral tablet: 1 tab(s) orally once a day (at bedtime)  QUEtiapine 50 mg oral tablet: 5 tab(s) orally once a day (at bedtime)  senna leaf extract oral tablet: 2 tab(s) orally once a day (at bedtime)  traZODone 100 mg oral tablet: orally once a day (at bedtime)  trospium 20 mg oral tablet: 1 tab(s) orally once a day   acetaminophen 500 mg oral tablet: 2 tab(s) orally every 8 hours  atorvastatin 20 mg oral tablet: 1 tab(s) orally once a day  celecoxib 200 mg oral capsule: 1 cap(s) orally every 12 hours  clonazePAM 0.5 mg oral tablet: 1 tab(s) orally once a day (at bedtime)  divalproex sodium 500 mg oral delayed release tablet: 1 tab(s) orally once a day  divalproex sodium 500 mg oral tablet, extended release: 1 tab(s) orally once a day (at bedtime)  Ecotrin Adult Low Strength 81 mg oral delayed release tablet: 1 tab(s) orally every 12 hours  gabapentin 300 mg oral tablet: 1 tab(s) orally 2 times a day  Gemtesa 75 mg oral tablet: 1 tab(s) orally once a day  ocular lubricant preservative-free ophthalmic solution: 1 drop(s) to each affected eye every 3 hours As needed Dry Eyes  oxyCODONE 10 mg oral tablet: 1 tab(s) orally every 3 hours As needed Severe Pain (7 - 10)  oxyCODONE 5 mg oral tablet: 1 tab(s) orally every 3 hours As needed Moderate Pain (4 - 6)  paliperidone 6 mg oral tablet, extended release: 1 tab(s) orally once a day (in the morning)  pantoprazole 40 mg oral delayed release tablet: 1 tab(s) orally once a day (before a meal)  polyethylene glycol 3350 oral powder for reconstitution: 17 gram(s) orally once a day (at bedtime)  QUEtiapine 200 mg oral tablet: 1 tab(s) orally once a day (at bedtime)  QUEtiapine 50 mg oral tablet: 5 tab(s) orally once a day (at bedtime)  senna leaf extract oral tablet: 2 tab(s) orally once a day (at bedtime)  traZODone 100 mg oral tablet: orally once a day (at bedtime)  trospium 20 mg oral tablet: 1 tab(s) orally once a day   acetaminophen 500 mg oral tablet: 2 tab(s) orally every 8 hours  atorvastatin 20 mg oral tablet: 1 tab(s) orally once a day  bisacodyl 5 mg oral delayed release tablet: 1 tab(s) orally every 12 hours As needed Constipation  celecoxib 200 mg oral capsule: 1 cap(s) orally every 12 hours  clonazePAM 0.5 mg oral tablet: 1 tab(s) orally once a day (at bedtime)  divalproex sodium 500 mg oral delayed release tablet: 1 tab(s) orally once a day  divalproex sodium 500 mg oral tablet, extended release: 1 tab(s) orally once a day (at bedtime)  Ecotrin Adult Low Strength 81 mg oral delayed release tablet: 1 tab(s) orally every 12 hours  gabapentin 300 mg oral tablet: 1 tab(s) orally 2 times a day  Gemtesa 75 mg oral tablet: 1 tab(s) orally once a day  Narcan 4 mg/0.1 mL nasal spray: 1 spray(s) intranasally once a day as needed for overdose  ocular lubricant preservative-free ophthalmic solution: 1 drop(s) to each affected eye every 3 hours As needed Dry Eyes  oxyCODONE 10 mg oral tablet: 1 tab(s) orally every 3 hours As needed Severe Pain (7 - 10)  oxyCODONE 5 mg oral tablet: 1 tab(s) orally every 3 hours As needed Moderate Pain (4 - 6)  paliperidone 6 mg oral tablet, extended release: 1 tab(s) orally once a day (in the morning)  pantoprazole 40 mg oral delayed release tablet: 1 tab(s) orally once a day (before a meal)  polyethylene glycol 3350 oral powder for reconstitution: 17 gram(s) orally once a day (at bedtime)  QUEtiapine 200 mg oral tablet: 1 tab(s) orally once a day (at bedtime)  QUEtiapine 50 mg oral tablet: 5 tab(s) orally once a day (at bedtime)  senna leaf extract oral tablet: 2 tab(s) orally once a day (at bedtime)  traZODone 100 mg oral tablet: orally once a day (at bedtime)  trospium 20 mg oral tablet: 1 tab(s) orally once a day   Alert and oriented to person, place and time

## 2025-05-13 NOTE — CONSULT NOTE ADULT - SUBJECTIVE AND OBJECTIVE BOX
HPI:  81 yo female,    PAST MEDICAL & SURGICAL HISTORY:  Schizo affective schizophrenia      Depression      Dementia      Schizophrenia      Osteoarthritis of right knee      Dyslipidemia      Hypothyroidism      Seasonal allergies      OAB (overactive bladder)      Urinary incontinence      Chronic constipation      No significant past surgical history          ANTIMICROBIAL:    CARDIOVASCULAR:    PULMONARY:    NEUROLOGIC:  clonazePAM  Tablet 0.5 milliGRAM(s) Oral at bedtime PRN  divalproex  milliGRAM(s) Oral daily  divalproex  milliGRAM(s) Oral at bedtime  gabapentin 300 milliGRAM(s) Oral two times a day  HYDROmorphone  Injectable 0.2 milliGRAM(s) IV Push every 10 minutes PRN  HYDROmorphone  Injectable 0.5 milliGRAM(s) IV Push every 10 minutes PRN  ondansetron Injectable 4 milliGRAM(s) IV Push once PRN  paliperidone ER. 6 milliGRAM(s) Oral daily  QUEtiapine 200 milliGRAM(s) Oral at bedtime  QUEtiapine 50 milliGRAM(s) Oral at bedtime  traZODone 100 milliGRAM(s) Oral at bedtime    ONCOLOGIC:    HEMATOLOGIC:    GATROINTESTINAL:     MEDS:  oxybutynin 5 milliGRAM(s) Oral two times a day    ENDO/METABOLIC:  atorvastatin 20 milliGRAM(s) Oral at bedtime    IV FLUID/NUTRITION:  lactated ringers. 1000 milliLiter(s) IV Continuous <Continuous>    TOPICAL:    IMMUNOLOGIC & OTHER        Allergies    No Known Allergies    Intolerances        SOCIAL HISTORY:    FAMILY HISTORY:  FH: type 2 diabetes (Father, Mother)    Family history of uterine cancer (Child)    Family history of breast cancer (Sibling)        Vital Signs Last 24 Hrs  T(C): 36.6 (13 May 2025 13:35), Max: 36.6 (13 May 2025 13:35)  T(F): 97.8 (13 May 2025 13:35), Max: 97.8 (13 May 2025 13:35)  HR: 73 (13 May 2025 14:35) (71 - 84)  BP: 138/68 (13 May 2025 14:35) (109/57 - 141/78)  BP(mean): --  RR: 12 (13 May 2025 14:35) (12 - 18)  SpO2: 100% (13 May 2025 14:35) (98% - 100%)    Parameters below as of 13 May 2025 13:35  Patient On (Oxygen Delivery Method): room air          I&O's Detail    13 May 2025 07:01  -  13 May 2025 15:25  --------------------------------------------------------  IN:    Lactated Ringers: 1300 mL  Total IN: 1300 mL    OUT:    Blood Loss (mL): 150 mL  Total OUT: 150 mL    Total NET: 1150 mL        Daily Height in cm: 158.75 (13 May 2025 07:50)    Daily     REVIEW OF SYSTEMS:  as per hpi, other systems reviewed and are negative    PHYSICAL EXAM:    GENERAL: NAD, well-groomed, well-developed  HEAD:  Atraumatic, Normocephalic  EYES: EOMI, PERRLA, conjunctiva and sclera clear  ENMT: No tonsillar erythema, exudates, or enlargement; Moist mucous membranes, No lesions  NECK: Supple, No JVD, Normal thyroid  NERVOUS SYSTEM:  Alert & Oriented X3, Good concentration; Motor Strength 5/5 B/L upper and lower extremities  CHEST/LUNG: Clear to auscultation bilaterally; No rales, rhonchi, wheezing, or rubs  HEART: Regular rate and rhythm; No murmurs, rubs, or gallops  ABDOMEN: Soft, Nontender, Nondistended; Bowel sounds present  EXTREMITIES:  2+ Peripheral Pulses, No clubbing, cyanosis, or edema  LYMPH: No lymphadenopathy   SKIN: No rashes or lesions      LABS:                      RADIOLOGY & ADDITIONAL STUDIES: HPI:  79 yo female, pmh of schizophrenia, hld, OA, overactive bladder, chronic constipation, presenting for right knee replacement. Seen in pacu, denies pain, right leg still numb after spinal anesthesia. No dizziness, nausea, vomiting, fever, chills.  PAST MEDICAL & SURGICAL HISTORY:  Schizo affective schizophrenia      Depression      Dementia      Schizophrenia      Osteoarthritis of right knee      Dyslipidemia      Hypothyroidism      Seasonal allergies      OAB (overactive bladder)      Urinary incontinence      Chronic constipation      No significant past surgical history          ANTIMICROBIAL:    CARDIOVASCULAR:    PULMONARY:    NEUROLOGIC:  clonazePAM  Tablet 0.5 milliGRAM(s) Oral at bedtime PRN  divalproex  milliGRAM(s) Oral daily  divalproex  milliGRAM(s) Oral at bedtime  gabapentin 300 milliGRAM(s) Oral two times a day  HYDROmorphone  Injectable 0.2 milliGRAM(s) IV Push every 10 minutes PRN  HYDROmorphone  Injectable 0.5 milliGRAM(s) IV Push every 10 minutes PRN  ondansetron Injectable 4 milliGRAM(s) IV Push once PRN  paliperidone ER. 6 milliGRAM(s) Oral daily  QUEtiapine 200 milliGRAM(s) Oral at bedtime  QUEtiapine 50 milliGRAM(s) Oral at bedtime  traZODone 100 milliGRAM(s) Oral at bedtime    ONCOLOGIC:    HEMATOLOGIC:    GATROINTESTINAL:     MEDS:  oxybutynin 5 milliGRAM(s) Oral two times a day    ENDO/METABOLIC:  atorvastatin 20 milliGRAM(s) Oral at bedtime    IV FLUID/NUTRITION:  lactated ringers. 1000 milliLiter(s) IV Continuous <Continuous>    TOPICAL:    IMMUNOLOGIC & OTHER        Allergies    No Known Allergies    Intolerances        SOCIAL HISTORY:  fromer smoker quit 10 years ago, no etoh use    FAMILY HISTORY:  FH: type 2 diabetes (Father, Mother)    Family history of uterine cancer (Child)    Family history of breast cancer (Sibling)        Vital Signs Last 24 Hrs  T(C): 36.6 (13 May 2025 13:35), Max: 36.6 (13 May 2025 13:35)  T(F): 97.8 (13 May 2025 13:35), Max: 97.8 (13 May 2025 13:35)  HR: 73 (13 May 2025 14:35) (71 - 84)  BP: 138/68 (13 May 2025 14:35) (109/57 - 141/78)  BP(mean): --  RR: 12 (13 May 2025 14:35) (12 - 18)  SpO2: 100% (13 May 2025 14:35) (98% - 100%)    Parameters below as of 13 May 2025 13:35  Patient On (Oxygen Delivery Method): room air          I&O's Detail    13 May 2025 07:01  -  13 May 2025 15:25  --------------------------------------------------------  IN:    Lactated Ringers: 1300 mL  Total IN: 1300 mL    OUT:    Blood Loss (mL): 150 mL  Total OUT: 150 mL    Total NET: 1150 mL        Daily Height in cm: 158.75 (13 May 2025 07:50)    Daily     REVIEW OF SYSTEMS:  as per hpi, other systems reviewed and are negative    PHYSICAL EXAM:    GENERAL: NAD, well-groomed, older female  HEAD:  Atraumatic, Normocephalic  EYES: EOMI, PERRLA, conjunctiva and sclera clear  ENMT: No tonsillar erythema, exudates, or enlargement; Moist mucous membranes, No lesions  NECK: Supple, No JVD   NERVOUS SYSTEM:  Alert & Oriented X3, Good concentration; moving all extremities  CHEST/LUNG: Clear to auscultation bilaterally; No rales, rhonchi, wheezing, or rubs  HEART: Regular rate and rhythm; No murmurs, rubs, or gallops  ABDOMEN: Soft, Nontender, Nondistended; Bowel sounds present  EXTREMITIES:  2+ Peripheral Pulses, right knee wrapped in ace bandage  SKIN: No rashes or lesions      LABS:          Reviewed presurgical H+P, presurgical labs             RADIOLOGY & ADDITIONAL STUDIES:

## 2025-05-14 LAB
ANION GAP SERPL CALC-SCNC: 11 MMOL/L — SIGNIFICANT CHANGE UP (ref 5–17)
BUN SERPL-MCNC: 22 MG/DL — SIGNIFICANT CHANGE UP (ref 7–23)
CALCIUM SERPL-MCNC: 8.7 MG/DL — SIGNIFICANT CHANGE UP (ref 8.4–10.5)
CHLORIDE SERPL-SCNC: 105 MMOL/L — SIGNIFICANT CHANGE UP (ref 96–108)
CO2 SERPL-SCNC: 23 MMOL/L — SIGNIFICANT CHANGE UP (ref 22–31)
CREAT SERPL-MCNC: 1 MG/DL — SIGNIFICANT CHANGE UP (ref 0.5–1.3)
EGFR: 58 ML/MIN/1.73M2 — LOW
EGFR: 58 ML/MIN/1.73M2 — LOW
GLUCOSE SERPL-MCNC: 100 MG/DL — HIGH (ref 70–99)
HCT VFR BLD CALC: 31.8 % — LOW (ref 34.5–45)
HGB BLD-MCNC: 10.4 G/DL — LOW (ref 11.5–15.5)
MCHC RBC-ENTMCNC: 30.3 PG — SIGNIFICANT CHANGE UP (ref 27–34)
MCHC RBC-ENTMCNC: 32.7 G/DL — SIGNIFICANT CHANGE UP (ref 32–36)
MCV RBC AUTO: 92.7 FL — SIGNIFICANT CHANGE UP (ref 80–100)
NRBC BLD AUTO-RTO: 0 /100 WBCS — SIGNIFICANT CHANGE UP (ref 0–0)
PLATELET # BLD AUTO: 260 K/UL — SIGNIFICANT CHANGE UP (ref 150–400)
POTASSIUM SERPL-MCNC: 4.1 MMOL/L — SIGNIFICANT CHANGE UP (ref 3.5–5.3)
POTASSIUM SERPL-SCNC: 4.1 MMOL/L — SIGNIFICANT CHANGE UP (ref 3.5–5.3)
RBC # BLD: 3.43 M/UL — LOW (ref 3.8–5.2)
RBC # FLD: 13 % — SIGNIFICANT CHANGE UP (ref 10.3–14.5)
SODIUM SERPL-SCNC: 139 MMOL/L — SIGNIFICANT CHANGE UP (ref 135–145)
WBC # BLD: 14.2 K/UL — HIGH (ref 3.8–10.5)
WBC # FLD AUTO: 14.2 K/UL — HIGH (ref 3.8–10.5)

## 2025-05-14 PROCEDURE — 99221 1ST HOSP IP/OBS SF/LOW 40: CPT

## 2025-05-14 PROCEDURE — 99232 SBSQ HOSP IP/OBS MODERATE 35: CPT

## 2025-05-14 RX ORDER — OXYCODONE HYDROCHLORIDE 30 MG/1
1 TABLET ORAL
Qty: 0 | Refills: 0 | DISCHARGE
Start: 2025-05-14

## 2025-05-14 RX ORDER — LANOLIN/MINERAL OIL/PETROLATUM
1 OINTMENT (GRAM) OPHTHALMIC (EYE)
Refills: 0 | Status: DISCONTINUED | OUTPATIENT
Start: 2025-05-14 | End: 2025-05-19

## 2025-05-14 RX ORDER — POLYETHYLENE GLYCOL 3350 17 G/17G
17 POWDER, FOR SOLUTION ORAL
Qty: 0 | Refills: 0 | DISCHARGE
Start: 2025-05-14

## 2025-05-14 RX ORDER — ACETAMINOPHEN 500 MG/5ML
2 LIQUID (ML) ORAL
Qty: 0 | Refills: 0 | DISCHARGE
Start: 2025-05-14

## 2025-05-14 RX ORDER — CELECOXIB 50 MG/1
1 CAPSULE ORAL
Qty: 0 | Refills: 0 | DISCHARGE
Start: 2025-05-14

## 2025-05-14 RX ORDER — LANOLIN/MINERAL OIL/PETROLATUM
1 OINTMENT (GRAM) OPHTHALMIC (EYE)
Qty: 0 | Refills: 0 | DISCHARGE
Start: 2025-05-14

## 2025-05-14 RX ORDER — SENNA 187 MG
2 TABLET ORAL
Qty: 0 | Refills: 0 | DISCHARGE
Start: 2025-05-14

## 2025-05-14 RX ORDER — ASPIRIN 325 MG
1 TABLET ORAL
Qty: 0 | Refills: 0 | DISCHARGE
Start: 2025-05-14

## 2025-05-14 RX ADMIN — Medication 100 MILLIGRAM(S): at 21:38

## 2025-05-14 RX ADMIN — Medication 81 MILLIGRAM(S): at 05:49

## 2025-05-14 RX ADMIN — ATORVASTATIN CALCIUM 20 MILLIGRAM(S): 80 TABLET, FILM COATED ORAL at 21:39

## 2025-05-14 RX ADMIN — Medication 500 MILLIGRAM(S): at 21:38

## 2025-05-14 RX ADMIN — CELECOXIB 200 MILLIGRAM(S): 50 CAPSULE ORAL at 21:39

## 2025-05-14 RX ADMIN — Medication 100 MILLIGRAM(S): at 03:32

## 2025-05-14 RX ADMIN — Medication 1 DROP(S): at 12:11

## 2025-05-14 RX ADMIN — CELECOXIB 200 MILLIGRAM(S): 50 CAPSULE ORAL at 10:13

## 2025-05-14 RX ADMIN — Medication 1000 MILLIGRAM(S): at 05:48

## 2025-05-14 RX ADMIN — Medication 1000 MILLIGRAM(S): at 13:29

## 2025-05-14 RX ADMIN — Medication 1000 MILLIGRAM(S): at 21:47

## 2025-05-14 RX ADMIN — CELECOXIB 200 MILLIGRAM(S): 50 CAPSULE ORAL at 21:47

## 2025-05-14 RX ADMIN — Medication 81 MILLIGRAM(S): at 17:25

## 2025-05-14 RX ADMIN — GABAPENTIN 300 MILLIGRAM(S): 400 CAPSULE ORAL at 17:25

## 2025-05-14 RX ADMIN — Medication 500 MILLIGRAM(S): at 12:05

## 2025-05-14 RX ADMIN — GABAPENTIN 300 MILLIGRAM(S): 400 CAPSULE ORAL at 05:49

## 2025-05-14 RX ADMIN — Medication 1000 MILLIGRAM(S): at 00:27

## 2025-05-14 RX ADMIN — QUETIAPINE FUMARATE 50 MILLIGRAM(S): 25 TABLET ORAL at 21:38

## 2025-05-14 RX ADMIN — Medication 40 MILLIGRAM(S): at 05:48

## 2025-05-14 RX ADMIN — QUETIAPINE FUMARATE 200 MILLIGRAM(S): 25 TABLET ORAL at 21:38

## 2025-05-14 RX ADMIN — Medication 400 MILLIGRAM(S): at 00:25

## 2025-05-14 RX ADMIN — Medication 1000 MILLIGRAM(S): at 05:50

## 2025-05-14 RX ADMIN — CELECOXIB 200 MILLIGRAM(S): 50 CAPSULE ORAL at 10:08

## 2025-05-14 RX ADMIN — Medication 1000 MILLIGRAM(S): at 13:28

## 2025-05-14 RX ADMIN — Medication 1000 MILLIGRAM(S): at 21:38
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
## 2025-05-14 NOTE — CARE COORDINATION ASSESSMENT. - REFERRAL INFORMATION CONCERNS
resides in [place] stays with sister wants rehab at  [facility] and willing to consider [facility]/concerns to be addressed

## 2025-05-14 NOTE — SOCIAL WORK PROGRESS NOTE - NSSWPROGRESSNOTE_GEN_ALL_CORE
Patient referred to AR at [facility] and I spoke with nurse screener who wanted psych note. I spoke with Dr Grajeda who can see patient. [facility] said they can open case if accepted and provide reference number where SW can send clinical. Patient was also accepted for [facility] at [facility] and to [facility] center pending author. SW to follow for possible [facility] vs [facility]

## 2025-05-14 NOTE — CARE COORDINATION ASSESSMENT. - NSCAREPROVIDERS_GEN_ALL_CORE_FT
CARE PROVIDERS:  Administration: Kinza Cardoza  Admitting: Lennox Deleon  Attending: Lennox Deleon  Consultant: González Modi  Consultant: Daniel Robledo  Infection Control: Jaja Justice  Nurse: Anjelica Godfrey  Nurse: Che Dominguez  Nurse: Tammi Boyd  Occupational Therapy: Santy Vargas  Override: Che Dominguez  PCA/Nursing Assistant: Lilliam Camacho  PCA/Nursing Assistant: Divina Thomas  Physical Therapy: Bianca Colmenares  Primary Team: Andrew Dumas  Primary Team: Ольга Pete  Primary Team: Viraj Kraft  Primary Team: Mukul Duong  Primary Team: Brianne Anderson  Primary Team: Kita Butts  Research: Franklin Muñoz  : Lorrie Giraldo  Student: Lissett Newell  Team: JACQUES  Hospitalists, Team  UR// Supp. Assoc.: Saba Dyson

## 2025-05-14 NOTE — PATIENT CHOICE NOTE. - NSPTCHOICENOTES_GEN_ALL_CORE
transition of care packet provided to daughter she asked me to speak with daughter Dixie [phone] message left

## 2025-05-14 NOTE — BH CONSULTATION LIAISON ASSESSMENT NOTE - CURRENT MEDICATION
MEDICATIONS  (STANDING):  acetaminophen     Tablet .. 1000 milliGRAM(s) Oral every 8 hours  aspirin enteric coated 81 milliGRAM(s) Oral every 12 hours  atorvastatin 20 milliGRAM(s) Oral at bedtime  celecoxib 200 milliGRAM(s) Oral every 12 hours  divalproex  milliGRAM(s) Oral daily  divalproex  milliGRAM(s) Oral at bedtime  gabapentin 300 milliGRAM(s) Oral two times a day  lactated ringers. 1000 milliLiter(s) (100 mL/Hr) IV Continuous <Continuous>  lactated ringers. 1000 milliLiter(s) (75 mL/Hr) IV Continuous <Continuous>  paliperidone ER. 6 milliGRAM(s) Oral daily  pantoprazole    Tablet 40 milliGRAM(s) Oral before breakfast  polyethylene glycol 3350 17 Gram(s) Oral at bedtime  QUEtiapine 200 milliGRAM(s) Oral at bedtime  QUEtiapine 50 milliGRAM(s) Oral at bedtime  senna 2 Tablet(s) Oral at bedtime  traZODone 100 milliGRAM(s) Oral at bedtime    MEDICATIONS  (PRN):  artificial  tears Solution 1 Drop(s) Both EYES every 3 hours PRN Dry Eyes  clonazePAM  Tablet 0.5 milliGRAM(s) Oral at bedtime PRN anxeity  HYDROmorphone  Injectable 0.2 milliGRAM(s) IV Push every 10 minutes PRN Moderate Pain (4 - 6)  HYDROmorphone  Injectable 0.5 milliGRAM(s) IV Push every 10 minutes PRN Severe Pain (7 - 10)  HYDROmorphone  Injectable 0.5 milliGRAM(s) IV Push every 3 hours PRN Breakthrough pain  magnesium hydroxide Suspension 30 milliLiter(s) Oral daily PRN Constipation  ondansetron Injectable 4 milliGRAM(s) IV Push once PRN Nausea and/or Vomiting  ondansetron Injectable 4 milliGRAM(s) IV Push every 6 hours PRN Nausea and/or Vomiting  oxyCODONE    IR 5 milliGRAM(s) Oral every 3 hours PRN Moderate Pain (4 - 6)  oxyCODONE    IR 10 milliGRAM(s) Oral every 3 hours PRN Severe Pain (7 - 10)

## 2025-05-14 NOTE — BH CONSULTATION LIAISON ASSESSMENT NOTE - NSBHROSSTATEMENT_PSY_A_CORE
Goal Outcome Evaluation:      Plan of Care Reviewed With: patient    Overall Patient Progress: no change       [id number]    Pt is NPO, meds through [facility]. Comfort care maintained. Pt denies pain, incontinent of b/b. R PIV SL. Pt turn/reposition as scheduled. No acute event noted this shift. Call light within reach, plan of care ongoing.   .

## 2025-05-14 NOTE — SOCIAL WORK PROGRESS NOTE - NSSWPROGRESSNOTE_GEN_ALL_CORE
Patient accepted to AR at [facility] with approval and they will open case. they requested clearance from psychiatry so I reached out to Dr Grajeda who will see patient. [facility] asking me to advise patient and family that if approved should expect 7-8 days. I met with patient to advise of this and she agrees with plan. I also explained that she was accepted to [facility] as backup to [facility] if not approved and she accepted this as well. Plan for [facility] vs [facility] pending insurance

## 2025-05-14 NOTE — CARE COORDINATION ASSESSMENT. - PRO ARRIVE FROM
[facility] and patient reports sometimes she stays with her sister/assisted living facility

## 2025-05-14 NOTE — BH CONSULTATION LIAISON ASSESSMENT NOTE - DESCRIPTION
living with sister and brother in law for a year and half (previously relayed she can't afford to live alone).  x 10 years. has three adult children. ages 50, 47 and 37, 3 sisters and 1 brother. She relays having a good relationship with all her family members up until this incident in December.

## 2025-05-14 NOTE — BH CONSULTATION LIAISON ASSESSMENT NOTE - HPI (INCLUDE ILLNESS QUALITY, SEVERITY, DURATION, TIMING, CONTEXT, MODIFYING FACTORS, ASSOCIATED SIGNS AND SYMPTOMS)
Patient seen, evaluated and chart reviewed. Patient is a 79 y/o , retired female who lives in [facility], past psychiatric history of schizophrenia vs bipolar disorder, prior hospitalizations (last >10 years ago), no prior suicide attempts, and past medical history of hyperlipidemia, hld, OA, overactive bladder, chronic constipation, presenting for right knee replacement. Seen in pacu, denies pain, right leg still numb after spinal anesthesia. No dizziness, nausea, vomiting, fever, chills. Patient underwent right knee replacement and the issue of psychiatric clearance was raised. At this time patient is cooperative, with no evidence of acute psychosis, denia or depression. Sister present at bedside confirms no change in baseline.

## 2025-05-14 NOTE — CARE COORDINATION ASSESSMENT. - OTHER PERTINENT DISCHARGE PLANNING INFORMATION:
ELI met with this 80 year old female admitted from [facility] but stays with her sister as well in [place]. Patient s/p right TKR by Dr Deleon. Patient presents as A&OX 4. She said I can call her family she prefers her daughter Dixie [phone]. I called her and left message, patient has history of schizophrenia. She was last hospitalized for mental health issues over 20 years ago. She lives at [facility] but states she also stays with her sister. ELI explained rehab to her and she wanted referral to [facility] and would consider [facility] at [facility] and maybe [facility] but wanted me to discuss with her daughter. ELI requested [facility] and will follow up with team for plan

## 2025-05-14 NOTE — SOCIAL WORK PROGRESS NOTE - NSSWPROGRESSNOTE_GEN_ALL_CORE
sW spoke with daughter via phone and explained rehab process for [facility] as well as for [facility]. Post rehab plan will be for patient to either return to [facility] or stay with one of her sister's Anges geetha who lives in [address]. Her other sister Swathi lives in [place]. ELI will refer to rehab and follow for [facility] vs [facility]

## 2025-05-14 NOTE — CARE COORDINATION ASSESSMENT. - NSPASTMEDSURGHISTORY_GEN_ALL_CORE_FT
PAST MEDICAL & SURGICAL HISTORY:  Depression      Schizo affective schizophrenia      No significant past surgical history      Schizophrenia      Dementia      Chronic constipation      Urinary incontinence      OAB (overactive bladder)      Seasonal allergies      Hypothyroidism      Dyslipidemia      Osteoarthritis of right knee

## 2025-05-14 NOTE — CARE COORDINATION ASSESSMENT. - QUALITY OF FAMILY RELATIONSHIPS
Discussed with patient that she has pancreatitis. No stones on imaging. Triglyceride panel sent to lab.  Still pending.  Via shared decision-making, patient prefers to go home and wait for blood work.  Patient advised to follow-up on blood results.  If triglycerides are elevated, patient will need to come back to emergency department.  Also discussed with patient that she must return if symptoms get worse, she is unable to tolerate p.o. Patient expresses understanding.  Patient will be provided with outpatient pain control and Zofran. Discussed with patient that she has pancreatitis. No stones on imaging. Triglyceride panel sent to lab.  Still pending.  Via shared decision-making, patient prefers to go home and wait for blood work.  Patient advised to follow-up on blood results.  If triglycerides are elevated, patient will need to come back to emergency department.  Also discussed with patient that she must return if symptoms get worse, she is unable to tolerate p.o. Patient expresses understanding.  Patient will be provided with outpatient pain control and Zofran.    Pt has been tolerating PO and reports some symptomatic improvement supportive/involved/helpful

## 2025-05-14 NOTE — BH CONSULTATION LIAISON ASSESSMENT NOTE - RISK ASSESSMENT
risks include prior psychiatric illness history and presentation after a triggering event with tense home environment, otherwise no hx of SA or NSSI, no hospitalization in ten years, adherent to meds, in outpatient treatment, no substance abuse).

## 2025-05-14 NOTE — SOCIAL WORK PROGRESS NOTE - NSSWPROGRESSNOTE_GEN_ALL_CORE
Patient accepted to AR at [facility] who also wanted Dr Grajeda to see patient. I reached out to him and he will see her. [facility] setup case with insurance and provided pending author [id number] and asked for clinical to be faxed to [phone]. Backup plan is for [facility] for BR[facility]. ELI sent POA to insurance and updated patient and her daughter Dixie

## 2025-05-14 NOTE — OCCUPATIONAL THERAPY INITIAL EVALUATION ADULT - ADDITIONAL COMMENTS
Pt lives in a long term part time and family the other part of the time Pt reports she does not have ant DME/AE Independent w/ ADLs.  Pt was apprehensive to share information about [facility]

## 2025-05-14 NOTE — BH CONSULTATION LIAISON ASSESSMENT NOTE - DETAILS
relays being in residential /legal issue was "traumatic" sister is "manic depressive" patient cites "allergy" but is not able to provide offending agent or nature of adverse reaction

## 2025-05-14 NOTE — BH CONSULTATION LIAISON ASSESSMENT NOTE - SUMMARY
Patient is a 79 y/o , retired female with history of psychotic disorder status post total knee replacement

## 2025-05-14 NOTE — BH CONSULTATION LIAISON ASSESSMENT NOTE - NSBHCHARTREVIEWVS_PSY_A_CORE FT
Vital Signs Last 24 Hrs  T(C): 36.6 (14 May 2025 08:43), Max: 36.7 (13 May 2025 16:10)  T(F): 97.9 (14 May 2025 08:43), Max: 98 (13 May 2025 16:10)  HR: 72 (14 May 2025 08:43) (68 - 82)  BP: 114/70 (14 May 2025 08:43) (94/74 - 153/72)  BP(mean): --  RR: 17 (14 May 2025 08:43) (12 - 17)  SpO2: 98% (14 May 2025 08:43) (96% - 100%)    Parameters below as of 14 May 2025 08:43  Patient On (Oxygen Delivery Method): room air

## 2025-05-14 NOTE — BH CONSULTATION LIAISON ASSESSMENT NOTE - NSBHCHARTREVIEWLAB_PSY_A_CORE FT
10.4   14.20 )-----------( 260      ( 14 May 2025 06:00 )             31.8   05-14    139  |  105  |  22  ----------------------------<  100[H]  4.1   |  23  |  1.00    Ca    8.7      14 May 2025 06:00

## 2025-05-15 ENCOUNTER — TRANSCRIPTION ENCOUNTER (OUTPATIENT)
Age: 80
End: 2025-05-15

## 2025-05-15 PROCEDURE — 99232 SBSQ HOSP IP/OBS MODERATE 35: CPT

## 2025-05-15 RX ADMIN — OXYCODONE HYDROCHLORIDE 5 MILLIGRAM(S): 30 TABLET ORAL at 11:38

## 2025-05-15 RX ADMIN — Medication 100 MILLIGRAM(S): at 21:31

## 2025-05-15 RX ADMIN — QUETIAPINE FUMARATE 200 MILLIGRAM(S): 25 TABLET ORAL at 21:31

## 2025-05-15 RX ADMIN — QUETIAPINE FUMARATE 50 MILLIGRAM(S): 25 TABLET ORAL at 21:31

## 2025-05-15 RX ADMIN — Medication 2 TABLET(S): at 21:30

## 2025-05-15 RX ADMIN — Medication 40 MILLIGRAM(S): at 05:40

## 2025-05-15 RX ADMIN — Medication 500 MILLIGRAM(S): at 21:30

## 2025-05-15 RX ADMIN — GABAPENTIN 300 MILLIGRAM(S): 400 CAPSULE ORAL at 05:39

## 2025-05-15 RX ADMIN — CELECOXIB 200 MILLIGRAM(S): 50 CAPSULE ORAL at 08:09

## 2025-05-15 RX ADMIN — Medication 1000 MILLIGRAM(S): at 21:30

## 2025-05-15 RX ADMIN — Medication 81 MILLIGRAM(S): at 17:59

## 2025-05-15 RX ADMIN — OXYCODONE HYDROCHLORIDE 10 MILLIGRAM(S): 30 TABLET ORAL at 15:10

## 2025-05-15 RX ADMIN — OXYCODONE HYDROCHLORIDE 10 MILLIGRAM(S): 30 TABLET ORAL at 14:35

## 2025-05-15 RX ADMIN — ATORVASTATIN CALCIUM 20 MILLIGRAM(S): 80 TABLET, FILM COATED ORAL at 21:31

## 2025-05-15 RX ADMIN — GABAPENTIN 300 MILLIGRAM(S): 400 CAPSULE ORAL at 17:58

## 2025-05-15 RX ADMIN — Medication 1000 MILLIGRAM(S): at 06:27

## 2025-05-15 RX ADMIN — CELECOXIB 200 MILLIGRAM(S): 50 CAPSULE ORAL at 21:30

## 2025-05-15 RX ADMIN — CELECOXIB 200 MILLIGRAM(S): 50 CAPSULE ORAL at 21:34

## 2025-05-15 RX ADMIN — Medication 1000 MILLIGRAM(S): at 22:41

## 2025-05-15 RX ADMIN — Medication 81 MILLIGRAM(S): at 05:40

## 2025-05-15 RX ADMIN — CELECOXIB 200 MILLIGRAM(S): 50 CAPSULE ORAL at 08:10

## 2025-05-15 RX ADMIN — Medication 1000 MILLIGRAM(S): at 14:45

## 2025-05-15 RX ADMIN — OXYCODONE HYDROCHLORIDE 5 MILLIGRAM(S): 30 TABLET ORAL at 12:10

## 2025-05-15 RX ADMIN — Medication 500 MILLIGRAM(S): at 08:10

## 2025-05-15 RX ADMIN — Medication 1000 MILLIGRAM(S): at 05:40

## 2025-05-15 RX ADMIN — Medication 1000 MILLIGRAM(S): at 13:04

## 2025-05-15 NOTE — DISCHARGE NOTE NURSING/CASE MANAGEMENT/SOCIAL WORK - FINANCIAL ASSISTANCE
[facility] provides services at a reduced cost to those who are determined to be eligible through [facility]’s financial assistance program. Information regarding [facility]’s financial assistance program can be found by going to [email or web address] or by calling [phone].

## 2025-05-15 NOTE — SOCIAL WORK PROGRESS NOTE - NSSWPROGRESSNOTE_GEN_ALL_CORE
SW awaiting determination for AR at [facility]. Patient had been accepted to [facility] as backup [facility] can offer bed as backup  pending author if [facility] is not approved. DC packet on unit sw awaiting determination from insurance dc packet on unit

## 2025-05-15 NOTE — DISCHARGE NOTE NURSING/CASE MANAGEMENT/SOCIAL WORK - NSDCPEFALRISK_GEN_ALL_CORE
For information on Fall & Injury Prevention, visit: [email or web address] OR  [email or web address] OR  https://www.cdc.gov/steadi/patient.html

## 2025-05-15 NOTE — DISCHARGE NOTE NURSING/CASE MANAGEMENT/SOCIAL WORK - PATIENT PORTAL LINK FT
You can access the FollowMyHealth Patient Portal offered by [facility] by registering at the following website: [email or web address]. By joining [facility]’s FollowMyHealth portal, you will also be able to view your health information using other applications (apps) compatible with our system. No

## 2025-05-16 PROCEDURE — 99232 SBSQ HOSP IP/OBS MODERATE 35: CPT

## 2025-05-16 RX ADMIN — Medication 1000 MILLIGRAM(S): at 15:14

## 2025-05-16 RX ADMIN — POLYETHYLENE GLYCOL 3350 17 GRAM(S): 17 POWDER, FOR SOLUTION ORAL at 21:08

## 2025-05-16 RX ADMIN — Medication 100 MILLIGRAM(S): at 21:05

## 2025-05-16 RX ADMIN — CELECOXIB 200 MILLIGRAM(S): 50 CAPSULE ORAL at 21:06

## 2025-05-16 RX ADMIN — Medication 1000 MILLIGRAM(S): at 05:24

## 2025-05-16 RX ADMIN — ATORVASTATIN CALCIUM 20 MILLIGRAM(S): 80 TABLET, FILM COATED ORAL at 21:06

## 2025-05-16 RX ADMIN — Medication 81 MILLIGRAM(S): at 17:35

## 2025-05-16 RX ADMIN — Medication 0.2 MILLIGRAM(S): at 12:30

## 2025-05-16 RX ADMIN — GABAPENTIN 300 MILLIGRAM(S): 400 CAPSULE ORAL at 17:35

## 2025-05-16 RX ADMIN — CELECOXIB 200 MILLIGRAM(S): 50 CAPSULE ORAL at 08:48

## 2025-05-16 RX ADMIN — Medication 0.2 MILLIGRAM(S): at 13:00

## 2025-05-16 RX ADMIN — QUETIAPINE FUMARATE 50 MILLIGRAM(S): 25 TABLET ORAL at 21:05

## 2025-05-16 RX ADMIN — GABAPENTIN 300 MILLIGRAM(S): 400 CAPSULE ORAL at 05:22

## 2025-05-16 RX ADMIN — Medication 40 MILLIGRAM(S): at 05:21

## 2025-05-16 RX ADMIN — Medication 1000 MILLIGRAM(S): at 21:18

## 2025-05-16 RX ADMIN — OXYCODONE HYDROCHLORIDE 10 MILLIGRAM(S): 30 TABLET ORAL at 10:22

## 2025-05-16 RX ADMIN — QUETIAPINE FUMARATE 200 MILLIGRAM(S): 25 TABLET ORAL at 21:13

## 2025-05-16 RX ADMIN — Medication 1000 MILLIGRAM(S): at 21:07

## 2025-05-16 RX ADMIN — Medication 500 MILLIGRAM(S): at 21:06

## 2025-05-16 RX ADMIN — Medication 2 TABLET(S): at 21:07

## 2025-05-16 RX ADMIN — Medication 81 MILLIGRAM(S): at 05:22

## 2025-05-16 RX ADMIN — OXYCODONE HYDROCHLORIDE 5 MILLIGRAM(S): 30 TABLET ORAL at 21:12

## 2025-05-16 RX ADMIN — OXYCODONE HYDROCHLORIDE 5 MILLIGRAM(S): 30 TABLET ORAL at 22:10

## 2025-05-16 RX ADMIN — Medication 1000 MILLIGRAM(S): at 05:22

## 2025-05-16 RX ADMIN — CELECOXIB 200 MILLIGRAM(S): 50 CAPSULE ORAL at 08:54

## 2025-05-16 RX ADMIN — Medication 500 MILLIGRAM(S): at 12:30

## 2025-05-16 RX ADMIN — CELECOXIB 200 MILLIGRAM(S): 50 CAPSULE ORAL at 21:18

## 2025-05-16 NOTE — SOCIAL WORK PROGRESS NOTE - NSSWPROGRESSNOTE_GEN_ALL_CORE
SW received message from insurance that patient has been denied acute rehab at [facility].  They offered a peer to peer  however, janet TORRES did not feel he could appeal decision based on her medical record and PT notes.  ELI spoke to [facility] and they are willing to take patient pending insurance auth.  [facility] opened case and ELI faxed clinicals to insurance.   ELI spoke to patient who was upset that she was denied acute and was unsure if she wanted to go to [facility].  She stated she could not decide and asked SW to call her sister.   ELI spoke to sister Surya as well as daughter Dixie and both were in agreement with [facility].  SW to follow for needs and support.

## 2025-05-17 LAB
ANION GAP SERPL CALC-SCNC: 10 MMOL/L — SIGNIFICANT CHANGE UP (ref 5–17)
BUN SERPL-MCNC: 25 MG/DL — HIGH (ref 7–23)
CALCIUM SERPL-MCNC: 9.1 MG/DL — SIGNIFICANT CHANGE UP (ref 8.4–10.5)
CHLORIDE SERPL-SCNC: 99 MMOL/L — SIGNIFICANT CHANGE UP (ref 96–108)
CO2 SERPL-SCNC: 29 MMOL/L — SIGNIFICANT CHANGE UP (ref 22–31)
CREAT SERPL-MCNC: 0.89 MG/DL — SIGNIFICANT CHANGE UP (ref 0.5–1.3)
EGFR: 66 ML/MIN/1.73M2 — SIGNIFICANT CHANGE UP
EGFR: 66 ML/MIN/1.73M2 — SIGNIFICANT CHANGE UP
GLUCOSE SERPL-MCNC: 102 MG/DL — HIGH (ref 70–99)
HCT VFR BLD CALC: 29.5 % — LOW (ref 34.5–45)
HGB BLD-MCNC: 9.8 G/DL — LOW (ref 11.5–15.5)
MCHC RBC-ENTMCNC: 30.5 PG — SIGNIFICANT CHANGE UP (ref 27–34)
MCHC RBC-ENTMCNC: 33.2 G/DL — SIGNIFICANT CHANGE UP (ref 32–36)
MCV RBC AUTO: 91.9 FL — SIGNIFICANT CHANGE UP (ref 80–100)
NRBC BLD AUTO-RTO: 0 /100 WBCS — SIGNIFICANT CHANGE UP (ref 0–0)
PLATELET # BLD AUTO: 282 K/UL — SIGNIFICANT CHANGE UP (ref 150–400)
POTASSIUM SERPL-MCNC: 4.3 MMOL/L — SIGNIFICANT CHANGE UP (ref 3.5–5.3)
POTASSIUM SERPL-SCNC: 4.3 MMOL/L — SIGNIFICANT CHANGE UP (ref 3.5–5.3)
RBC # BLD: 3.21 M/UL — LOW (ref 3.8–5.2)
RBC # FLD: 13.5 % — SIGNIFICANT CHANGE UP (ref 10.3–14.5)
SODIUM SERPL-SCNC: 138 MMOL/L — SIGNIFICANT CHANGE UP (ref 135–145)
WBC # BLD: 8.56 K/UL — SIGNIFICANT CHANGE UP (ref 3.8–10.5)
WBC # FLD AUTO: 8.56 K/UL — SIGNIFICANT CHANGE UP (ref 3.8–10.5)

## 2025-05-17 PROCEDURE — 99232 SBSQ HOSP IP/OBS MODERATE 35: CPT

## 2025-05-17 RX ORDER — BISACODYL 5 MG
5 TABLET, DELAYED RELEASE (ENTERIC COATED) ORAL EVERY 12 HOURS
Refills: 0 | Status: DISCONTINUED | OUTPATIENT
Start: 2025-05-17 | End: 2025-05-19

## 2025-05-17 RX ORDER — BISACODYL 5 MG
1 TABLET, DELAYED RELEASE (ENTERIC COATED) ORAL
Qty: 0 | Refills: 0 | DISCHARGE
Start: 2025-05-17

## 2025-05-17 RX ADMIN — CELECOXIB 200 MILLIGRAM(S): 50 CAPSULE ORAL at 21:26

## 2025-05-17 RX ADMIN — Medication 500 MILLIGRAM(S): at 21:25

## 2025-05-17 RX ADMIN — Medication 40 MILLIGRAM(S): at 06:10

## 2025-05-17 RX ADMIN — OXYCODONE HYDROCHLORIDE 5 MILLIGRAM(S): 30 TABLET ORAL at 21:10

## 2025-05-17 RX ADMIN — GABAPENTIN 300 MILLIGRAM(S): 400 CAPSULE ORAL at 17:32

## 2025-05-17 RX ADMIN — MAGNESIUM HYDROXIDE 30 MILLILITER(S): 400 SUSPENSION ORAL at 17:32

## 2025-05-17 RX ADMIN — CELECOXIB 200 MILLIGRAM(S): 50 CAPSULE ORAL at 09:34

## 2025-05-17 RX ADMIN — Medication 500 MILLIGRAM(S): at 11:59

## 2025-05-17 RX ADMIN — CELECOXIB 200 MILLIGRAM(S): 50 CAPSULE ORAL at 09:32

## 2025-05-17 RX ADMIN — Medication 2 TABLET(S): at 21:26

## 2025-05-17 RX ADMIN — CELECOXIB 200 MILLIGRAM(S): 50 CAPSULE ORAL at 21:40

## 2025-05-17 RX ADMIN — ATORVASTATIN CALCIUM 20 MILLIGRAM(S): 80 TABLET, FILM COATED ORAL at 21:25

## 2025-05-17 RX ADMIN — GABAPENTIN 300 MILLIGRAM(S): 400 CAPSULE ORAL at 06:10

## 2025-05-17 RX ADMIN — QUETIAPINE FUMARATE 50 MILLIGRAM(S): 25 TABLET ORAL at 21:26

## 2025-05-17 RX ADMIN — POLYETHYLENE GLYCOL 3350 17 GRAM(S): 17 POWDER, FOR SOLUTION ORAL at 21:43

## 2025-05-17 RX ADMIN — Medication 1000 MILLIGRAM(S): at 14:30

## 2025-05-17 RX ADMIN — OXYCODONE HYDROCHLORIDE 5 MILLIGRAM(S): 30 TABLET ORAL at 20:05

## 2025-05-17 RX ADMIN — Medication 81 MILLIGRAM(S): at 06:09

## 2025-05-17 RX ADMIN — Medication 1000 MILLIGRAM(S): at 14:09

## 2025-05-17 RX ADMIN — OXYCODONE HYDROCHLORIDE 5 MILLIGRAM(S): 30 TABLET ORAL at 06:12

## 2025-05-17 RX ADMIN — Medication 100 MILLIGRAM(S): at 21:26

## 2025-05-17 RX ADMIN — Medication 1000 MILLIGRAM(S): at 21:40

## 2025-05-17 RX ADMIN — Medication 81 MILLIGRAM(S): at 17:32

## 2025-05-17 RX ADMIN — QUETIAPINE FUMARATE 200 MILLIGRAM(S): 25 TABLET ORAL at 21:25

## 2025-05-17 RX ADMIN — OXYCODONE HYDROCHLORIDE 5 MILLIGRAM(S): 30 TABLET ORAL at 07:10

## 2025-05-17 RX ADMIN — Medication 1000 MILLIGRAM(S): at 06:15

## 2025-05-17 RX ADMIN — Medication 5 MILLIGRAM(S): at 11:59

## 2025-05-17 RX ADMIN — Medication 1000 MILLIGRAM(S): at 21:26

## 2025-05-17 RX ADMIN — Medication 1000 MILLIGRAM(S): at 06:09

## 2025-05-17 NOTE — SOCIAL WORK PROGRESS NOTE - NSSWPROGRESSNOTE_GEN_ALL_CORE
No weekend DC. Awaiting auth from [facility] which is closed on weekends. Pending auth for [facility] is [id number]. Sw to F/U on Monday 5/19.

## 2025-05-18 LAB
ANION GAP SERPL CALC-SCNC: 9 MMOL/L — SIGNIFICANT CHANGE UP (ref 5–17)
BUN SERPL-MCNC: 31 MG/DL — HIGH (ref 7–23)
CALCIUM SERPL-MCNC: 8.8 MG/DL — SIGNIFICANT CHANGE UP (ref 8.4–10.5)
CHLORIDE SERPL-SCNC: 102 MMOL/L — SIGNIFICANT CHANGE UP (ref 96–108)
CO2 SERPL-SCNC: 29 MMOL/L — SIGNIFICANT CHANGE UP (ref 22–31)
CREAT SERPL-MCNC: 0.85 MG/DL — SIGNIFICANT CHANGE UP (ref 0.5–1.3)
EGFR: 70 ML/MIN/1.73M2 — SIGNIFICANT CHANGE UP
EGFR: 70 ML/MIN/1.73M2 — SIGNIFICANT CHANGE UP
GLUCOSE SERPL-MCNC: 97 MG/DL — SIGNIFICANT CHANGE UP (ref 70–99)
HCT VFR BLD CALC: 27.7 % — LOW (ref 34.5–45)
HGB BLD-MCNC: 9.2 G/DL — LOW (ref 11.5–15.5)
MCHC RBC-ENTMCNC: 30.6 PG — SIGNIFICANT CHANGE UP (ref 27–34)
MCHC RBC-ENTMCNC: 33.2 G/DL — SIGNIFICANT CHANGE UP (ref 32–36)
MCV RBC AUTO: 92 FL — SIGNIFICANT CHANGE UP (ref 80–100)
NRBC BLD AUTO-RTO: 0 /100 WBCS — SIGNIFICANT CHANGE UP (ref 0–0)
PLATELET # BLD AUTO: 277 K/UL — SIGNIFICANT CHANGE UP (ref 150–400)
POTASSIUM SERPL-MCNC: 4.3 MMOL/L — SIGNIFICANT CHANGE UP (ref 3.5–5.3)
POTASSIUM SERPL-SCNC: 4.3 MMOL/L — SIGNIFICANT CHANGE UP (ref 3.5–5.3)
RBC # BLD: 3.01 M/UL — LOW (ref 3.8–5.2)
RBC # FLD: 13.5 % — SIGNIFICANT CHANGE UP (ref 10.3–14.5)
SODIUM SERPL-SCNC: 140 MMOL/L — SIGNIFICANT CHANGE UP (ref 135–145)
WBC # BLD: 6.78 K/UL — SIGNIFICANT CHANGE UP (ref 3.8–10.5)
WBC # FLD AUTO: 6.78 K/UL — SIGNIFICANT CHANGE UP (ref 3.8–10.5)

## 2025-05-18 PROCEDURE — 99232 SBSQ HOSP IP/OBS MODERATE 35: CPT

## 2025-05-18 RX ORDER — BISACODYL 5 MG
10 TABLET, DELAYED RELEASE (ENTERIC COATED) ORAL ONCE
Refills: 0 | Status: COMPLETED | OUTPATIENT
Start: 2025-05-18 | End: 2025-05-18

## 2025-05-18 RX ADMIN — Medication 500 MILLIGRAM(S): at 21:32

## 2025-05-18 RX ADMIN — QUETIAPINE FUMARATE 50 MILLIGRAM(S): 25 TABLET ORAL at 21:32

## 2025-05-18 RX ADMIN — CELECOXIB 200 MILLIGRAM(S): 50 CAPSULE ORAL at 10:05

## 2025-05-18 RX ADMIN — Medication 40 MILLIGRAM(S): at 06:32

## 2025-05-18 RX ADMIN — CELECOXIB 200 MILLIGRAM(S): 50 CAPSULE ORAL at 21:43

## 2025-05-18 RX ADMIN — OXYCODONE HYDROCHLORIDE 5 MILLIGRAM(S): 30 TABLET ORAL at 14:52

## 2025-05-18 RX ADMIN — Medication 10 MILLIGRAM(S): at 22:29

## 2025-05-18 RX ADMIN — CELECOXIB 200 MILLIGRAM(S): 50 CAPSULE ORAL at 21:32

## 2025-05-18 RX ADMIN — Medication 1000 MILLIGRAM(S): at 06:33

## 2025-05-18 RX ADMIN — Medication 500 MILLIGRAM(S): at 11:37

## 2025-05-18 RX ADMIN — MAGNESIUM HYDROXIDE 30 MILLILITER(S): 400 SUSPENSION ORAL at 13:52

## 2025-05-18 RX ADMIN — Medication 1000 MILLIGRAM(S): at 06:36

## 2025-05-18 RX ADMIN — GABAPENTIN 300 MILLIGRAM(S): 400 CAPSULE ORAL at 18:14

## 2025-05-18 RX ADMIN — Medication 1000 MILLIGRAM(S): at 13:52

## 2025-05-18 RX ADMIN — Medication 100 MILLIGRAM(S): at 21:33

## 2025-05-18 RX ADMIN — Medication 5 MILLIGRAM(S): at 10:05

## 2025-05-18 RX ADMIN — OXYCODONE HYDROCHLORIDE 5 MILLIGRAM(S): 30 TABLET ORAL at 15:23

## 2025-05-18 RX ADMIN — QUETIAPINE FUMARATE 200 MILLIGRAM(S): 25 TABLET ORAL at 21:32

## 2025-05-18 RX ADMIN — CELECOXIB 200 MILLIGRAM(S): 50 CAPSULE ORAL at 10:09

## 2025-05-18 RX ADMIN — CLONAZEPAM 0.5 MILLIGRAM(S): 0.5 TABLET ORAL at 01:15

## 2025-05-18 RX ADMIN — Medication 1000 MILLIGRAM(S): at 21:32

## 2025-05-18 RX ADMIN — Medication 81 MILLIGRAM(S): at 06:32

## 2025-05-18 RX ADMIN — Medication 1000 MILLIGRAM(S): at 21:43

## 2025-05-18 RX ADMIN — Medication 81 MILLIGRAM(S): at 18:14

## 2025-05-18 RX ADMIN — GABAPENTIN 300 MILLIGRAM(S): 400 CAPSULE ORAL at 06:32

## 2025-05-18 RX ADMIN — Medication 1000 MILLIGRAM(S): at 13:55

## 2025-05-18 RX ADMIN — ATORVASTATIN CALCIUM 20 MILLIGRAM(S): 80 TABLET, FILM COATED ORAL at 21:32

## 2025-05-19 VITALS
HEART RATE: 76 BPM | TEMPERATURE: 98 F | OXYGEN SATURATION: 99 % | RESPIRATION RATE: 16 BRPM | SYSTOLIC BLOOD PRESSURE: 133 MMHG | DIASTOLIC BLOOD PRESSURE: 79 MMHG

## 2025-05-19 LAB
ANION GAP SERPL CALC-SCNC: 6 MMOL/L — SIGNIFICANT CHANGE UP (ref 5–17)
BUN SERPL-MCNC: 24 MG/DL — HIGH (ref 7–23)
CALCIUM SERPL-MCNC: 8.7 MG/DL — SIGNIFICANT CHANGE UP (ref 8.4–10.5)
CHLORIDE SERPL-SCNC: 104 MMOL/L — SIGNIFICANT CHANGE UP (ref 96–108)
CO2 SERPL-SCNC: 29 MMOL/L — SIGNIFICANT CHANGE UP (ref 22–31)
CREAT SERPL-MCNC: 1 MG/DL — SIGNIFICANT CHANGE UP (ref 0.5–1.3)
EGFR: 58 ML/MIN/1.73M2 — LOW
EGFR: 58 ML/MIN/1.73M2 — LOW
GLUCOSE SERPL-MCNC: 102 MG/DL — HIGH (ref 70–99)
HCT VFR BLD CALC: 27.6 % — LOW (ref 34.5–45)
HGB BLD-MCNC: 8.9 G/DL — LOW (ref 11.5–15.5)
MCHC RBC-ENTMCNC: 29.6 PG — SIGNIFICANT CHANGE UP (ref 27–34)
MCHC RBC-ENTMCNC: 32.2 G/DL — SIGNIFICANT CHANGE UP (ref 32–36)
MCV RBC AUTO: 91.7 FL — SIGNIFICANT CHANGE UP (ref 80–100)
NRBC BLD AUTO-RTO: 0 /100 WBCS — SIGNIFICANT CHANGE UP (ref 0–0)
PLATELET # BLD AUTO: 288 K/UL — SIGNIFICANT CHANGE UP (ref 150–400)
POTASSIUM SERPL-MCNC: 4.6 MMOL/L — SIGNIFICANT CHANGE UP (ref 3.5–5.3)
POTASSIUM SERPL-SCNC: 4.6 MMOL/L — SIGNIFICANT CHANGE UP (ref 3.5–5.3)
RBC # BLD: 3.01 M/UL — LOW (ref 3.8–5.2)
RBC # FLD: 13.5 % — SIGNIFICANT CHANGE UP (ref 10.3–14.5)
SODIUM SERPL-SCNC: 139 MMOL/L — SIGNIFICANT CHANGE UP (ref 135–145)
WBC # BLD: 7.16 K/UL — SIGNIFICANT CHANGE UP (ref 3.8–10.5)
WBC # FLD AUTO: 7.16 K/UL — SIGNIFICANT CHANGE UP (ref 3.8–10.5)

## 2025-05-19 PROCEDURE — 73560 X-RAY EXAM OF KNEE 1 OR 2: CPT

## 2025-05-19 PROCEDURE — S2900: CPT

## 2025-05-19 PROCEDURE — 85027 COMPLETE CBC AUTOMATED: CPT

## 2025-05-19 PROCEDURE — 97535 SELF CARE MNGMENT TRAINING: CPT

## 2025-05-19 PROCEDURE — 99232 SBSQ HOSP IP/OBS MODERATE 35: CPT

## 2025-05-19 PROCEDURE — 36415 COLL VENOUS BLD VENIPUNCTURE: CPT

## 2025-05-19 PROCEDURE — 97161 PT EVAL LOW COMPLEX 20 MIN: CPT

## 2025-05-19 PROCEDURE — 97165 OT EVAL LOW COMPLEX 30 MIN: CPT

## 2025-05-19 PROCEDURE — C1776: CPT

## 2025-05-19 PROCEDURE — 80048 BASIC METABOLIC PNL TOTAL CA: CPT

## 2025-05-19 PROCEDURE — 97116 GAIT TRAINING THERAPY: CPT

## 2025-05-19 PROCEDURE — C1713: CPT

## 2025-05-19 PROCEDURE — 97530 THERAPEUTIC ACTIVITIES: CPT

## 2025-05-19 PROCEDURE — C1889: CPT

## 2025-05-19 PROCEDURE — 97110 THERAPEUTIC EXERCISES: CPT

## 2025-05-19 RX ORDER — NALOXONE HYDROCHLORIDE 0.4 MG/ML
1 INJECTION, SOLUTION INTRAMUSCULAR; INTRAVENOUS; SUBCUTANEOUS
Qty: 0 | Refills: 0 | DISCHARGE

## 2025-05-19 RX ADMIN — OXYCODONE HYDROCHLORIDE 5 MILLIGRAM(S): 30 TABLET ORAL at 12:54

## 2025-05-19 RX ADMIN — OXYCODONE HYDROCHLORIDE 5 MILLIGRAM(S): 30 TABLET ORAL at 12:03

## 2025-05-19 RX ADMIN — Medication 1000 MILLIGRAM(S): at 06:45

## 2025-05-19 RX ADMIN — CELECOXIB 200 MILLIGRAM(S): 50 CAPSULE ORAL at 09:40

## 2025-05-19 RX ADMIN — Medication 81 MILLIGRAM(S): at 06:02

## 2025-05-19 RX ADMIN — Medication 1000 MILLIGRAM(S): at 12:04

## 2025-05-19 RX ADMIN — Medication 40 MILLIGRAM(S): at 06:03

## 2025-05-19 RX ADMIN — Medication 1000 MILLIGRAM(S): at 12:54

## 2025-05-19 RX ADMIN — GABAPENTIN 300 MILLIGRAM(S): 400 CAPSULE ORAL at 06:02

## 2025-05-19 RX ADMIN — Medication 500 MILLIGRAM(S): at 11:41

## 2025-05-19 RX ADMIN — Medication 1000 MILLIGRAM(S): at 06:02

## 2025-05-19 RX ADMIN — CELECOXIB 200 MILLIGRAM(S): 50 CAPSULE ORAL at 09:05

## 2025-05-19 NOTE — PROGRESS NOTE ADULT - ASSESSMENT
79 yo female, pmh of schizophrenia, hld, OA, overactive bladder, chronic constipation, presenting for right knee replacement.     right knee replacement  - pain control - standing tylenol 1000 q8h,  - celebrex 200 bid  oxycodone 5/10 prn for mod/severe pain, dilaudid iv for breakthrough pain  - bowel regimen   -pt/ot  - aspirin 81 bid for dvt ppx  - no medical contraindication to DC to rehab, pending placement, insurance auth    schizophrenia  - continue home seroquel, trazodone, depakote, paliperidone  - on klonopin at bedtime, advised against concomitant use of opiates with benzodiazepines to avoid oversedation, patient verbalized understanding    hld - on lipitor    
81 yo female, pmh of schizophrenia, hld, OA, overactive bladder, chronic constipation, presenting for right knee replacement.     right knee replacement  - pain control - standing tylenol 1000 q8h,  - celebrex 200 bid  oxycodone 5/10 prn for mod/severe pain, dilaudid iv for breakthrough pain  - bowel regimen - added dulcolax prn, give dose now  -pt/ot  - aspirin 81 bid for dvt ppx  - no medical contraindication to DC to rehab, pending placement, insurance auth    schizophrenia  - continue home seroquel, trazodone, depakote, paliperidone  - on klonopin at bedtime, advised against concomitant use of opiates with benzodiazepines to avoid oversedation, patient verbalized understanding    hld - on lipitor    
81 yo female, pmh of schizophrenia, hld, OA, overactive bladder, chronic constipation, presenting for right knee replacement.     right knee replacement  - pain control - standing tylenol 1000 q8h,  - celebrex 200 bid  oxycodone 5/10 prn for mod/severe pain, dilaudid iv for breakthrough pain  - bowel regimen - giving dulcolax again today  -pt/ot  - aspirin 81 bid for dvt ppx  - no medical contraindication to DC to rehab, pending placement, insurance auth    schizophrenia  - continue home seroquel, trazodone, depakote, paliperidone  - on klonopin at bedtime, avoid concomitant use of opiates with benzodiazepines to avoid oversedation    hld - on lipitor    
81 yo female, pmh of schizophrenia, hld, OA, overactive bladder, chronic constipation, presenting for right knee replacement.     right knee replacement  - pain control - standing tylenol 1000 q8h,  - celebrex 200 bid  oxycodone 5/10 prn for mod/severe pain, dilaudid iv for breakthrough pain  - bowel regimen   -pt/ot  - aspirin 81 bid for dvt ppx  - ortho to reassess bandage  - no medical contraindication to DC, requesting [facility], pending placement    schizophrenia  - continue home seroquel, trazodone, depakote, paliperidone  - on klonopin at bedtime, advised against concomitant use of opiates with benzodiazepines to avoid oversedation, patient verbalized understanding    hld - on lipitor    
81 yo female, pmh of schizophrenia, hld, OA, overactive bladder, chronic constipation, presenting for right knee replacement.     right knee replacement  - pain control - standing tylenol 1000 q8h,  - celebrex 200 bid  oxycodone 5/10 prn for mod/severe pain, dilaudid iv for breakthrough pain  - bowel regimen - had 2 large BM  -pt/ot  - aspirin 81 bid for dvt ppx  - no medical contraindication to DC to rehab, pending placement, insurance auth    schizophrenia  - continue home seroquel, trazodone, depakote, paliperidone  - on klonopin at bedtime, avoid concomitant use of opiates with benzodiazepines to avoid oversedation    hld - on lipitor    
Neurovascular status in tact
81 yo female, pmh of schizophrenia, hld, OA, overactive bladder, chronic constipation, presenting for right knee replacement.     right knee replacement  - pain control - standing tylenol 1000 q8h,  - celebrex 200 bid  oxycodone 5/10 prn for mod/severe pain, dilaudid iv for breakthrough pain  - bowel regimen   -pt/ot  - aspirin 81 bid for dvt ppx  - no medical contraindication to DC to rehab, pending placement, insurance auth    schizophrenia  - continue home seroquel, trazodone, depakote, paliperidone  - on klonopin at bedtime, advised against concomitant use of opiates with benzodiazepines to avoid oversedation, patient verbalized understanding    hld - on lipitor

## 2025-05-19 NOTE — SOCIAL WORK PROGRESS NOTE - NSSWPROGRESSNOTE_GEN_ALL_CORE
ELI spoke with Ed[facility] who reports [facility]. Auth # [id number] levuna 2 5/17-5/21.  has not been assigned once pt admits to [facility], they will notify insurance and  will be assigned. ELI spoke with pt and pt's daughter Dixie regarding dc to [facility] vor 12 noon via dhiraj. Md and RN aware. DC packet on unit. ELI remains available.

## 2025-05-19 NOTE — PROGRESS NOTE ADULT - REASON FOR ADMISSION
right total knee replacement

## 2025-05-19 NOTE — PROGRESS NOTE ADULT - SUBJECTIVE AND OBJECTIVE BOX
POD  #:  4  S/P  Right TKR                       SUBJECTIVE: Patient seen and examined. Ambulated with PT. Tolerating diet. Voiding. She is laying comfortably in bed without complaints. She does note a worsening pain when ambulating but she has minimal pain when laying in bed.   Denies fevers, chills, chest pain, calf pain, SOB  Reported Pain Score = 3/10    OBJECTIVE:     Vital Signs Last 24 Hrs  T(C): 36.8 (17 May 2025 07:57), Max: 36.9 (16 May 2025 15:28)  T(F): 98.3 (17 May 2025 07:57), Max: 98.4 (16 May 2025 15:28)  HR: 74 (17 May 2025 07:57) (74 - 80)  BP: 96/62 (17 May 2025 07:57) (90/57 - 108/70)  BP(mean): --  RR: 17 (17 May 2025 07:57) (16 - 17)  SpO2: 95% (17 May 2025 07:57) (95% - 97%)    Parameters below as of 17 May 2025 07:57  Patient On (Oxygen Delivery Method): room air        Right Knee:          Dressing: Mepilex clean/dry/intact    Bilateral LEs:         Sensation:  intact to light touch          Motor exam:  5/5 dorsiflexion/plantarflexion/EHL          2+ DP pulses          calf supple, NT         SCDs in place    LABS:                MEDICATIONS:  Anticoagulation:  aspirin enteric coated 81 milliGRAM(s) Oral every 12 hours      Pain medications:   acetaminophen     Tablet .. 1000 milliGRAM(s) Oral every 8 hours  celecoxib 200 milliGRAM(s) Oral every 12 hours  clonazePAM  Tablet 0.5 milliGRAM(s) Oral at bedtime PRN  divalproex  milliGRAM(s) Oral daily  divalproex  milliGRAM(s) Oral at bedtime  gabapentin 300 milliGRAM(s) Oral two times a day  HYDROmorphone  Injectable 0.2 milliGRAM(s) IV Push every 10 minutes PRN  HYDROmorphone  Injectable 0.5 milliGRAM(s) IV Push every 10 minutes PRN  HYDROmorphone  Injectable 0.5 milliGRAM(s) IV Push every 3 hours PRN  ondansetron Injectable 4 milliGRAM(s) IV Push once PRN  ondansetron Injectable 4 milliGRAM(s) IV Push every 6 hours PRN  oxyCODONE    IR 5 milliGRAM(s) Oral every 3 hours PRN  oxyCODONE    IR 10 milliGRAM(s) Oral every 3 hours PRN  paliperidone ER. 6 milliGRAM(s) Oral daily  QUEtiapine 200 milliGRAM(s) Oral at bedtime  QUEtiapine 50 milliGRAM(s) Oral at bedtime  traZODone 100 milliGRAM(s) Oral at bedtime        A/P : Patient stable  s/p Right TKR POD # 4  -    Pain control Celebrex, Oxycodone, Dilaudid, Acetaminophen  -    DVT ppx: Aspirin 81mg q 12 h  -    Weight bearing status: WBAT   -    Physical Therapy  -    Occupational Therapy  -    Discharge plan: Rehab when cleared by medicine and PT and pending insurance auth and bed availability.      
Patient is a 80y old  Female who presents with a chief complaint of Right Total Knee Replacement (14 May 2025 08:46)      INTERVAL HPI/OVERNIGHT EVENTS:  Patient seen and examined in am, pain fair controlled, no dizziness, no nausea    ROS reviewed and is otherwise negative        Vital Signs Last 24 Hrs  T(C): 36.6 (14 May 2025 08:43), Max: 36.7 (13 May 2025 16:10)  T(F): 97.9 (14 May 2025 08:43), Max: 98 (13 May 2025 16:10)  HR: 72 (14 May 2025 08:43) (68 - 82)  BP: 114/70 (14 May 2025 08:43) (94/74 - 153/72)  BP(mean): --  RR: 17 (14 May 2025 08:43) (12 - 17)  SpO2: 98% (14 May 2025 08:43) (96% - 100%)    Parameters below as of 14 May 2025 08:43  Patient On (Oxygen Delivery Method): room air        PHYSICAL EXAM:  GENERAL: NAD, well-groomed, elderly female  HEAD:  Atraumatic, Normocephalic  EYES: EOMI, PERRLA  ENMT: Moist mucous membranes, No lesions   NECK: Supple, No JVD   NERVOUS SYSTEM:  Alert & Oriented X3, Good concentration; All 4 extremities mobile   CHEST/LUNG: Clear to auscultation bilaterally; No rales, rhonchi, wheezing, or rubs  HEART: Regular rate and rhythm; No murmurs, rubs, or gallops  ABDOMEN: Soft, Nontender, Nondistended; Bowel sounds present  EXTREMITIES:  + Pulses, No clubbing, right knee bandage peeling on lateral side with steri strip exposed otherwise clean  SKIN: No rashes or lesions    MEDICATIONS  (STANDING):  acetaminophen     Tablet .. 1000 milliGRAM(s) Oral every 8 hours  aspirin enteric coated 81 milliGRAM(s) Oral every 12 hours  atorvastatin 20 milliGRAM(s) Oral at bedtime  celecoxib 200 milliGRAM(s) Oral every 12 hours  divalproex  milliGRAM(s) Oral daily  divalproex  milliGRAM(s) Oral at bedtime  gabapentin 300 milliGRAM(s) Oral two times a day  lactated ringers. 1000 milliLiter(s) (100 mL/Hr) IV Continuous <Continuous>  lactated ringers. 1000 milliLiter(s) (75 mL/Hr) IV Continuous <Continuous>  paliperidone ER. 6 milliGRAM(s) Oral daily  pantoprazole    Tablet 40 milliGRAM(s) Oral before breakfast  polyethylene glycol 3350 17 Gram(s) Oral at bedtime  QUEtiapine 200 milliGRAM(s) Oral at bedtime  QUEtiapine 50 milliGRAM(s) Oral at bedtime  senna 2 Tablet(s) Oral at bedtime  traZODone 100 milliGRAM(s) Oral at bedtime    MEDICATIONS  (PRN):  artificial  tears Solution 1 Drop(s) Both EYES every 3 hours PRN Dry Eyes  clonazePAM  Tablet 0.5 milliGRAM(s) Oral at bedtime PRN anxeity  HYDROmorphone  Injectable 0.2 milliGRAM(s) IV Push every 10 minutes PRN Moderate Pain (4 - 6)  HYDROmorphone  Injectable 0.5 milliGRAM(s) IV Push every 10 minutes PRN Severe Pain (7 - 10)  HYDROmorphone  Injectable 0.5 milliGRAM(s) IV Push every 3 hours PRN Breakthrough pain  magnesium hydroxide Suspension 30 milliLiter(s) Oral daily PRN Constipation  ondansetron Injectable 4 milliGRAM(s) IV Push once PRN Nausea and/or Vomiting  ondansetron Injectable 4 milliGRAM(s) IV Push every 6 hours PRN Nausea and/or Vomiting  oxyCODONE    IR 5 milliGRAM(s) Oral every 3 hours PRN Moderate Pain (4 - 6)  oxyCODONE    IR 10 milliGRAM(s) Oral every 3 hours PRN Severe Pain (7 - 10)      Allergies    No Known Allergies    Intolerances          LABS:                        10.4   14.20 )-----------( 260      ( 14 May 2025 06:00 )             31.8     14 May 2025 06:00    139    |  105    |  22     ----------------------------<  100    4.1     |  23     |  1.00     Ca    8.7        14 May 2025 06:00        Urinalysis Basic - ( 14 May 2025 06:00 )    Color: x / Appearance: x / SG: x / pH: x  Gluc: 100 mg/dL / Ketone: x  / Bili: x / Urobili: x   Blood: x / Protein: x / Nitrite: x   Leuk Esterase: x / RBC: x / WBC x   Sq Epi: x / Non Sq Epi: x / Bacteria: x      CAPILLARY BLOOD GLUCOSE          RADIOLOGY & ADDITIONAL TESTS:        Care Discussed with Consultants/Other Providers:    Advanced Directives: [ ] DNR  [ ] No feeding tube  [ ] MOLST in chart  [ ] MOLST completed today  [ ] Unknown  
BRITTANEY FAITH                                                               [id number]                                                       Allergies---No Known Allergies        Pt is a 80y year old Female s/p right TKR.   Pt. is A&O x 3, resting comfortably, with no complaints.   Pain is 3/10.   Tolerating the diet.   Denies chest pain / shortness of breath / dyspnea / nausea / vomiting / headaches or light headed ness.         Vital Signs Last 24 Hrs  T(F): 98.1 (05-18-25 @ 07:52), Max: 98.4 (05-17-25 @ 23:00)  HR: 66 (05-18-25 @ 07:52)  BP: 98/57 (05-18-25 @ 07:52)  RR: 17 (05-18-25 @ 07:52)  SpO2: 97% (05-18-25 @ 07:52)    I&O's Detail    17 May 2025 07:01  -  18 May 2025 07:00  --------------------------------------------------------  IN:  Total IN: 0 mL    OUT:    Voided (mL): 550 mL  Total OUT: 550 mL    Total NET: -550 mL        PE:   Right Lower Extremity:   Mepilex Dressing is C/D/I and fixated well, to the pt.   No redness, swelling, heat, discharge or other evidence of infection, superficial or deep.   Neurovascularly intact.   No gross evidence of motor or sensory deficit.   +2 DP/PT pulses.   EHL/FHL/TA intact.   Toes are pink and mobile.   Capillary refill < 2 seconds.   Negative calf tenderness.   PAS on.                              9.2    6.78  )--------------( 277                          05-18-25 @ 06:00               27.7         140   |  102  |  31  -----------------------------<  97                  05-18-25 @ 06:00  4.3    |  29    |  0.85        Ca    8.8              A:   Pt is a 80y year old Female S/P right total knee replacement, Post Op Day #5        Plan:    - Follow up with Medicine    - OOB with PT/OT   - Pain control    - Incentive spirometry   - Discharge Planning for BRITTANE[facility] on Monday   - DVT ppx = PAS +  aspirin enteric coated 81 milliGRAM(s) Oral every 12 hours                                                                                                                                                                             Lei SUN      
Discharge medication calendar:  ASA EC 81mg q12h x 4 weeks  Omeprazole 20mg QAM x 4 weeks  Celecoxib 200mg q12h x 2-3 weeks  APAP 1000mg q8h x 2-3 weeks  Narcotic PRN  Docusate 100mg TID while taking narcotic  Miralax, Senna, or Bisacodyl PRN for treatment of constipation  Narcan  
POD  #:  1  S/P  Right TKR                       SUBJECTIVE: Patient seen and examined. Ambulated with PT. Tolerating diet. Voiding. Patient tolerating pain medications well. She is sitting in a chair comfortably without complaints.   Denies fevers, chills, chest pain, calf pain, SOB  Reported Pain Score = 4/10    OBJECTIVE:     Vital Signs Last 24 Hrs  T(C): 36.6 (14 May 2025 08:43), Max: 36.7 (13 May 2025 16:10)  T(F): 97.9 (14 May 2025 08:43), Max: 98 (13 May 2025 16:10)  HR: 72 (14 May 2025 08:43) (68 - 82)  BP: 114/70 (14 May 2025 08:43) (94/74 - 153/72)  BP(mean): --  RR: 17 (14 May 2025 08:43) (12 - 18)  SpO2: 98% (14 May 2025 08:43) (96% - 100%)    Parameters below as of 14 May 2025 08:43  Patient On (Oxygen Delivery Method): room air        Right Knee:          Dressing: Mepilex clean/dry/intact    Bilateral LEs:         Sensation:  intact to light touch          Motor exam:  5/5 dorsiflexion/plantarflexion/EHL          2+ DP pulses          calf supple, NT         SCDs in place    LABS:                        10.4   14.20 )-----------( 260      ( 14 May 2025 06:00 )             31.8     05-14    139  |  105  |  22  ----------------------------<  100[H]  4.1   |  23  |  1.00    Ca    8.7      14 May 2025 06:00        Urinalysis Basic - ( 14 May 2025 06:00 )    Color: x / Appearance: x / SG: x / pH: x  Gluc: 100 mg/dL / Ketone: x  / Bili: x / Urobili: x   Blood: x / Protein: x / Nitrite: x   Leuk Esterase: x / RBC: x / WBC x   Sq Epi: x / Non Sq Epi: x / Bacteria: x        MEDICATIONS:  Anticoagulation:  aspirin enteric coated 81 milliGRAM(s) Oral every 12 hours      Pain medications:   acetaminophen     Tablet .. 1000 milliGRAM(s) Oral every 8 hours  celecoxib 200 milliGRAM(s) Oral every 12 hours  clonazePAM  Tablet 0.5 milliGRAM(s) Oral at bedtime PRN  divalproex  milliGRAM(s) Oral daily  divalproex  milliGRAM(s) Oral at bedtime  gabapentin 300 milliGRAM(s) Oral two times a day  HYDROmorphone  Injectable 0.2 milliGRAM(s) IV Push every 10 minutes PRN  HYDROmorphone  Injectable 0.5 milliGRAM(s) IV Push every 10 minutes PRN  HYDROmorphone  Injectable 0.5 milliGRAM(s) IV Push every 3 hours PRN  ondansetron Injectable 4 milliGRAM(s) IV Push once PRN  ondansetron Injectable 4 milliGRAM(s) IV Push every 6 hours PRN  oxyCODONE    IR 5 milliGRAM(s) Oral every 3 hours PRN  oxyCODONE    IR 10 milliGRAM(s) Oral every 3 hours PRN  paliperidone ER. 6 milliGRAM(s) Oral daily  QUEtiapine 200 milliGRAM(s) Oral at bedtime  QUEtiapine 50 milliGRAM(s) Oral at bedtime  traZODone 100 milliGRAM(s) Oral at bedtime        A/P : Patient stable  s/p Right TKR POD # 1  -    Pain control Celebrex, Oxycodone, Dilaudid, Acetaminophen  -    DVT ppx: Aspirin 81mg q 12 h  -    Weight bearing status: WBAT   -    Physical Therapy  -    Occupational Therapy  -    Discharge plan: Stable from ortho perspective. Patient would like rehab. Rehab when cleared by medicine and PT and pending insurance auth and bed availability.      
Patient is a 80y old  Female who presents with a chief complaint of Right Total Knee Replacement (14 May 2025 08:46)      INTERVAL HPI/OVERNIGHT EVENTS:  Patient seen and examined in am, complaining of paresthesias in right leg, described as pins and needles, affecting strength in leg, no loss of sensation    ROS reviewed and is otherwise negative        Vital Signs Last 24 Hrs  T(C): 36.9 (15 May 2025 07:56), Max: 37.1 (14 May 2025 23:30)  T(F): 98.4 (15 May 2025 07:56), Max: 98.7 (14 May 2025 23:30)  HR: 74 (15 May 2025 07:56) (74 - 85)  BP: 120/78 (15 May 2025 07:56) (103/63 - 120/78)  BP(mean): --  RR: 18 (15 May 2025 07:56) (16 - 18)  SpO2: 98% (15 May 2025 07:56) (97% - 98%)    Parameters below as of 15 May 2025 07:56  Patient On (Oxygen Delivery Method): room air        PHYSICAL EXAM:  GENERAL: NAD, well-groomed, elderly female  HEAD:  Atraumatic, Normocephalic  EYES: EOMI, PERRLA  ENMT: Moist mucous membranes, No lesions   NECK: Supple, No JVD   NERVOUS SYSTEM:  Alert & Oriented X3, Good concentration; All 4 extremities mobile   CHEST/LUNG: Clear to auscultation bilaterally; No rales, rhonchi, wheezing, or rubs  HEART: Regular rate and rhythm; No murmurs, rubs, or gallops  ABDOMEN: Soft, Nontender, Nondistended; Bowel sounds present  EXTREMITIES:  + Pulses, No clubbing, right knee bandage clean dry intact  SKIN: No rashes or lesions    MEDICATIONS  (STANDING):  acetaminophen     Tablet .. 1000 milliGRAM(s) Oral every 8 hours  aspirin enteric coated 81 milliGRAM(s) Oral every 12 hours  atorvastatin 20 milliGRAM(s) Oral at bedtime  celecoxib 200 milliGRAM(s) Oral every 12 hours  divalproex  milliGRAM(s) Oral daily  divalproex  milliGRAM(s) Oral at bedtime  gabapentin 300 milliGRAM(s) Oral two times a day  lactated ringers. 1000 milliLiter(s) (100 mL/Hr) IV Continuous <Continuous>  lactated ringers. 1000 milliLiter(s) (75 mL/Hr) IV Continuous <Continuous>  paliperidone ER. 6 milliGRAM(s) Oral daily  pantoprazole    Tablet 40 milliGRAM(s) Oral before breakfast  polyethylene glycol 3350 17 Gram(s) Oral at bedtime  QUEtiapine 200 milliGRAM(s) Oral at bedtime  QUEtiapine 50 milliGRAM(s) Oral at bedtime  senna 2 Tablet(s) Oral at bedtime  traZODone 100 milliGRAM(s) Oral at bedtime    MEDICATIONS  (PRN):  artificial  tears Solution 1 Drop(s) Both EYES every 3 hours PRN Dry Eyes  clonazePAM  Tablet 0.5 milliGRAM(s) Oral at bedtime PRN anxeity  HYDROmorphone  Injectable 0.2 milliGRAM(s) IV Push every 10 minutes PRN Moderate Pain (4 - 6)  HYDROmorphone  Injectable 0.5 milliGRAM(s) IV Push every 10 minutes PRN Severe Pain (7 - 10)  HYDROmorphone  Injectable 0.5 milliGRAM(s) IV Push every 3 hours PRN Breakthrough pain  magnesium hydroxide Suspension 30 milliLiter(s) Oral daily PRN Constipation  ondansetron Injectable 4 milliGRAM(s) IV Push once PRN Nausea and/or Vomiting  ondansetron Injectable 4 milliGRAM(s) IV Push every 6 hours PRN Nausea and/or Vomiting  oxyCODONE    IR 5 milliGRAM(s) Oral every 3 hours PRN Moderate Pain (4 - 6)  oxyCODONE    IR 10 milliGRAM(s) Oral every 3 hours PRN Severe Pain (7 - 10)      Allergies    No Known Allergies    Intolerances          LABS:      Ca    8.7        14 May 2025 06:00        Urinalysis Basic - ( 14 May 2025 06:00 )    Color: x / Appearance: x / SG: x / pH: x  Gluc: 100 mg/dL / Ketone: x  / Bili: x / Urobili: x   Blood: x / Protein: x / Nitrite: x   Leuk Esterase: x / RBC: x / WBC x   Sq Epi: x / Non Sq Epi: x / Bacteria: x      CAPILLARY BLOOD GLUCOSE          RADIOLOGY & ADDITIONAL TESTS:        Care Discussed with Consultants/Other Providers:    Advanced Directives: [ ] DNR  [ ] No feeding tube  [ ] MOLST in chart  [ ] MOLST completed today  [ ] Unknown  
Patient is a 80y old  Female who presents with a chief complaint of Right Total Knee Replacement (17 May 2025 08:09)      INTERVAL HPI/OVERNIGHT EVENTS:  Patient seen and examined in am, pain fair controlled, no dizziness, nausea, vomiting, fever, chills. no BM in past 24 hours    ROS reviewed and is otherwise negative        Vital Signs Last 24 Hrs  T(C): 36.8 (17 May 2025 07:57), Max: 36.9 (16 May 2025 15:28)  T(F): 98.3 (17 May 2025 07:57), Max: 98.4 (16 May 2025 15:28)  HR: 74 (17 May 2025 07:57) (74 - 80)  BP: 96/62 (17 May 2025 07:57) (90/57 - 108/70)  BP(mean): --  RR: 17 (17 May 2025 07:57) (16 - 17)  SpO2: 95% (17 May 2025 07:57) (95% - 97%)    Parameters below as of 17 May 2025 07:57  Patient On (Oxygen Delivery Method): room air        PHYSICAL EXAM:  GENERAL: NAD, well-groomed, elderly female  HEAD:  Atraumatic, Normocephalic  EYES: EOMI, PERRLA  ENMT: Moist mucous membranes, No lesions   NECK: Supple, No JVD   NERVOUS SYSTEM:  Alert & Oriented X3, Good concentration; All 4 extremities mobile   CHEST/LUNG: Clear to auscultation bilaterally; No rales, rhonchi, wheezing, or rubs  HEART: Regular rate and rhythm; No murmurs, rubs, or gallops  ABDOMEN: Soft, Nontender, Nondistended; Bowel sounds present  EXTREMITIES:  + Pulses, No clubbing, right knee bandage clean dry intact  SKIN: No rashes or lesions    MEDICATIONS  (STANDING):  acetaminophen     Tablet .. 1000 milliGRAM(s) Oral every 8 hours  aspirin enteric coated 81 milliGRAM(s) Oral every 12 hours  atorvastatin 20 milliGRAM(s) Oral at bedtime  celecoxib 200 milliGRAM(s) Oral every 12 hours  divalproex  milliGRAM(s) Oral daily  divalproex  milliGRAM(s) Oral at bedtime  gabapentin 300 milliGRAM(s) Oral two times a day  lactated ringers. 1000 milliLiter(s) (100 mL/Hr) IV Continuous <Continuous>  lactated ringers. 1000 milliLiter(s) (75 mL/Hr) IV Continuous <Continuous>  paliperidone ER. 6 milliGRAM(s) Oral daily  pantoprazole    Tablet 40 milliGRAM(s) Oral before breakfast  polyethylene glycol 3350 17 Gram(s) Oral at bedtime  QUEtiapine 200 milliGRAM(s) Oral at bedtime  QUEtiapine 50 milliGRAM(s) Oral at bedtime  senna 2 Tablet(s) Oral at bedtime  traZODone 100 milliGRAM(s) Oral at bedtime    MEDICATIONS  (PRN):  artificial  tears Solution 1 Drop(s) Both EYES every 3 hours PRN Dry Eyes  bisacodyl 5 milliGRAM(s) Oral every 12 hours PRN Constipation  clonazePAM  Tablet 0.5 milliGRAM(s) Oral at bedtime PRN anxeity  HYDROmorphone  Injectable 0.2 milliGRAM(s) IV Push every 10 minutes PRN Moderate Pain (4 - 6)  HYDROmorphone  Injectable 0.5 milliGRAM(s) IV Push every 10 minutes PRN Severe Pain (7 - 10)  HYDROmorphone  Injectable 0.5 milliGRAM(s) IV Push every 3 hours PRN Breakthrough pain  magnesium hydroxide Suspension 30 milliLiter(s) Oral daily PRN Constipation  ondansetron Injectable 4 milliGRAM(s) IV Push once PRN Nausea and/or Vomiting  ondansetron Injectable 4 milliGRAM(s) IV Push every 6 hours PRN Nausea and/or Vomiting  oxyCODONE    IR 5 milliGRAM(s) Oral every 3 hours PRN Moderate Pain (4 - 6)  oxyCODONE    IR 10 milliGRAM(s) Oral every 3 hours PRN Severe Pain (7 - 10)      Allergies    No Known Allergies    Intolerances          LABS:              CAPILLARY BLOOD GLUCOSE          RADIOLOGY & ADDITIONAL TESTS:        Care Discussed with Consultants/Other Providers:    Advanced Directives: [ ] DNR  [ ] No feeding tube  [ ] MOLST in chart  [ ] MOLST completed today  [ ] Unknown  
Post Op Day # 0    SUBJECTIVE    79yo Female status post right TKR .   Patient is alert and comfortable.    Pain is controlled with current pain regimen.  Denies nausea, vomiting, chest pain, shortness of breath, abdominal pain or fever.   No new complaints.    OBJECTIVE    Vital Signs Last 24 Hrs  T(C): 36.7 (13 May 2025 16:10), Max: 36.7 (13 May 2025 16:10)  T(F): 98 (13 May 2025 16:10), Max: 98 (13 May 2025 16:10)  HR: 80 (13 May 2025 16:10) (71 - 84)  BP: 153/72 (13 May 2025 16:10) (94/74 - 153/72)  BP(mean): --  RR: 15 (13 May 2025 16:10) (12 - 18)  SpO2: 100% (13 May 2025 16:10) (98% - 100%)    Parameters below as of 13 May 2025 16:10  Patient On (Oxygen Delivery Method): room air      I&O's Summary    13 May 2025 07:01  -  13 May 2025 17:01  --------------------------------------------------------  IN: 1300 mL / OUT: 150 mL / NET: 1150 mL        PHYSICAL EXAM    Right knee dressing  is clean, dry and intact.   The calf is supple/nontender.   Sensation to light touch is grossly intact distally.   Motor function distally is intact.   No foot drop.   (2+) dorsalis pedis pulse. Capillary refill is less than 2 seconds. No cyanosis.      ASSESSMENT AND PLAN  - Orthopedically stable  - DVT prophylaxis: PAS + Aspirin 81 mg Q12h  - Continue physical therapy and occupational therapy  - Weight bearing as tolerated of the right lower extremity with assistance of a walker  - Incentive spirometry encouraged  - Pain control as clinically indicated  - Disposition:  Home tomorrow pending progress with PT       
Post Op Day #2    SUBJECTIVE    79yo Female status post right TKR .   Patient is alert and comfortable.    Pain is controlled with current pain regimen.  Denies nausea, vomiting, chest pain, shortness of breath, abdominal pain or fever.   No new complaints.    OBJECTIVE    Vital Signs Last 24 Hrs  T(C): 36.9 (15 May 2025 07:56), Max: 37.1 (14 May 2025 23:30)  T(F): 98.4 (15 May 2025 07:56), Max: 98.7 (14 May 2025 23:30)  HR: 74 (15 May 2025 07:56) (72 - 85)  BP: 120/78 (15 May 2025 07:56) (103/63 - 120/78)  BP(mean): --  RR: 18 (15 May 2025 07:56) (16 - 18)  SpO2: 98% (15 May 2025 07:56) (97% - 98%)    Parameters below as of 15 May 2025 07:56  Patient On (Oxygen Delivery Method): room air      I&O's Summary    14 May 2025 07:01  -  15 May 2025 07:00  --------------------------------------------------------  IN: 0 mL / OUT: 900 mL / NET: -900 mL        PHYSICAL EXAM    Right knee Mepilex dressing is clean, dry and intact.   The calf is supple/nontender bilaterally  Sensation to light touch is grossly intact distally bilaterally   Motor function distally is intact bilaterally  No foot drop bilaterally  (2+) dorsalis pedis pulse. Capillary refill is less than 2 seconds. No cyanosis.                          10.4[L]  14.20[H] )-----------( 260      ( 14 May 2025 06:00 )             31.8[L]  14 May 2025 06:00    14 May 2025 06:00    139    |  105    |  22     ----------------------------<  100[H]  4.1     |  23     |  1.00     Ca    8.7        14 May 2025 06:00        ASSESSMENT AND PLAN    - Orthopedically stable  - DVT prophylaxis: PAS + Aspirin 81mg every 12 hours  - Continue physical therapy and occupational therapy  - Weight bearing as tolerated of the right lower extremity with assistance of a walker  - Incentive spirometry encouraged  - Pain control as clinically indicated  - Disposition:  subacute rehab     
POD #6  S/P  Right TKR                       SUBJECTIVE: Patient seen and examined. Pain is controlled. Denies any numbness or tingling to B/L LE's. Denies CP, SOB, or dyspnea. No other complaints.   Reported Pain Score = 3    OBJECTIVE:     Vital Signs Last 24 Hrs  T(C): 36.9 (18 May 2025 23:30), Max: 36.9 (18 May 2025 15:40)  T(F): 98.4 (18 May 2025 23:30), Max: 98.4 (18 May 2025 15:40)  HR: 74 (18 May 2025 23:30) (66 - 93)  BP: 99/65 (18 May 2025 23:30) (98/57 - 113/55)  BP(mean): --  RR: 16 (18 May 2025 23:30) (16 - 17)  SpO2: 95% (18 May 2025 23:30) (95% - 97%)    Parameters below as of 18 May 2025 23:30  Patient On (Oxygen Delivery Method): room air        Right Knee:          Dressing: mepilex clean/dry/intact    Bilateral LEs:         Sensation:  intact to light touch          Motor exam: 5 /5 dorsiflexion/plantarflexion/EHL/FHL          2+ DP pulses          calf supple, NT         SCDs in place    LABS:                        8.9    7.16  )-----------( 288      ( 19 May 2025 06:15 )             27.6     05-18    140  |  102  |  31[H]  ----------------------------<  97  4.3   |  29  |  0.85    Ca    8.8      18 May 2025 06:00        Urinalysis Basic - ( 18 May 2025 06:00 )    Color: x / Appearance: x / SG: x / pH: x  Gluc: 97 mg/dL / Ketone: x  / Bili: x / Urobili: x   Blood: x / Protein: x / Nitrite: x   Leuk Esterase: x / RBC: x / WBC x   Sq Epi: x / Non Sq Epi: x / Bacteria: x        MEDICATIONS:  Anticoagulation:  aspirin enteric coated 81 milliGRAM(s) Oral every 12 hours      Pain medications:   acetaminophen     Tablet .. 1000 milliGRAM(s) Oral every 8 hours  celecoxib 200 milliGRAM(s) Oral every 12 hours  clonazePAM  Tablet 0.5 milliGRAM(s) Oral at bedtime PRN  divalproex  milliGRAM(s) Oral daily  divalproex  milliGRAM(s) Oral at bedtime  gabapentin 300 milliGRAM(s) Oral two times a day  HYDROmorphone  Injectable 0.5 milliGRAM(s) IV Push every 3 hours PRN  HYDROmorphone  Injectable 0.2 milliGRAM(s) IV Push every 10 minutes PRN  HYDROmorphone  Injectable 0.5 milliGRAM(s) IV Push every 10 minutes PRN  ondansetron Injectable 4 milliGRAM(s) IV Push once PRN  ondansetron Injectable 4 milliGRAM(s) IV Push every 6 hours PRN  oxyCODONE    IR 5 milliGRAM(s) Oral every 3 hours PRN  oxyCODONE    IR 10 milliGRAM(s) Oral every 3 hours PRN  paliperidone ER. 6 milliGRAM(s) Oral daily  QUEtiapine 200 milliGRAM(s) Oral at bedtime  QUEtiapine 50 milliGRAM(s) Oral at bedtime  traZODone 100 milliGRAM(s) Oral at bedtime        A/P : Patient stable  s/p Right TKR POD # 6   -    Pain control  -    DVT ppx: Aspirin 81mg q 12 hrs  -    Weight bearing status: WBAT   -    Physical Therapy  -    Occupational Therapy  -    Medical Mgmt appreciated  -    Discharge plan: BRITTANEY varghese      
Patient is a 80y old  Female who presents with a chief complaint of Right Total Knee Replacement (19 May 2025 06:59)      INTERVAL HPI/OVERNIGHT EVENTS:  Patient seen and examined, pain well controlled at rest, does have some pain with exertion. no dizziness, nausea.     ROS reviewed and is otherwise negative        Vital Signs Last 24 Hrs  T(C): 36.6 (19 May 2025 07:45), Max: 36.9 (18 May 2025 15:40)  T(F): 97.9 (19 May 2025 07:45), Max: 98.4 (18 May 2025 15:40)  HR: 80 (19 May 2025 07:45) (74 - 93)  BP: 96/50 (19 May 2025 07:45) (96/50 - 113/55)  BP(mean): --  RR: 17 (19 May 2025 07:45) (16 - 17)  SpO2: 92% (19 May 2025 07:45) (92% - 96%)    Parameters below as of 19 May 2025 07:45  Patient On (Oxygen Delivery Method): room air        PHYSICAL EXAM:  GENERAL: NAD, well-groomed, elderly female  HEAD:  Atraumatic, Normocephalic  EYES: EOMI, PERRLA  ENMT: Moist mucous membranes, No lesions   NECK: Supple, No JVD   NERVOUS SYSTEM:  Alert & Oriented X3, Good concentration; All 4 extremities mobile   CHEST/LUNG: Clear to auscultation bilaterally; No rales, rhonchi, wheezing, or rubs  HEART: Regular rate and rhythm; No murmurs, rubs, or gallops  ABDOMEN: Soft, Nontender, mildly distended, Bowel sounds present  EXTREMITIES:  + Pulses, No clubbing, right knee bandage clean dry intact  SKIN: No rashes or lesions    MEDICATIONS  (STANDING):  acetaminophen     Tablet .. 1000 milliGRAM(s) Oral every 8 hours  aspirin enteric coated 81 milliGRAM(s) Oral every 12 hours  atorvastatin 20 milliGRAM(s) Oral at bedtime  celecoxib 200 milliGRAM(s) Oral every 12 hours  divalproex  milliGRAM(s) Oral daily  divalproex  milliGRAM(s) Oral at bedtime  gabapentin 300 milliGRAM(s) Oral two times a day  lactated ringers. 1000 milliLiter(s) (100 mL/Hr) IV Continuous <Continuous>  lactated ringers. 1000 milliLiter(s) (75 mL/Hr) IV Continuous <Continuous>  paliperidone ER. 6 milliGRAM(s) Oral daily  pantoprazole    Tablet 40 milliGRAM(s) Oral before breakfast  polyethylene glycol 3350 17 Gram(s) Oral at bedtime  QUEtiapine 200 milliGRAM(s) Oral at bedtime  QUEtiapine 50 milliGRAM(s) Oral at bedtime  senna 2 Tablet(s) Oral at bedtime  traZODone 100 milliGRAM(s) Oral at bedtime    MEDICATIONS  (PRN):  artificial  tears Solution 1 Drop(s) Both EYES every 3 hours PRN Dry Eyes  bisacodyl 5 milliGRAM(s) Oral every 12 hours PRN Constipation  clonazePAM  Tablet 0.5 milliGRAM(s) Oral at bedtime PRN anxeity  HYDROmorphone  Injectable 0.2 milliGRAM(s) IV Push every 10 minutes PRN Moderate Pain (4 - 6)  HYDROmorphone  Injectable 0.5 milliGRAM(s) IV Push every 10 minutes PRN Severe Pain (7 - 10)  HYDROmorphone  Injectable 0.5 milliGRAM(s) IV Push every 3 hours PRN Breakthrough pain  magnesium hydroxide Suspension 30 milliLiter(s) Oral daily PRN Constipation  ondansetron Injectable 4 milliGRAM(s) IV Push once PRN Nausea and/or Vomiting  ondansetron Injectable 4 milliGRAM(s) IV Push every 6 hours PRN Nausea and/or Vomiting  oxyCODONE    IR 5 milliGRAM(s) Oral every 3 hours PRN Moderate Pain (4 - 6)  oxyCODONE    IR 10 milliGRAM(s) Oral every 3 hours PRN Severe Pain (7 - 10)      Allergies    No Known Allergies    Intolerances          LABS:                        8.9    7.16  )-----------( 288      ( 19 May 2025 06:15 )             27.6     19 May 2025 06:15    139    |  104    |  24     ----------------------------<  102    4.6     |  29     |  1.00     Ca    8.7        19 May 2025 06:15        Urinalysis Basic - ( 19 May 2025 06:15 )    Color: x / Appearance: x / SG: x / pH: x  Gluc: 102 mg/dL / Ketone: x  / Bili: x / Urobili: x   Blood: x / Protein: x / Nitrite: x   Leuk Esterase: x / RBC: x / WBC x   Sq Epi: x / Non Sq Epi: x / Bacteria: x      CAPILLARY BLOOD GLUCOSE          RADIOLOGY & ADDITIONAL TESTS:        Care Discussed with Consultants/Other Providers:    Advanced Directives: [ ] DNR  [ ] No feeding tube  [ ] MOLST in chart  [ ] MOLST completed today  [ ] Unknown  
Patient is a 80y old  Female who presents with a chief complaint of Right total knee replacement (16 May 2025 06:58)      INTERVAL HPI/OVERNIGHT EVENTS:  Patient seen and examined, no events overnight, movement in right leg is improved, no dizziness, still having pain    ROS reviewed and is otherwise negative        Vital Signs Last 24 Hrs  T(C): 36.4 (16 May 2025 07:50), Max: 36.9 (15 May 2025 15:35)  T(F): 97.5 (16 May 2025 07:50), Max: 98.5 (15 May 2025 15:35)  HR: 76 (16 May 2025 07:50) (76 - 95)  BP: 98/60 (16 May 2025 07:50) (97/61 - 103/63)  BP(mean): --  RR: 17 (16 May 2025 07:50) (17 - 18)  SpO2: 96% (16 May 2025 07:50) (94% - 97%)    Parameters below as of 16 May 2025 07:50  Patient On (Oxygen Delivery Method): room air        PHYSICAL EXAM:  GENERAL: NAD, well-groomed, elderly female  HEAD:  Atraumatic, Normocephalic  EYES: EOMI, PERRLA  ENMT: Moist mucous membranes, No lesions   NECK: Supple, No JVD   NERVOUS SYSTEM:  Alert & Oriented X3, Good concentration; All 4 extremities mobile   CHEST/LUNG: Clear to auscultation bilaterally; No rales, rhonchi, wheezing, or rubs  HEART: Regular rate and rhythm; No murmurs, rubs, or gallops  ABDOMEN: Soft, Nontender, Nondistended; Bowel sounds present  EXTREMITIES:  + Pulses, No clubbing, right knee bandage clean dry intact  SKIN: No rashes or lesions      MEDICATIONS  (STANDING):  acetaminophen     Tablet .. 1000 milliGRAM(s) Oral every 8 hours  aspirin enteric coated 81 milliGRAM(s) Oral every 12 hours  atorvastatin 20 milliGRAM(s) Oral at bedtime  celecoxib 200 milliGRAM(s) Oral every 12 hours  divalproex  milliGRAM(s) Oral daily  divalproex  milliGRAM(s) Oral at bedtime  gabapentin 300 milliGRAM(s) Oral two times a day  lactated ringers. 1000 milliLiter(s) (100 mL/Hr) IV Continuous <Continuous>  lactated ringers. 1000 milliLiter(s) (75 mL/Hr) IV Continuous <Continuous>  paliperidone ER. 6 milliGRAM(s) Oral daily  pantoprazole    Tablet 40 milliGRAM(s) Oral before breakfast  polyethylene glycol 3350 17 Gram(s) Oral at bedtime  QUEtiapine 200 milliGRAM(s) Oral at bedtime  QUEtiapine 50 milliGRAM(s) Oral at bedtime  senna 2 Tablet(s) Oral at bedtime  traZODone 100 milliGRAM(s) Oral at bedtime    MEDICATIONS  (PRN):  artificial  tears Solution 1 Drop(s) Both EYES every 3 hours PRN Dry Eyes  clonazePAM  Tablet 0.5 milliGRAM(s) Oral at bedtime PRN anxeity  HYDROmorphone  Injectable 0.2 milliGRAM(s) IV Push every 10 minutes PRN Moderate Pain (4 - 6)  HYDROmorphone  Injectable 0.5 milliGRAM(s) IV Push every 10 minutes PRN Severe Pain (7 - 10)  HYDROmorphone  Injectable 0.5 milliGRAM(s) IV Push every 3 hours PRN Breakthrough pain  magnesium hydroxide Suspension 30 milliLiter(s) Oral daily PRN Constipation  ondansetron Injectable 4 milliGRAM(s) IV Push once PRN Nausea and/or Vomiting  ondansetron Injectable 4 milliGRAM(s) IV Push every 6 hours PRN Nausea and/or Vomiting  oxyCODONE    IR 5 milliGRAM(s) Oral every 3 hours PRN Moderate Pain (4 - 6)  oxyCODONE    IR 10 milliGRAM(s) Oral every 3 hours PRN Severe Pain (7 - 10)      Allergies    No Known Allergies    Intolerances          LABS:              CAPILLARY BLOOD GLUCOSE          RADIOLOGY & ADDITIONAL TESTS:        Care Discussed with Consultants/Other Providers:    Advanced Directives: [ ] DNR  [ ] No feeding tube  [ ] MOLST in chart  [ ] MOLST completed today  [ ] Unknown  
Patient is a 80y old  Female who presents with a chief complaint of right total knee replacement (18 May 2025 09:29)      INTERVAL HPI/OVERNIGHT EVENTS:  Patient seen and examined, pain fair controlled, no BM in past 24 hours, no abdominal pain    ROS reviewed and is otherwise negative        Vital Signs Last 24 Hrs  T(C): 36.7 (18 May 2025 07:52), Max: 36.9 (17 May 2025 23:00)  T(F): 98.1 (18 May 2025 07:52), Max: 98.4 (17 May 2025 23:00)  HR: 66 (18 May 2025 07:52) (65 - 88)  BP: 98/57 (18 May 2025 07:52) (97/70 - 101/70)  BP(mean): --  RR: 17 (18 May 2025 07:52) (16 - 17)  SpO2: 97% (18 May 2025 07:52) (95% - 97%)    Parameters below as of 18 May 2025 07:52  Patient On (Oxygen Delivery Method): room air        PHYSICAL EXAM:  GENERAL: NAD, well-groomed, elderly female  HEAD:  Atraumatic, Normocephalic  EYES: EOMI, PERRLA  ENMT: Moist mucous membranes, No lesions   NECK: Supple, No JVD   NERVOUS SYSTEM:  Alert & Oriented X3, Good concentration; All 4 extremities mobile   CHEST/LUNG: Clear to auscultation bilaterally; No rales, rhonchi, wheezing, or rubs  HEART: Regular rate and rhythm; No murmurs, rubs, or gallops  ABDOMEN: Soft, Nontender, mildly distended, Bowel sounds present  EXTREMITIES:  + Pulses, No clubbing, right knee bandage clean dry intact  SKIN: No rashes or lesions    MEDICATIONS  (STANDING):  acetaminophen     Tablet .. 1000 milliGRAM(s) Oral every 8 hours  aspirin enteric coated 81 milliGRAM(s) Oral every 12 hours  atorvastatin 20 milliGRAM(s) Oral at bedtime  bisacodyl Suppository 10 milliGRAM(s) Rectal once  celecoxib 200 milliGRAM(s) Oral every 12 hours  divalproex  milliGRAM(s) Oral daily  divalproex  milliGRAM(s) Oral at bedtime  gabapentin 300 milliGRAM(s) Oral two times a day  lactated ringers. 1000 milliLiter(s) (100 mL/Hr) IV Continuous <Continuous>  lactated ringers. 1000 milliLiter(s) (75 mL/Hr) IV Continuous <Continuous>  paliperidone ER. 6 milliGRAM(s) Oral daily  pantoprazole    Tablet 40 milliGRAM(s) Oral before breakfast  polyethylene glycol 3350 17 Gram(s) Oral at bedtime  QUEtiapine 200 milliGRAM(s) Oral at bedtime  QUEtiapine 50 milliGRAM(s) Oral at bedtime  senna 2 Tablet(s) Oral at bedtime  traZODone 100 milliGRAM(s) Oral at bedtime    MEDICATIONS  (PRN):  artificial  tears Solution 1 Drop(s) Both EYES every 3 hours PRN Dry Eyes  bisacodyl 5 milliGRAM(s) Oral every 12 hours PRN Constipation  clonazePAM  Tablet 0.5 milliGRAM(s) Oral at bedtime PRN anxeity  HYDROmorphone  Injectable 0.5 milliGRAM(s) IV Push every 3 hours PRN Breakthrough pain  HYDROmorphone  Injectable 0.2 milliGRAM(s) IV Push every 10 minutes PRN Moderate Pain (4 - 6)  HYDROmorphone  Injectable 0.5 milliGRAM(s) IV Push every 10 minutes PRN Severe Pain (7 - 10)  magnesium hydroxide Suspension 30 milliLiter(s) Oral daily PRN Constipation  ondansetron Injectable 4 milliGRAM(s) IV Push every 6 hours PRN Nausea and/or Vomiting  ondansetron Injectable 4 milliGRAM(s) IV Push once PRN Nausea and/or Vomiting  oxyCODONE    IR 5 milliGRAM(s) Oral every 3 hours PRN Moderate Pain (4 - 6)  oxyCODONE    IR 10 milliGRAM(s) Oral every 3 hours PRN Severe Pain (7 - 10)      Allergies    No Known Allergies    Intolerances          LABS:                        9.2    6.78  )-----------( 277      ( 18 May 2025 06:00 )             27.7     18 May 2025 06:00    140    |  102    |  31     ----------------------------<  97     4.3     |  29     |  0.85     Ca    8.8        18 May 2025 06:00        Urinalysis Basic - ( 18 May 2025 06:00 )    Color: x / Appearance: x / SG: x / pH: x  Gluc: 97 mg/dL / Ketone: x  / Bili: x / Urobili: x   Blood: x / Protein: x / Nitrite: x   Leuk Esterase: x / RBC: x / WBC x   Sq Epi: x / Non Sq Epi: x / Bacteria: x      CAPILLARY BLOOD GLUCOSE          RADIOLOGY & ADDITIONAL TESTS:        Care Discussed with Consultants/Other Providers:    Advanced Directives: [ ] DNR  [ ] No feeding tube  [ ] MOLST in chart  [ ] MOLST completed today  [ ] Unknown  
BRITTANEY FAITH  [id number]    Pt is a 80y year old Female s/p right TKR. pain is 3/10. (+) Voids, tolerating regular diet. Denies chest pain/shortness of breath/nausea/vomitting.     Vital Signs Last 24 Hrs  T(C): 36.8 (15 May 2025 23:25), Max: 36.9 (15 May 2025 07:56)  T(F): 98.3 (15 May 2025 23:25), Max: 98.5 (15 May 2025 15:35)  HR: 95 (15 May 2025 23:25) (74 - 95)  BP: 97/61 (15 May 2025 23:25) (97/61 - 120/78)  BP(mean): --  RR: 18 (15 May 2025 23:25) (18 - 18)  SpO2: 94% (15 May 2025 23:25) (94% - 98%)    Parameters below as of 15 May 2025 23:25  Patient On (Oxygen Delivery Method): room air          05-14 @ 07:01  -  05-15 @ 07:00  --------------------------------------------------------  IN: 0 mL / OUT: 900 mL / NET: -900 mL      PE:   RLE: Dressing changed, incision clean and dry, no erythema or drainage, .mepilex intact SILT distally, (+2) DP/PT pulses, EHL/FHL/TA intact, Capillary refill < 2 seconds. negative calf tenderness.PAS on    A: 80y year old Female s/p right TKR POD#2    Plan:   -DVT ppx =  aspirin enteric coated 81 milliGRAM(s) Oral every 12 hours    -PT/OT = OOB  -Pain control   -Medicine to follow   -continue to follow Labs  -continue use of PAS  -Dispo: Rehab today when bed available

## 2025-05-19 NOTE — PROGRESS NOTE ADULT - PROVIDER SPECIALTY LIST ADULT
Hospitalist
Hospitalist
Orthopedics
Pharmacy
Hospitalist
Hospitalist
Orthopedics
Hospitalist
Hospitalist
Orthopedics

## 2025-05-29 NOTE — PATIENT PROFILE ADULT - NSPROSPHOSPCHAPLAINYN_GEN_A_NUR
Hospital follow up set for Tuesday June 3rd 2025 at 2:15 PM with Doctor Bertrand at their [place] office.    no

## 2025-06-02 PROBLEM — M17.11 UNILATERAL PRIMARY OSTEOARTHRITIS, RIGHT KNEE: Chronic | Status: ACTIVE | Noted: 2025-04-24

## 2025-06-02 PROBLEM — R32 UNSPECIFIED URINARY INCONTINENCE: Chronic | Status: ACTIVE | Noted: 2025-04-24

## 2025-06-02 PROBLEM — J30.2 OTHER SEASONAL ALLERGIC RHINITIS: Chronic | Status: ACTIVE | Noted: 2025-04-24

## 2025-06-02 PROBLEM — E78.5 HYPERLIPIDEMIA, UNSPECIFIED: Chronic | Status: ACTIVE | Noted: 2025-04-24

## 2025-06-02 PROBLEM — K59.09 OTHER CONSTIPATION: Chronic | Status: ACTIVE | Noted: 2025-04-24

## 2025-06-02 PROBLEM — N32.81 OVERACTIVE BLADDER: Chronic | Status: ACTIVE | Noted: 2025-04-24

## 2025-06-02 PROBLEM — E03.9 HYPOTHYROIDISM, UNSPECIFIED: Chronic | Status: ACTIVE | Noted: 2025-04-24

## 2025-06-05 ENCOUNTER — APPOINTMENT (OUTPATIENT)
Dept: ORTHOPEDIC SURGERY | Facility: CLINIC | Age: 80
End: 2025-06-05

## 2025-06-11 PROBLEM — N32.81 OVERACTIVE BLADDER: Chronic | Status: ACTIVE | Noted: 2025-03-05

## 2025-06-11 PROBLEM — E03.9 HYPOTHYROIDISM, UNSPECIFIED: Chronic | Status: ACTIVE | Noted: 2025-03-05

## 2025-06-11 PROBLEM — E78.5 HYPERLIPIDEMIA, UNSPECIFIED: Chronic | Status: ACTIVE | Noted: 2025-03-05

## 2025-06-13 ENCOUNTER — APPOINTMENT (OUTPATIENT)
Dept: ORTHOPEDIC SURGERY | Facility: CLINIC | Age: 80
End: 2025-06-13
Payer: MEDICARE

## 2025-06-13 VITALS — WEIGHT: 149 LBS | HEIGHT: 62 IN | BODY MASS INDEX: 27.42 KG/M2

## 2025-06-13 PROBLEM — Z96.651 STATUS POST RIGHT KNEE REPLACEMENT: Status: ACTIVE | Noted: 2025-06-13

## 2025-06-13 PROCEDURE — 73560 X-RAY EXAM OF KNEE 1 OR 2: CPT | Mod: RT

## 2025-06-13 PROCEDURE — 99024 POSTOP FOLLOW-UP VISIT: CPT

## 2025-07-01 PROBLEM — Z87.19 HISTORY OF CONSTIPATION: Status: RESOLVED | Noted: 2025-07-01 | Resolved: 2025-07-01

## 2025-07-01 PROBLEM — Z86.59 HISTORY OF DEPRESSION: Status: RESOLVED | Noted: 2025-07-01 | Resolved: 2025-07-01

## 2025-07-01 PROBLEM — Z83.3 FAMILY HISTORY OF DIABETES MELLITUS: Status: ACTIVE | Noted: 2025-07-01

## 2025-07-01 PROBLEM — Z86.39 HISTORY OF HIGH CHOLESTEROL: Status: RESOLVED | Noted: 2025-07-01 | Resolved: 2025-07-01

## 2025-07-01 PROBLEM — R35.1 NOCTURIA: Status: ACTIVE | Noted: 2025-07-01

## 2025-07-01 PROBLEM — Z82.49 FAMILY HISTORY OF HYPERTENSION: Status: ACTIVE | Noted: 2025-07-01

## 2025-07-01 PROBLEM — N39.0 RECURRENT UTI: Status: ACTIVE | Noted: 2025-07-01

## 2025-07-01 RX ORDER — ATORVASTATIN CALCIUM 80 MG/1
TABLET, FILM COATED ORAL
Refills: 0 | Status: ACTIVE | COMMUNITY

## 2025-07-01 RX ORDER — DIVALPROEX SODIUM 500 MG/1
500 TABLET, DELAYED RELEASE ORAL
Refills: 0 | Status: ACTIVE | COMMUNITY

## 2025-07-01 RX ORDER — ASPIRIN 81 MG
81 TABLET, DELAYED RELEASE (ENTERIC COATED) ORAL
Refills: 0 | Status: ACTIVE | COMMUNITY

## 2025-07-01 RX ORDER — ACETAMINOPHEN 500 MG/1
500 TABLET ORAL
Refills: 0 | Status: ACTIVE | COMMUNITY

## 2025-07-01 RX ORDER — VIBEGRON 75 MG/1
75 TABLET, FILM COATED ORAL
Refills: 0 | Status: ACTIVE | COMMUNITY

## 2025-07-01 RX ORDER — DOCUSATE SODIUM 100 MG/1
100 CAPSULE, LIQUID FILLED ORAL
Refills: 0 | Status: ACTIVE | COMMUNITY

## 2025-07-01 RX ORDER — QUETIAPINE FUMARATE 50 MG/1
50 TABLET ORAL
Refills: 0 | Status: ACTIVE | COMMUNITY

## 2025-07-01 RX ORDER — CLONAZEPAM 0.5 MG/1
0.5 TABLET ORAL
Refills: 0 | Status: ACTIVE | COMMUNITY

## 2025-07-01 RX ORDER — PALIPERIDONE 6 MG/1
6 TABLET, FILM COATED, EXTENDED RELEASE ORAL
Refills: 0 | Status: ACTIVE | COMMUNITY

## 2025-07-01 RX ORDER — GABAPENTIN 300 MG
300 TABLET ORAL
Refills: 0 | Status: ACTIVE | COMMUNITY

## 2025-07-01 RX ORDER — TRAZODONE HYDROCHLORIDE 100 MG/1
100 TABLET ORAL
Refills: 0 | Status: ACTIVE | COMMUNITY

## 2025-07-03 ENCOUNTER — NON-APPOINTMENT (OUTPATIENT)
Age: 80
End: 2025-07-03

## 2025-07-15 ENCOUNTER — APPOINTMENT (OUTPATIENT)
Dept: UROGYNECOLOGY | Facility: CLINIC | Age: 80
End: 2025-07-15

## 2025-08-07 ENCOUNTER — APPOINTMENT (OUTPATIENT)
Dept: ORTHOPEDIC SURGERY | Facility: CLINIC | Age: 80
End: 2025-08-07

## 2025-08-07 NOTE — ED BEHAVIORAL HEALTH ASSESSMENT NOTE - REFERRED BY
Monitor for fevers, pain, worsening redness- go to emergency department if this occurs.  Follow up with primary care provider for recheck.    Other

## (undated) DEVICE — SUT PDS II 1 27" CT

## (undated) DEVICE — STRYKER PULSE LAVAGE WITH COAXIAL MULTI-ORIFICE TIP

## (undated) DEVICE — DRAPE 3/4 SHEET 52X76"

## (undated) DEVICE — SOL IRR POUR NS 0.9% 1000ML

## (undated) DEVICE — DRSG DERMABOND PRINEO 22CM

## (undated) DEVICE — SOL IRR POUR H2O 1000ML

## (undated) DEVICE — SUT POLYSORB 0 36" GS-11 UNDYED

## (undated) DEVICE — DRSG COMBINE 5 X 9"

## (undated) DEVICE — SUT POLYSORB 1-0 30" GS-12 UNDYED

## (undated) DEVICE — SUT MAXON 1 27" T-12

## (undated) DEVICE — PACK TOTAL KNEE

## (undated) DEVICE — SUT POLYSORB 2-0 30" GS-11 UNDYED

## (undated) DEVICE — SUT MONOCRYL 3-0 27" PS-2 UNDYED

## (undated) DEVICE — SOL IRR BAG NS 0.9% 3000ML

## (undated) DEVICE — GLV 7.5 PROTEXIS (WHITE)

## (undated) DEVICE — GLV 8 PROTEXIS (BLUE)

## (undated) DEVICE — SUT MONOSOF 3-0 18" P-12

## (undated) DEVICE — BRACE KNEE IMMOBILIZER SPLINT SUPER LARGE 20"

## (undated) DEVICE — VENODYNE/SCD SLEEVE CALF MEDIUM

## (undated) DEVICE — HOOD FLYTE STRYKER HELMET SHIELD

## (undated) DEVICE — FOLEY TRAY 16FR SURESTEP LTX BG

## (undated) DEVICE — SAW BLADE STRYKER SAGITTAL 12.5X89.5X0.89MM

## (undated) DEVICE — ELCTR AQUAMANTYS BIPOLAR SEALER 6.0

## (undated) DEVICE — ROSA NAVITRACKER KIT KNEE/SPINE

## (undated) DEVICE — HOOD T5 PEELAWAY

## (undated) DEVICE — WOUND IRR IRRISEPT W 0.5 CHG

## (undated) DEVICE — WARMING BLANKET UPPER ADULT

## (undated) DEVICE — NDL SPINAL 18G X 3.5" (PINK)

## (undated) DEVICE — SAW BLADE STRYKER SAGITTAL 3 HOLE OSCILLATING

## (undated) DEVICE — SUT MONOSOF 3-0 18" C-14

## (undated) DEVICE — ELCTR STRYKER NEPTUNE SMOKE EVACUATION PENCIL (GREEN)

## (undated) DEVICE — DRAPE INSTRUMENT POUCH 6.75" X 11"